# Patient Record
Sex: MALE | Race: WHITE | NOT HISPANIC OR LATINO | Employment: PART TIME | URBAN - METROPOLITAN AREA
[De-identification: names, ages, dates, MRNs, and addresses within clinical notes are randomized per-mention and may not be internally consistent; named-entity substitution may affect disease eponyms.]

---

## 2017-04-06 ENCOUNTER — ALLSCRIPTS OFFICE VISIT (OUTPATIENT)
Dept: OTHER | Facility: OTHER | Age: 37
End: 2017-04-06

## 2017-05-06 ENCOUNTER — HOSPITAL ENCOUNTER (EMERGENCY)
Facility: HOSPITAL | Age: 37
Discharge: HOME | End: 2017-05-06
Attending: EMERGENCY MEDICINE
Payer: COMMERCIAL

## 2017-05-06 VITALS
WEIGHT: 190 LBS | RESPIRATION RATE: 18 BRPM | OXYGEN SATURATION: 99 % | DIASTOLIC BLOOD PRESSURE: 81 MMHG | HEART RATE: 65 BPM | TEMPERATURE: 98.3 F | SYSTOLIC BLOOD PRESSURE: 130 MMHG

## 2017-05-06 DIAGNOSIS — M53.3 SACRAL BACK PAIN: Primary | ICD-10-CM

## 2017-05-06 PROCEDURE — 99283 EMERGENCY DEPT VISIT LOW MDM: CPT

## 2017-05-06 PROCEDURE — 96372 THER/PROPH/DIAG INJ SC/IM: CPT

## 2017-05-06 RX ORDER — KETOROLAC TROMETHAMINE 30 MG/ML
30 INJECTION, SOLUTION INTRAMUSCULAR; INTRAVENOUS ONCE
Status: COMPLETED | OUTPATIENT
Start: 2017-05-06 | End: 2017-05-06

## 2017-05-06 RX ORDER — CYCLOBENZAPRINE HCL 5 MG
TABLET ORAL
Qty: 12 TABLET | Refills: 0 | Status: SHIPPED | OUTPATIENT
Start: 2017-05-06 | End: 2018-02-27 | Stop reason: ALTCHOICE

## 2017-05-06 RX ORDER — IBUPROFEN 400 MG/1
400 TABLET ORAL AS NEEDED
COMMUNITY
End: 2020-01-06

## 2017-05-06 RX ORDER — LIDOCAINE 50 MG/G
1 PATCH TOPICAL ONCE
Status: DISCONTINUED | OUTPATIENT
Start: 2017-05-06 | End: 2017-05-06 | Stop reason: HOSPADM

## 2017-05-06 RX ADMIN — LIDOCAINE 1 PATCH: 50 PATCH CUTANEOUS at 20:31

## 2017-05-06 RX ADMIN — KETOROLAC TROMETHAMINE 30 MG: 30 INJECTION, SOLUTION INTRAMUSCULAR at 20:32

## 2017-05-15 ENCOUNTER — ALLSCRIPTS OFFICE VISIT (OUTPATIENT)
Dept: OTHER | Facility: OTHER | Age: 37
End: 2017-05-15

## 2017-05-17 ENCOUNTER — TRANSCRIBE ORDERS (OUTPATIENT)
Dept: ADMINISTRATIVE | Facility: HOSPITAL | Age: 37
End: 2017-05-17

## 2017-05-17 DIAGNOSIS — M54.40 ACUTE RIGHT-SIDED LOW BACK PAIN WITH SCIATICA, SCIATICA LATERALITY UNSPECIFIED: Primary | ICD-10-CM

## 2017-05-25 ENCOUNTER — GENERIC CONVERSION - ENCOUNTER (OUTPATIENT)
Dept: OTHER | Facility: OTHER | Age: 37
End: 2017-05-25

## 2017-05-25 ENCOUNTER — HOSPITAL ENCOUNTER (OUTPATIENT)
Dept: MRI IMAGING | Facility: HOSPITAL | Age: 37
Discharge: HOME | End: 2017-05-25
Attending: ANESTHESIOLOGY
Payer: COMMERCIAL

## 2017-05-25 DIAGNOSIS — M54.40 ACUTE RIGHT-SIDED LOW BACK PAIN WITH SCIATICA, SCIATICA LATERALITY UNSPECIFIED: ICD-10-CM

## 2017-05-25 PROCEDURE — 72148 MRI LUMBAR SPINE W/O DYE: CPT

## 2017-05-30 ENCOUNTER — GENERIC CONVERSION - ENCOUNTER (OUTPATIENT)
Dept: OTHER | Facility: OTHER | Age: 37
End: 2017-05-30

## 2017-08-30 ENCOUNTER — ALLSCRIPTS OFFICE VISIT (OUTPATIENT)
Dept: OTHER | Facility: OTHER | Age: 37
End: 2017-08-30

## 2017-09-12 ENCOUNTER — ALLSCRIPTS OFFICE VISIT (OUTPATIENT)
Dept: OTHER | Facility: OTHER | Age: 37
End: 2017-09-12

## 2017-11-17 ENCOUNTER — GENERIC CONVERSION - ENCOUNTER (OUTPATIENT)
Dept: OTHER | Facility: OTHER | Age: 37
End: 2017-11-17

## 2017-11-17 DIAGNOSIS — M54.50 LOW BACK PAIN: ICD-10-CM

## 2017-12-01 ENCOUNTER — APPOINTMENT (OUTPATIENT)
Dept: PHYSICAL THERAPY | Facility: REHABILITATION | Age: 37
End: 2017-12-01
Payer: COMMERCIAL

## 2017-12-01 DIAGNOSIS — M54.50 LOW BACK PAIN: ICD-10-CM

## 2017-12-01 PROCEDURE — G8991 OTHER PT/OT GOAL STATUS: HCPCS

## 2017-12-01 PROCEDURE — 97161 PT EVAL LOW COMPLEX 20 MIN: CPT

## 2017-12-01 PROCEDURE — 97112 NEUROMUSCULAR REEDUCATION: CPT

## 2017-12-01 PROCEDURE — G8990 OTHER PT/OT CURRENT STATUS: HCPCS

## 2017-12-05 ENCOUNTER — GENERIC CONVERSION - ENCOUNTER (OUTPATIENT)
Dept: PAIN MEDICINE | Facility: CLINIC | Age: 37
End: 2017-12-05

## 2017-12-06 ENCOUNTER — APPOINTMENT (OUTPATIENT)
Dept: PHYSICAL THERAPY | Facility: REHABILITATION | Age: 37
End: 2017-12-06
Payer: COMMERCIAL

## 2017-12-06 PROCEDURE — 97112 NEUROMUSCULAR REEDUCATION: CPT

## 2017-12-06 PROCEDURE — 97140 MANUAL THERAPY 1/> REGIONS: CPT

## 2017-12-14 ENCOUNTER — APPOINTMENT (OUTPATIENT)
Dept: PHYSICAL THERAPY | Facility: REHABILITATION | Age: 37
End: 2017-12-14
Payer: COMMERCIAL

## 2017-12-14 PROCEDURE — 97112 NEUROMUSCULAR REEDUCATION: CPT

## 2017-12-14 PROCEDURE — 97140 MANUAL THERAPY 1/> REGIONS: CPT

## 2017-12-21 ENCOUNTER — APPOINTMENT (OUTPATIENT)
Dept: PHYSICAL THERAPY | Facility: REHABILITATION | Age: 37
End: 2017-12-21
Payer: COMMERCIAL

## 2017-12-21 PROCEDURE — 97110 THERAPEUTIC EXERCISES: CPT

## 2017-12-21 PROCEDURE — 97112 NEUROMUSCULAR REEDUCATION: CPT

## 2017-12-21 PROCEDURE — 97140 MANUAL THERAPY 1/> REGIONS: CPT

## 2017-12-28 ENCOUNTER — APPOINTMENT (OUTPATIENT)
Dept: PHYSICAL THERAPY | Facility: REHABILITATION | Age: 37
End: 2017-12-28
Payer: COMMERCIAL

## 2018-01-04 ENCOUNTER — APPOINTMENT (OUTPATIENT)
Dept: PHYSICAL THERAPY | Facility: REHABILITATION | Age: 38
End: 2018-01-04
Payer: COMMERCIAL

## 2018-01-04 PROCEDURE — 97140 MANUAL THERAPY 1/> REGIONS: CPT

## 2018-01-10 ENCOUNTER — APPOINTMENT (OUTPATIENT)
Dept: PHYSICAL THERAPY | Facility: REHABILITATION | Age: 38
End: 2018-01-10
Payer: COMMERCIAL

## 2018-01-10 PROCEDURE — 97112 NEUROMUSCULAR REEDUCATION: CPT

## 2018-01-10 PROCEDURE — 97140 MANUAL THERAPY 1/> REGIONS: CPT

## 2018-01-10 NOTE — MISCELLANEOUS
Message   Recorded as Task   Date: 11/17/2017 09:11 AM, Created By: Julio Blount   Task Name: Miscellaneous   Assigned To: Yury Arauz   Regarding Patient: Roxana Berry, Status: Active   Comment:    Julio Blount - 17 Nov 2017 9:11 AM     TASK CREATED  phone call from patient stating he would like to try physical therapy to help with his back pain  patient requesting an order for physical therapy  please call patient at [phone]  Mercedez Shira - 17 Nov 2017 10:16 AM     TASK IN PROGRESS   Gaby Nava - 17 Nov 2017 10:22 AM     TASK EDITED  Last ov was consult with FQ 5/2017  S/w pt today who states he would like to try PT at this time  Pt has not tried PT before  C/o lower back pain that is "terrible" by the end of the week, with work and activity  Pain mostly in back but occasionally shoots down left leg to calf, heel and toe but states that is quick and does not last long  Staets taking ibuprofen for pain and resting when he can  Advised may need ov prior to ordering PT and will check with FQ  Please advise  Liss Borrego - 83 Nov 2017 11:19 AM     TASK REPLIED TO: Previously Assigned To Liss Borrego  PT ordered and printed   Landen Peña - 17 Nov 2017 11:43 AM     TASK EDITED  Left detailed vm for pt on number provided that Dr Jaylyn Márquez order PT for him and copy of the PT script will be mailed to him  Ph number for scheduling at a [facility] location will be highlighted on the script  Any questions, pt is to call the office  PT script mailed  Active Problems    1  Acute bilateral low back pain without sciatica (724 2,338 19) (M54 5)   2  Allergic rhinitis (477 9) (J30 9)   3  Anxiety (300 00) (F41 9)   4  Bronchitis (490) (J40)   5  Chronic midline low back pain without sciatica (724 2,338 29) (M54 5,G89 29)   6  Chronic prostatitis (601 1) (N41 1)   7  Depression (311) (F32 9)   8  External hemorrhoids (455 3) (K64 4)   9   Gastroesophageal reflux disease without esophagitis (530 81) (K21 9)   10  Hematuria (599 70) (R31 9)   11  Irritable bowel syndrome with diarrhea (564 1) (K58 0)   12  Pelvic pain (R10 2)   13  Pharyngitis (462) (J02 9)   14  Rectal pain (569 42) (K62 89)   15  Screening for lipoid disorders (V77 91) (Z13 220)   16  Total bilirubin, elevated (277 4) (R17)   17  WBC decreased (288 50) (D72 819)    Current Meds   1  Cetirizine HCl - 10 MG Oral Tablet; TAKE 1 TABLET DAILY; Therapy: [id number] to Recorded   2  Fluticasone Propionate 50 MCG/ACT Nasal Suspension; USE 1 TO 2 SPRAYS IN EACH   NOSTRIL ONCE DAILY; Therapy: [id number] to Recorded    Allergies    1   No Known Drug Allergies    Signatures   Electronically signed by : Jerrica Odell, ; Nov 17 2017 11:44AM EST                       (Author)

## 2018-01-11 NOTE — RESULT NOTES
Message   please let him know recent urine culture was negative, no bacteria   thanks     Verified Results  (1) URINALYSIS (will reflex a microscopy if leukocytes, occult blood, protein or nitrites are not within normal limits) 29Jun2016 09:04PM Rebecca Tran    Order Number: [id number]  TW Order Number: [id number]  TW Order Number: [id number] Order Number: [id number]     Test Name Result Flag Reference   COLOR Yellow     CLARITY Turbid     SPECIFIC GRAVITY UA 1 026  1 003-1 030   PH UA 5 5  4 5-8 0   LEUKOCYTE ESTERASE UA Negative  Negative   NITRITE UA Negative  Negative   PROTEIN UA Negative mg/dl  Negative   GLUCOSE UA Negative mg/dl  Negative   KETONES UA Negative mg/dl  Negative   UROBILINOGEN UA 0 2 E U /dl  0 2, 1 0 E U /dl   BILIRUBIN UA Negative  Negative   BLOOD UA Small A Negative   BACTERIA None Seen /hpf  None Seen, Occasional   EPITHELIAL CELLS None Seen /hpf  None Seen, Occasional   RBC UA None Seen /hpf  None Seen   WBC UA None Seen /hpf  None Seen     (1) URINE CULTURE 29Jun2016 09:04PM Rebecca Tran     Test Name Result Flag Reference   CLINICAL REPORT (Report)     Test:        Urine culture  Specimen Type:   Urine  Specimen Date:   6/29/2016 9:04 PM  Result Date:    7/1/2016 11:19 AM  Result Status:   Final result  Resulting Lab:   [address] [address]            Tel: [phone]      CULTURE                                       ------------------                                   No Growth <1000 cfu/mL

## 2018-01-12 VITALS
DIASTOLIC BLOOD PRESSURE: 70 MMHG | HEIGHT: 73 IN | TEMPERATURE: 99.1 F | RESPIRATION RATE: 16 BRPM | WEIGHT: 196.38 LBS | SYSTOLIC BLOOD PRESSURE: 120 MMHG | HEART RATE: 80 BPM | BODY MASS INDEX: 26.03 KG/M2

## 2018-01-12 NOTE — MISCELLANEOUS
Message   Recorded as Task   Date: 05/25/2017 12:15 PM, Created By: Janette Menendez   Task Name: Call Back   Assigned To: [facility] rupa clinical,Team   Regarding Patient: Candace Landaverde, Status: Active   Comment:    Janette Menendez - 25 May 2017 12:15 PM     TASK CREATED  [facility]- Patient called requesting to s/w a nurse  Stated he has some questions in regards to him getting an MRI  Please give patient a c/b [phone]   Ivory Sim - 25 May 2017 12:58 PM     TASK IN PROGRESS   Ivory Sim - 25 May 2017 1:00 PM     TASK EDITED  Spoke to pt, he wanted to confirm that the MRI of the lumbar spine will cover the sacrum area as well  Informed pt that yes the MRI will evaluate that area also  Pt appreciative and will proceed w/MRI scheduled for luis alfredo Prado - 25 May 2017 2:05 PM     TASK REPLIED TO: Previously Assigned To Isaiah bernard md aware        Active Problems    1  Acute bilateral low back pain without sciatica (724 2,338 19) (M54 5)   2  Allergic rhinitis (477 9) (J30 9)   3  Anxiety (300 00) (F41 9)   4  Change in bowel habits (787 99) (R19 4)   5  Chronic midline low back pain without sciatica (724 2,338 29) (M54 5,G89 29)   6  Chronic prostatitis (601 1) (N41 1)   7  Depression (311) (F32 9)   8  External hemorrhoids (455 3) (K64 4)   9  Gastroesophageal reflux disease without esophagitis (530 81) (K21 9)   10  Globus sensation (306 4) (F45 8)   11  Hematuria (599 70) (R31 9)   12  Irritable bowel syndrome with diarrhea (564 1) (K58 0)   13  Lesion of left external ear (380 9) (H61 92)   14  Lightheadedness (780 4) (R42)   15  Other fatigue (780 79) (R53 83)   16  Pelvic pain (R10 2)   17  Rectal pain (569 42) (K62 89)   18  Screening for lipoid disorders (V77 91) (Z13 220)   19  Total bilirubin, elevated (277 4) (R17)   20  Viral gastroenteritis (008 8) (A08 4)   21  WBC decreased (288 50) (D72 819)    Current Meds   1  Probiotic Oral Capsule;    Therapy: [id number] to Recorded    Allergies    1   No Known Drug Allergies    Signatures   Electronically signed by : Ford Marcus RN; May 25 2017  2:19PM EST                       (Author)

## 2018-01-14 VITALS
DIASTOLIC BLOOD PRESSURE: 82 MMHG | BODY MASS INDEX: 25.12 KG/M2 | SYSTOLIC BLOOD PRESSURE: 130 MMHG | TEMPERATURE: 97.5 F | WEIGHT: 189.5 LBS | HEIGHT: 73 IN | HEART RATE: 76 BPM

## 2018-01-15 VITALS
RESPIRATION RATE: 14 BRPM | SYSTOLIC BLOOD PRESSURE: 126 MMHG | BODY MASS INDEX: 24.78 KG/M2 | HEIGHT: 73 IN | DIASTOLIC BLOOD PRESSURE: 80 MMHG | HEART RATE: 88 BPM | WEIGHT: 187 LBS

## 2018-01-16 NOTE — MISCELLANEOUS
Message   Recorded as Task   Date: 05/30/2017 11:12 AM, Created By: Marjan Ashton   Task Name: Follow Up   Assigned To: Anrdea Baez   Regarding Patient: Ineljohnny Contreras, Status: Active   Comment:    Marjan Ashton - 30 May 2017 11:12 AM     TASK CREATED  left VM to go over MRI results   Ivory Sim - 30 May 2017 11:24 AM     TASK IN PROGRESS   Mary Rubi - 30 May 2017 1:55 PM     TASK EDITED  Pt returned call and can be reached from now to the rest of the day at [phone]  Bianca Funk - 30 May 2017 2:01 PM     TASK REASSIGNED: Previously Assigned To KVNG Guerrero - 30 May 2017 3:54 PM     TASK REPLIED TO: Previously Assigned To Marjan Ashton  spoke to pt and advised DDD at L5-S1  Katymau Stevens pt will f/u PRN        Active Problems    1  Acute bilateral low back pain without sciatica (724 2,338 19) (M54 5)   2  Allergic rhinitis (477 9) (J30 9)   3  Anxiety (300 00) (F41 9)   4  Change in bowel habits (787 99) (R19 4)   5  Chronic midline low back pain without sciatica (724 2,338 29) (M54 5,G89 29)   6  Chronic prostatitis (601 1) (N41 1)   7  Depression (311) (F32 9)   8  External hemorrhoids (455 3) (K64 4)   9  Gastroesophageal reflux disease without esophagitis (530 81) (K21 9)   10  Globus sensation (306 4) (F45 8)   11  Hematuria (599 70) (R31 9)   12  Irritable bowel syndrome with diarrhea (564 1) (K58 0)   13  Lesion of left external ear (380 9) (H61 92)   14  Lightheadedness (780 4) (R42)   15  Other fatigue (780 79) (R53 83)   16  Pelvic pain (R10 2)   17  Rectal pain (569 42) (K62 89)   18  Screening for lipoid disorders (V77 91) (Z13 220)   19  Total bilirubin, elevated (277 4) (R17)   20  Viral gastroenteritis (008 8) (A08 4)   21  WBC decreased (288 50) (D72 819)    Current Meds   1  Probiotic Oral Capsule; Therapy: [id number] to Recorded    Allergies    1   No Known Drug Allergies    Signatures   Electronically signed by : Sandra Sarah, ; May 30 2017  3:57PM EST                       (Author)

## 2018-01-17 NOTE — RESULT NOTES
Message   Continue present patient know that his blood work was overall stable, his total cholesterol was 197, his good chol was 44  I have encouraged increasing good fats in the diet including olive oil, avocados, nuts     Thank you     Verified Results  (1) CBC/PLT/DIFF [id number] 09:03AM Mónica Alvarez     Test Name Result Flag Reference   WBC COUNT 4 31 Thousand/uL  4 31-10 16   RBC COUNT 5 32 Million/uL  3 88-5 62   HEMOGLOBIN 15 5 g/dL  12 0-17 0   HEMATOCRIT 46 1 %  36 5-49 3   MCV 87 fL  82-98   MCH 29 1 pg  26 8-34 3   MCHC 33 6 g/dL  31 4-37 4   RDW 12 4 %  11 6-15 1   MPV 9 8 fL  8 9-12 7   PLATELET COUNT 114 Thousands/uL  149-390   nRBC AUTOMATED 0 /100 WBCs     NEUTROPHILS RELATIVE PERCENT 51 %  43-75   LYMPHOCYTES RELATIVE PERCENT 34 %  14-44   MONOCYTES RELATIVE PERCENT 10 %  4-12   EOSINOPHILS RELATIVE PERCENT 4 %  0-6   BASOPHILS RELATIVE PERCENT 1 %  0-1   NEUTROPHILS ABSOLUTE COUNT 2 28 Thousands/?L  1 85-7 62   LYMPHOCYTES ABSOLUTE COUNT 1 45 Thousands/?L  0 60-4 47   MONOCYTES ABSOLUTE COUNT 0 41 Thousand/?L  0 17-1 22   EOSINOPHILS ABSOLUTE COUNT 0 15 Thousand/?L  0 00-0 61   BASOPHILS ABSOLUTE COUNT 0 02 Thousands/?L  0 00-0 10     (1) LIPID PANEL FASTING W DIRECT LDL REFLEX [id number] 09:03AM Mónica Alvarez     Test Name Result Flag Reference   CHOLESTEROL 197 mg/dL     LDL CHOLESTEROL CALCULATED 143 mg/dL H 0-100   Triglyceride:         Normal              <150 mg/dl       Borderline High    150-199 mg/dl       High               200-499 mg/dl       Very High          >499 mg/dl  Cholesterol:         Desirable        <200 mg/dl      Borderline High  200-239 mg/dl      High             >239 mg/dl  HDL Cholesterol:        High    >59 mg/dL      Low     <41 mg/dL  LDL Cholesterol:        Optimal          <100 mg/dl        Near Optimal     100-129 mg/dl        Above Optimal          Borderline High   130-159 mg/dl          High              160-189 mg/dl          Very High        >189 mg/dl  LDL CALCULATED:    This screening LDL is a calculated result  It does not have the accuracy of the Direct Measured LDL in the monitoring of patients with hyperlipidemia and/or statin therapy  Direct Measure LDL (AER315) must be ordered separately in these patients  TRIGLYCERIDES 48 mg/dL  <=150   Specimen collection should occur prior to N-Acetylcysteine or Metamizole administration due to the potential for falsely depressed results  HDL,DIRECT 44 mg/dL  40-60   Specimen collection should occur prior to Metamizole administration due to the potential for falsely depressed results       (1) BILIRUBIN, TOTAL [id number] 09:03AM Mónica Alvarez     Test Name Result Flag Reference   BILI, TOTAL 1 35 mg/dL H 0 20-1 00

## 2018-01-17 NOTE — MISCELLANEOUS
Message  Return to work or school:   Yvan Johnson is under my professional care  He was seen in my office on 9/12/2017   He is able to return to work on  9/13/2017      Please excuse from work 9/12/2017          Signatures   Electronically signed by : FAB To; Sep 12 2017  1:32PM EST                       (Author)

## 2018-01-18 ENCOUNTER — APPOINTMENT (OUTPATIENT)
Dept: PHYSICAL THERAPY | Facility: REHABILITATION | Age: 38
End: 2018-01-18
Payer: COMMERCIAL

## 2018-01-22 VITALS
RESPIRATION RATE: 16 BRPM | BODY MASS INDEX: 26.11 KG/M2 | HEIGHT: 73 IN | DIASTOLIC BLOOD PRESSURE: 62 MMHG | WEIGHT: 197 LBS | HEART RATE: 64 BPM | TEMPERATURE: 98.9 F | SYSTOLIC BLOOD PRESSURE: 108 MMHG

## 2018-01-25 ENCOUNTER — APPOINTMENT (OUTPATIENT)
Dept: PHYSICAL THERAPY | Facility: REHABILITATION | Age: 38
End: 2018-01-25
Payer: COMMERCIAL

## 2018-01-31 ENCOUNTER — OFFICE VISIT (OUTPATIENT)
Dept: PHYSICAL THERAPY | Facility: REHABILITATION | Age: 38
End: 2018-01-31
Payer: COMMERCIAL

## 2018-01-31 DIAGNOSIS — G89.29 CHRONIC LEFT-SIDED LOW BACK PAIN WITHOUT SCIATICA: Primary | ICD-10-CM

## 2018-01-31 DIAGNOSIS — M54.50 CHRONIC LEFT-SIDED LOW BACK PAIN WITHOUT SCIATICA: Primary | ICD-10-CM

## 2018-01-31 PROCEDURE — 97140 MANUAL THERAPY 1/> REGIONS: CPT | Performed by: PHYSICAL THERAPIST

## 2018-01-31 PROCEDURE — 97112 NEUROMUSCULAR REEDUCATION: CPT | Performed by: PHYSICAL THERAPIST

## 2018-01-31 NOTE — PROGRESS NOTES
Daily Note     Today's date: 2018  Patient name: Dalia Waite  : 1980  MRN: [id number]  Referring provider: Othella Sever, MD  Dx:   Encounter Diagnosis   Name Primary?  Chronic left-sided low back pain without sciatica Yes       Start Time: 1740  Stop Time: 1830  Total time in clinic (min): 50 minutes    Subjective: Patient reports that he has been feeling well the last few days (3)  Reports complaince with hep, evident upon good recall of TE      Objective: See treatment diary below      Assessment: Tolerated treatment well  Patient with improved core control evident upon ability to perfrom seated hip flexion without LS compensatory rotation  + ely and 90/90 HS testing, added mobility to quad and HS to HEP  Good overall LS AROM, pain free with tightness felt at end range flexion  Plan: Continue per plan of care       Precautions: none    Daily Treatment Diary     Manual              Assessment time/education 20'                                                                    Exercise Diary              Seated HS stretch 20"x4            [place]: forward/backward lean 2x8 each            Supine TA + march 15 B/L                                                                                                                                                                                                                                             Modalities

## 2018-02-08 ENCOUNTER — OFFICE VISIT (OUTPATIENT)
Dept: PHYSICAL THERAPY | Facility: REHABILITATION | Age: 38
End: 2018-02-08
Payer: COMMERCIAL

## 2018-02-08 DIAGNOSIS — G89.29 CHRONIC LEFT-SIDED LOW BACK PAIN WITHOUT SCIATICA: Primary | ICD-10-CM

## 2018-02-08 DIAGNOSIS — M54.50 CHRONIC LEFT-SIDED LOW BACK PAIN WITHOUT SCIATICA: Primary | ICD-10-CM

## 2018-02-08 PROCEDURE — 97140 MANUAL THERAPY 1/> REGIONS: CPT | Performed by: PHYSICAL THERAPIST

## 2018-02-08 PROCEDURE — 97112 NEUROMUSCULAR REEDUCATION: CPT | Performed by: PHYSICAL THERAPIST

## 2018-02-08 NOTE — PROGRESS NOTES
Daily Note     Today's date: 2018  Patient name: John Ashton  : 1980  MRN: [id number]  Referring provider: Mago Vargas MD  Dx:   Encounter Diagnosis   Name Primary?  Chronic left-sided low back pain without sciatica Yes       Start Time:   Stop Time:   Total time in clinic (min): 45 minutes    Subjective: Patient reports that his pain has been last for short duration when he does experience it  Objective: See treatment diary below      Assessment: Tolerated treatment well  Patient with poor hip extension evident upon compensatory LS ext with hip extension passively  Added q-ped kick backs and martin stretch to HEP      Plan: Continue per plan of care        Precautions: none    Daily Treatment Diary     Manual             Assessment time/education 20'            SL hip flexor stretch  B/L-3x                                                       Exercise Diary             Seated HS stretch 20"x4            [place]: forward/backward lean 2x8 each            Supine TA + march 15x B/L            Bike  5'           Qped arm lifts  20x           Qped multif  20x           Qped hip ext  10x                                                                                                                                                                                         Modalities

## 2018-02-16 RX ORDER — ALBUTEROL SULFATE 90 UG/1
AEROSOL, METERED RESPIRATORY (INHALATION)
COMMUNITY
Start: 2014-08-24 | End: 2018-02-27 | Stop reason: ALTCHOICE

## 2018-02-16 RX ORDER — NAPROXEN SODIUM 220 MG
220 TABLET ORAL AS NEEDED
COMMUNITY
End: 2020-01-06

## 2018-02-16 RX ORDER — FEXOFENADINE HCL 180 MG/1
180 TABLET ORAL
COMMUNITY
End: 2018-02-27 | Stop reason: ALTCHOICE

## 2018-02-16 RX ORDER — FLUTICASONE PROPIONATE 50 MCG
1-2 SPRAY, SUSPENSION (ML) NASAL AS NEEDED
COMMUNITY
Start: 2017-09-12 | End: 2020-01-06

## 2018-02-16 RX ORDER — PREDNISONE 50 MG/1
50 TABLET ORAL
COMMUNITY
Start: 2014-08-24 | End: 2018-02-27 | Stop reason: ALTCHOICE

## 2018-02-16 RX ORDER — BACITRACIN, NEOMYCIN, POLYMYXIN B 400; 3.5; 5 [USP'U]/G; MG/G; [USP'U]/G
OINTMENT TOPICAL
COMMUNITY
End: 2018-02-27 | Stop reason: ALTCHOICE

## 2018-02-16 RX ORDER — CETIRIZINE HYDROCHLORIDE 10 MG/1
1 TABLET ORAL AS NEEDED
COMMUNITY
Start: 2017-09-12

## 2018-02-17 ENCOUNTER — OFFICE VISIT (OUTPATIENT)
Dept: FAMILY MEDICINE CLINIC | Facility: CLINIC | Age: 38
End: 2018-02-17
Payer: COMMERCIAL

## 2018-02-17 VITALS
HEIGHT: 73 IN | TEMPERATURE: 97.1 F | SYSTOLIC BLOOD PRESSURE: 122 MMHG | WEIGHT: 201.2 LBS | DIASTOLIC BLOOD PRESSURE: 88 MMHG | RESPIRATION RATE: 16 BRPM | HEART RATE: 60 BPM | BODY MASS INDEX: 26.66 KG/M2

## 2018-02-17 DIAGNOSIS — H61.22 IMPACTED CERUMEN OF LEFT EAR: Primary | ICD-10-CM

## 2018-02-17 PROCEDURE — 99212 OFFICE O/P EST SF 10 MIN: CPT | Performed by: FAMILY MEDICINE

## 2018-02-17 PROCEDURE — 69209 REMOVE IMPACTED EAR WAX UNI: CPT | Performed by: FAMILY MEDICINE

## 2018-02-17 NOTE — PROGRESS NOTES
FAMILY PRACTICE  OFFICE VISIT       NAME: Vy Gomez  AGE: 41 y o  SEX: male       : 1980        MRN: [id number]    DATE: 2018  TIME: 10:11 AM    Assessment and Plan     Problem List Items Addressed This Visit     Impacted cerumen of left ear - Primary          Patient will call if symptoms recur  Patient felt much better after having wax removal   There are no Patient Instructions on file for this visit  Chief Complaint     Chief Complaint   Patient presents with    Earache     Pt is here w/ c/o left ear pain times 2 weeks mainly when he moves his jaw        History of Present Illness     Patient states he has had several weeks history of left ear fullness and discomfort  He denies any recent illness  Earache          Review of Systems   Review of Systems   Constitutional: Negative  HENT: Positive for ear pain  As per HPI   Respiratory: Negative  Cardiovascular: Negative  Gastrointestinal: Negative  Active Problem List     Patient Active Problem List   Diagnosis    Impacted cerumen of left ear       Past Medical History:  No past medical history on file  Past Surgical History:  No past surgical history on file  Family History:  No family history on file  Social History:  Social History     Social History    Marital status: /Civil Union     Spouse name: N/A    Number of children: N/A    Years of education: N/A     Occupational History    Not on file       Social History Main Topics    Smoking status: Never Smoker    Smokeless tobacco: Never Used    Alcohol use No    Drug use: No    Sexual activity: Not on file     Other Topics Concern    Not on file     Social History Narrative    No narrative on file       Objective     Vitals:    18 0939   BP: 122/88   Pulse: 60   Resp: 16   Temp: (!) 97 1 °F (36 2 °C)     Wt Readings from Last 3 Encounters:   18 91 3 kg (201 lb 3 2 oz)   17 89 4 kg (197 lb)   17 89 1 kg (196 lb 6 1 oz)       Physical Exam   HENT:   Left ear canal was impacted with wax  Right tympanic membrane was within normal limits  Cardiovascular:   Regular rate and rhythm with no murmurs   Pulmonary/Chest:   Lungs are clear to auscultation without wheezes,rales, or rhonchi   Lymphadenopathy:     He has no cervical adenopathy        Ear cerumen removal  Date/Time: 2/17/2018 10:09 AM  Performed by: Cammie Wilkinson by: Camden Toribio     Consent:     Consent obtained:  Verbal    Consent given by:  Patient  Universal protocol:     Procedure explained and questions answered to patient or proxy's satisfaction: yes    Procedure details:     Location:  L ear    Procedure type: irrigation      Approach:  Natural orifice    Equipment used:  Curette  Post-procedure details:     Complication:  None    Hearing quality:  Normal  Comments:      Using irrigation with peroxide and water mixture and using a curette I was able to remove a large amount of cerumen from left ear canal   Tympanic membrane wasWithin normal limits        Pertinent Laboratory/Diagnostic Studies:  Lab Results   Component Value Date    GLUCOSE 87 04/25/2016    BUN 13 04/25/2016    CREATININE 0 90 04/25/2016    CALCIUM 9 1 04/25/2016     04/25/2016    K 3 9 04/25/2016    CO2 31 04/25/2016     04/25/2016     Lab Results   Component Value Date    BILITOT 1 35 (H) 11/11/2016       Lab Results   Component Value Date    WBC 4 31 11/11/2016    HGB 15 5 11/11/2016    HCT 46 1 11/11/2016    MCV 87 11/11/2016     11/11/2016       No results found for: TSH    Lab Results   Component Value Date    CHOL 197 11/11/2016     Lab Results   Component Value Date    TRIG 48 11/11/2016     Lab Results   Component Value Date    HDL 44 11/11/2016     Lab Results   Component Value Date    LDLCALC 143 (H) 11/11/2016     No results found for: HGBA1C    Results for orders placed or performed in visit on 11/11/16   CBC and differential   Result Value Ref Range    WBC 4 31 4 31 - 10 16 Thousand/uL    RBC 5 32 3 88 - 5 62 Million/uL    Hemoglobin 15 5 12 0 - 17 0 g/dL    Hematocrit 46 1 36 5 - 49 3 %    MCV 87 82 - 98 fL    MCH 29 1 26 8 - 34 3 pg    MCHC 33 6 31 4 - 37 4 g/dL    RDW 12 4 11 6 - 15 1 %    MPV 9 8 8 9 - 12 7 fL    Platelets 424 170 - 869 Thousands/uL    nRBC 0 /100 WBCs    Neutrophils Relative 51 43 - 75 %    Lymphocytes Relative 34 14 - 44 %    Monocytes Relative 10 4 - 12 %    Eosinophils Relative 4 0 - 6 %    Basophils Relative 1 0 - 1 %    Neutrophils Absolute 2 28 1 85 - 7 62 Thousands/µL    Lymphocytes Absolute 1 45 0 60 - 4 47 Thousands/µL    Monocytes Absolute 0 41 0 17 - 1 22 Thousand/µL    Eosinophils Absolute 0 15 0 00 - 0 61 Thousand/µL    Basophils Absolute 0 02 0 00 - 0 10 Thousands/µL   Bilirubin, total   Result Value Ref Range    Total Bilirubin 1 35 (H) 0 20 - 1 00 mg/dL   Lipid Panel with Direct LDL reflex   Result Value Ref Range    Cholesterol 197 50 - 200 mg/dL    Triglycerides 48 <=150 mg/dL    HDL, Direct 44 40 - 60 mg/dL    LDL Calculated 143 (H) 0 - 100 mg/dL       Orders Placed This Encounter   Procedures    Ear cerumen removal       ALLERGIES:  No Known Allergies    Current Medications     Current Outpatient Prescriptions   Medication Sig Dispense Refill    cetirizine (ZyrTEC) 10 mg tablet Take 1 tablet by mouth as needed        fluticasone (FLONASE) 50 mcg/act nasal spray 1-2 sprays into each nostril as needed        ibuprofen (MOTRIN) 400 mg tablet Take 400 mg by mouth as needed for mild pain        naproxen sodium (ALEVE) 220 MG tablet Take 220 mg by mouth as needed        albuterol (PROVENTIL HFA,VENTOLIN HFA) 90 mcg/act inhaler 2 puffs QID prn cough/ wheeze        cyclobenzaprine (FLEXERIL) 5 mg tablet 1-2 PO TID PRN 12 tablet 0    fexofenadine (ALLEGRA) 180 MG tablet Take 180 mg by mouth      neomycin-bacitracin-polymyxin b (NEOSPORIN) ointment Apply topically      predniSONE 50 mg tablet 50 mg       No current facility-administered medications for this visit  Health Maintenance     Health Maintenance   Topic Date Due    HIV SCREENING  1980    PT PLAN OF CARE  1980    DTaP,Tdap,and Td Vaccines (1 - Tdap) 02/17/1988    Depression Screening PHQ-9  02/17/1993    INFLUENZA VACCINE  09/01/2017       There is no immunization history on file for this patient      Duke Pack MD

## 2018-02-19 ENCOUNTER — OFFICE VISIT (OUTPATIENT)
Dept: PHYSICAL THERAPY | Facility: REHABILITATION | Age: 38
End: 2018-02-19
Payer: COMMERCIAL

## 2018-02-19 DIAGNOSIS — M54.50 CHRONIC LEFT-SIDED LOW BACK PAIN WITHOUT SCIATICA: Primary | ICD-10-CM

## 2018-02-19 DIAGNOSIS — G89.29 CHRONIC LEFT-SIDED LOW BACK PAIN WITHOUT SCIATICA: Primary | ICD-10-CM

## 2018-02-19 PROCEDURE — 97112 NEUROMUSCULAR REEDUCATION: CPT | Performed by: PHYSICAL THERAPIST

## 2018-02-19 PROCEDURE — 97140 MANUAL THERAPY 1/> REGIONS: CPT | Performed by: PHYSICAL THERAPIST

## 2018-02-19 NOTE — PROGRESS NOTES
Daily Note     Today's date: 2018  Patient name: Samantha Jarvis  : 1980  MRN: [id number]  Referring provider: Jamari Mccollum MD  Dx:   Encounter Diagnosis     ICD-10-CM    1  Chronic left-sided low back pain without sciatica M54 5     G89 29        Start Time: 1700  Stop Time: 1745  Total time in clinic (min): 45 minutes    Subjective: Patient continues to report no changes in LBP at this time  Change overall since IE but LBP still remains      Objective: See treatment diary below      Assessment: Tolerated treatment fair  Patient demonstrates lower LS (L3-5) hypomobility with segmental testing, hinge point above with all AROM, pull felt L1-2,L2-3 with end range flexion, unchanged with LS self SNAGS  Plan: Continue per plan of care    Patient to see PT Lillian Ng in 1 month for re-assessment    Precautions: none    Daily Treatment Diary     Manual            Assessment time/education 20'            SL hip flexor stretch  B/L-3x            Assessment and education   20'          Psoas STM   5'                           Exercise Diary            Seated HS stretch 20"x4            [place]: forward/backward lean 2x8 each            Supine TA + march 15x B/L            Bike  5'           Qped arm lifts  20x           Qped multif  20x           Qped hip ext  10x            LS segmental mobility at wall   12x          LS SNAG   2x6                                                                                                                                                             Modalities

## 2018-02-27 ENCOUNTER — OFFICE VISIT (OUTPATIENT)
Dept: FAMILY MEDICINE CLINIC | Facility: CLINIC | Age: 38
End: 2018-02-27
Payer: COMMERCIAL

## 2018-02-27 VITALS
RESPIRATION RATE: 16 BRPM | HEART RATE: 54 BPM | TEMPERATURE: 97.1 F | BODY MASS INDEX: 26.4 KG/M2 | HEIGHT: 73 IN | WEIGHT: 199.2 LBS | SYSTOLIC BLOOD PRESSURE: 112 MMHG | DIASTOLIC BLOOD PRESSURE: 78 MMHG

## 2018-02-27 DIAGNOSIS — H69.82 DYSFUNCTION OF LEFT EUSTACHIAN TUBE: Primary | ICD-10-CM

## 2018-02-27 PROBLEM — H69.92 DYSFUNCTION OF LEFT EUSTACHIAN TUBE: Status: ACTIVE | Noted: 2018-02-27

## 2018-02-27 PROCEDURE — 99213 OFFICE O/P EST LOW 20 MIN: CPT | Performed by: FAMILY MEDICINE

## 2018-02-27 RX ORDER — PREDNISONE 20 MG/1
TABLET ORAL
Qty: 7 TABLET | Refills: 0 | Status: SHIPPED | OUTPATIENT
Start: 2018-02-27 | End: 2018-05-03 | Stop reason: ALTCHOICE

## 2018-02-28 NOTE — PROGRESS NOTES
FAMILY PRACTICE OFFICE VISIT       NAME: Trina Renteria  AGE: 41 y o  SEX: male       : 1980        MRN: [id number]    DATE: 2018  TIME: 7:08 PM    Assessment and Plan     Problem List Items Addressed This Visit     Dysfunction of left eustachian tube - Primary    Relevant Medications    predniSONE 20 mg tablet        Patient appears to have eustachian tube dysfunction of his left ear  He was given a prednisone tapering dose to use as directed for 6 days  He was also given referral to [facility] if symptoms persist after medication completed    There are no Patient Instructions on file for this visit  Chief Complaint     Chief Complaint   Patient presents with    Earache     Patient is here c/o a left earache x's 1+ wks  History of Present Illness     Patient states he is still experiencing intermittent left ear pain on a daily basis  He does wear earplugs at work however this is not a change for him  He denies any ear discharge  He does use Flonase on a daily basis for allergies and sinus issues  He denies any changes in hearing      Earache    Pertinent negatives include no ear discharge or sore throat  Review of Systems   Review of Systems   Constitutional: Negative  HENT: Positive for ear pain and sinus pressure  Negative for ear discharge, sore throat and tinnitus  Eyes: Negative  Respiratory: Negative  Cardiovascular: Negative          Active Problem List     Patient Active Problem List   Diagnosis    Impacted cerumen of left ear    Acute bilateral low back pain without sciatica    Mixed emotional features as adjustment reaction    Allergic rhinitis due to pollen    Anxiety    Chronic prostatitis    Gastroesophageal reflux disease without esophagitis    Hyperlipidemia    Irritable bowel syndrome with diarrhea    Total bilirubin, elevated    Dysfunction of left eustachian tube       Past Medical History:  No past medical history on file     Past Surgical History:  No past surgical history on file  Family History:  No family history on file  Social History:  Social History     Social History    Marital status: /Civil Union     Spouse name: N/A    Number of children: N/A    Years of education: N/A     Occupational History    Not on file  Social History Main Topics    Smoking status: Never Smoker    Smokeless tobacco: Never Used    Alcohol use No    Drug use: No    Sexual activity: Not on file     Other Topics Concern    Not on file     Social History Narrative    No narrative on file       Objective     Vitals:    02/27/18 1734   BP: 112/78   Pulse: (!) 54   Resp: 16   Temp: (!) 97 1 °F (36 2 °C)     Wt Readings from Last 3 Encounters:   02/27/18 90 4 kg (199 lb 3 2 oz)   02/17/18 91 3 kg (201 lb 3 2 oz)   09/12/17 89 4 kg (197 lb)       Physical Exam   HENT:   Mouth/Throat: Oropharyngeal exudate present  Tympanic membranes within normal limits bilaterally  No evidence of any wax impaction at this time  Nasal mucosa appears normal   Lymphadenopathy:     He has cervical adenopathy         Pertinent Laboratory/Diagnostic Studies:  Lab Results   Component Value Date    GLUCOSE 87 04/25/2016    BUN 13 04/25/2016    CREATININE 0 90 04/25/2016    CALCIUM 9 1 04/25/2016     04/25/2016    K 3 9 04/25/2016    CO2 31 04/25/2016     04/25/2016     Lab Results   Component Value Date    BILITOT 1 35 (H) 11/11/2016       Lab Results   Component Value Date    WBC 4 31 11/11/2016    HGB 15 5 11/11/2016    HCT 46 1 11/11/2016    MCV 87 11/11/2016     11/11/2016       No results found for: TSH    Lab Results   Component Value Date    CHOL 197 11/11/2016     Lab Results   Component Value Date    TRIG 48 11/11/2016     Lab Results   Component Value Date    HDL 44 11/11/2016     Lab Results   Component Value Date    LDLCALC 143 (H) 11/11/2016     No results found for: HGBA1C    Results for orders placed or performed in visit on 11/11/16   CBC and differential   Result Value Ref Range    WBC 4 31 4 31 - 10 16 Thousand/uL    RBC 5 32 3 88 - 5 62 Million/uL    Hemoglobin 15 5 12 0 - 17 0 g/dL    Hematocrit 46 1 36 5 - 49 3 %    MCV 87 82 - 98 fL    MCH 29 1 26 8 - 34 3 pg    MCHC 33 6 31 4 - 37 4 g/dL    RDW 12 4 11 6 - 15 1 %    MPV 9 8 8 9 - 12 7 fL    Platelets 133 232 - 039 Thousands/uL    nRBC 0 /100 WBCs    Neutrophils Relative 51 43 - 75 %    Lymphocytes Relative 34 14 - 44 %    Monocytes Relative 10 4 - 12 %    Eosinophils Relative 4 0 - 6 %    Basophils Relative 1 0 - 1 %    Neutrophils Absolute 2 28 1 85 - 7 62 Thousands/µL    Lymphocytes Absolute 1 45 0 60 - 4 47 Thousands/µL    Monocytes Absolute 0 41 0 17 - 1 22 Thousand/µL    Eosinophils Absolute 0 15 0 00 - 0 61 Thousand/µL    Basophils Absolute 0 02 0 00 - 0 10 Thousands/µL   Bilirubin, total   Result Value Ref Range    Total Bilirubin 1 35 (H) 0 20 - 1 00 mg/dL   Lipid Panel with Direct LDL reflex   Result Value Ref Range    Cholesterol 197 50 - 200 mg/dL    Triglycerides 48 <=150 mg/dL    HDL, Direct 44 40 - 60 mg/dL    LDL Calculated 143 (H) 0 - 100 mg/dL       No orders of the defined types were placed in this encounter  ALLERGIES:  No Known Allergies    Current Medications     Current Outpatient Prescriptions   Medication Sig Dispense Refill    cetirizine (ZyrTEC) 10 mg tablet Take 1 tablet by mouth as needed        fluticasone (FLONASE) 50 mcg/act nasal spray 1-2 sprays into each nostril as needed        ibuprofen (MOTRIN) 400 mg tablet Take 400 mg by mouth as needed for mild pain        naproxen sodium (ALEVE) 220 MG tablet Take 220 mg by mouth as needed        predniSONE 20 mg tablet 2 tabs x 2 days, 1 tab x 2 days, 1/2 tab x 2 days 7 tablet 0     No current facility-administered medications for this visit            Health Maintenance     Health Maintenance   Topic Date Due    HIV SCREENING  1980    PT PLAN OF CARE  1981    DTaP,Tdap,and Td Vaccines (1 - Tdap) 02/17/1988    Depression Screening PHQ-9  02/17/1993    INFLUENZA VACCINE  09/01/2017       There is no immunization history on file for this patient      Kareen Smis MD

## 2018-03-22 ENCOUNTER — EVALUATION (OUTPATIENT)
Dept: PHYSICAL THERAPY | Facility: REHABILITATION | Age: 38
End: 2018-03-22
Payer: COMMERCIAL

## 2018-03-22 ENCOUNTER — APPOINTMENT (OUTPATIENT)
Dept: PHYSICAL THERAPY | Facility: REHABILITATION | Age: 38
End: 2018-03-22
Payer: COMMERCIAL

## 2018-03-22 DIAGNOSIS — M54.50 CHRONIC LEFT-SIDED LOW BACK PAIN WITHOUT SCIATICA: Primary | ICD-10-CM

## 2018-03-22 DIAGNOSIS — G89.29 CHRONIC LEFT-SIDED LOW BACK PAIN WITHOUT SCIATICA: Primary | ICD-10-CM

## 2018-03-22 PROCEDURE — 97110 THERAPEUTIC EXERCISES: CPT | Performed by: PHYSICAL THERAPIST

## 2018-03-22 PROCEDURE — 97140 MANUAL THERAPY 1/> REGIONS: CPT | Performed by: PHYSICAL THERAPIST

## 2018-03-22 PROCEDURE — G8992 OTHER PT/OT  D/C STATUS: HCPCS | Performed by: PHYSICAL THERAPIST

## 2018-03-22 PROCEDURE — G8991 OTHER PT/OT GOAL STATUS: HCPCS | Performed by: PHYSICAL THERAPIST

## 2018-03-22 NOTE — PROGRESS NOTES
Daily Note/Discharge    Today's date: 3/22/2018  Patient name: Meryle Shown  : 1980  MRN: [id number]  Referring provider: Michael Pereira MD  Dx:   Encounter Diagnosis     ICD-10-CM    1  Chronic left-sided low back pain without sciatica M54 5     G89 29        Start Time: 1720  Stop Time: 1810  Total time in clinic (min): 50 minutes     Pt is returning to PT for the first time since 18 per the POC  Standing classical lx arom:  Flexion = wfl with c/o "tightness" in his lumbar region, extension = wnl and pain-free  Neuro testing:  Pin prick testing:  Intact b/l  ANTT:  seated slump:  B/l = (+) for tension and limited ROM, long sit slump:  B/l = (+) for tension and limited ROM    FOTO has increased from 65 to 75 indicating improvement  Subjective:   His compliance with his current HEP has been fair  Pt has been taking CBD oil and feels that has been helping with his pain  He feels his sx's are worse with activity t/o the day  Assessment: Tolerated treatment well  Patient exhibited good technique with therapeutic exercises  Plan: d/c to ongoing hep  HEP updated with nerve glides (seated and long sit), lumbar flexion segmental neuro re-ed (seated and standing), and supine thoracic tissue deformation with tennis ball         Precautions: none    Daily Treatment Diary     Manual           Assessment time/education 20'            SL hip flexor stretch  B/L-3x            Assessment and education   20' 10'         Psoas STM   5'          Re-eval    20'             Exercise Diary           Seated HS stretch 20"x4            [place]: forward/backward lean 2x8 each            Supine TA + march 15x B/L            Bike  5'           Qped arm lifts  20x           Qped multif  20x           Qped hip ext  10x            LS segmental mobility at wall   12x          LS SNAG   2x6          Seated slump sliders    2 x 20         Long sit slump sliders 20         Lx flexion neuro re-ed    3         Thoracic self release with tennis ball    verbal                                                                                                        Modalities

## 2018-03-29 ENCOUNTER — APPOINTMENT (OUTPATIENT)
Dept: PHYSICAL THERAPY | Facility: REHABILITATION | Age: 38
End: 2018-03-29
Payer: COMMERCIAL

## 2018-05-03 ENCOUNTER — OFFICE VISIT (OUTPATIENT)
Dept: FAMILY MEDICINE CLINIC | Facility: CLINIC | Age: 38
End: 2018-05-03
Payer: COMMERCIAL

## 2018-05-03 VITALS
HEIGHT: 73 IN | BODY MASS INDEX: 26.66 KG/M2 | SYSTOLIC BLOOD PRESSURE: 118 MMHG | HEART RATE: 70 BPM | DIASTOLIC BLOOD PRESSURE: 80 MMHG | WEIGHT: 201.2 LBS | TEMPERATURE: 97.4 F | RESPIRATION RATE: 16 BRPM

## 2018-05-03 DIAGNOSIS — M54.5 LOW BACK PAIN, UNSPECIFIED BACK PAIN LATERALITY, UNSPECIFIED CHRONICITY, WITH SCIATICA PRESENCE UNSPECIFIED: Primary | ICD-10-CM

## 2018-05-03 PROCEDURE — 99214 OFFICE O/P EST MOD 30 MIN: CPT | Performed by: FAMILY MEDICINE

## 2018-05-03 RX ORDER — CYCLOBENZAPRINE HCL 5 MG
10 TABLET ORAL
Qty: 20 TABLET | Refills: 0 | Status: SHIPPED | OUTPATIENT
Start: 2018-05-03 | End: 2018-09-25

## 2018-05-03 RX ORDER — METHYLPREDNISOLONE 4 MG/1
TABLET ORAL
Qty: 21 TABLET | Refills: 0 | Status: SHIPPED | OUTPATIENT
Start: 2018-05-03 | End: 2018-09-25

## 2018-05-03 NOTE — PROGRESS NOTES
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 [employer]  which worsens the back pain  Patient states his pain improves when he is not at work  Denies red flag signs suggest tingling, numbness, weakness  Denies bowel or bladder incontinence  Patient may benefit from acupuncture  Have provided patient with referral   In addition, will send Rx for Medrol Dosepak as well as cyclobenzaprine to take only as needed  May take anti-inflammatories such as ibuprofen as needed as well  May apply heat  Continue with routine exercises  Return/ER parameters discussed  Relevant Medications    Methylprednisolone 4 MG TBPK    cyclobenzaprine (FLEXERIL) 5 mg tablet        I have spent 25 minutes with Patient  today in which greater than 50% of this time was spent in counseling/coordination of care regarding Diagnostic results, Prognosis, Risks and benefits of tx options, Intructions for management, Patient and family education, Importance of tx compliance, Risk factor reductions and Impressions  Patient Instructions   Chronic Back Pain   WHAT YOU NEED TO KNOW:   Chronic back pain is back pain that lasts 3 months or longer  This may include pain that has not been controlled or does not improve with treatment  Your back pain may cause weakness or pain that spreads to your arms or legs  DISCHARGE INSTRUCTIONS:   Return to the emergency department if:   · You have severe pain      · You have new signs of numbness or weakness, especially in your lower back, legs, arms, or genital area  · You lose control of your bladder or bowel movements  · You have a fever or sudden weight loss  Contact your healthcare provider if:   · You have new or worsened pain  · You have questions or concerns about your condition or care  Medicines:   · NSAIDs  help decrease swelling and pain  This medicine can be bought with or without a doctor's order  This medicine can cause stomach bleeding or kidney problems in certain people  If you take blood thinner medicine, always ask your healthcare provider if NSAIDs are safe for you  Always read the medicine label and follow the directions on it before using this medicine  · Acetaminophen  decreases pain  It is available without a doctor's order  Ask how much to take and how often to take it  Follow directions  Acetaminophen can cause liver damage if not taken correctly  · Prescription pain medicine  may also be given to decrease pain  Do not wait until the pain is severe before you take this medicine  · Muscle relaxers  help decrease muscle spasms and back pain  · Take your medicine as directed  Contact your healthcare provider if you think your medicine is not helping or if you have side effects  Tell him if you are allergic to any medicine  Keep a list of the medicines, vitamins, and herbs you take  Include the amounts, and when and why you take them  Bring the list or the pill bottles to follow-up visits  Carry your medicine list with you in case of an emergency  Follow up with your healthcare provider as directed: You may be referred to a sports medicine or spine specialist  Write down your questions so you remember to ask them during your visits  Manage your chronic back pain:   · Heat  helps decrease pain and muscle spasms  Apply heat on your back for 15 to 20 minutes every 2 hours or as often as directed       · Stay active  as much as you can without causing more pain  Ask your healthcare provider what exercises are right for you  Do not sit or lie down for long periods  This could make your back pain worse  Avoid heavy lifting until your pain is gone  · A physical therapist  can teach you exercises to help improve movement and strength, and to decrease pain  © 2017 [address] [employer] Information is for End User's use only and may not be sold, redistributed or otherwise used for commercial purposes  All illustrations and images included in CareNotes® are the copyrighted property of [employer]  or [address]  The above information is an  only  It is not intended as medical advice for individual conditions or treatments  Talk to your doctor, nurse or pharmacist before following any medical regimen to see if it is safe and effective for you  Chief Complaint     Chief Complaint   Patient presents with    Follow-up     Patient presents today for lower back pain  History of Present Illness     HPI  Patient presents with low back pain which she has had for the past 1 and half to 2 years described as a tightness and pulling  Patient states he completed physical therapy in March  Has been doing exercises as well  Patient states he has started taking CBD or ill which helps a little  Does take ibuprofen rarely  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  Has had an MRI and was seen by pain management, Dr Wade Rick  Review of Systems   Review of Systems   Constitutional: Negative for unexpected weight change  Gastrointestinal:          Denies change in bowel habits   Genitourinary: Negative for dysuria  Musculoskeletal: Positive for back pain  Neurological: Negative for weakness and numbness         Active Problem List     Patient Active Problem List   Diagnosis    Impacted cerumen of left ear    Low back pain    Mixed emotional features as adjustment reaction    Allergic rhinitis due to pollen    Anxiety    Chronic prostatitis    Gastroesophageal reflux disease without esophagitis    Hyperlipidemia    Irritable bowel syndrome with diarrhea    Total bilirubin, elevated    Dysfunction of left eustachian tube       Past Medical History:  No past medical history on file  Past Surgical History:  No past surgical history on file  Family History:  No family history on file  Social History:  Social History     Social History    Marital status: /Civil Union     Spouse name: N/A    Number of children: N/A    Years of education: N/A     Occupational History    Not on file  Social History Main Topics    Smoking status: Never Smoker    Smokeless tobacco: Never Used    Alcohol use No    Drug use: No    Sexual activity: Not on file     Other Topics Concern    Not on file     Social History Narrative    No narrative on file     I have reviewed the patient's medical history in detail; there are no changes to the history as noted in the electronic medical record  Objective     Vitals:    05/03/18 1108   BP: 118/80   Pulse: 70   Resp: 16   Temp: (!) 97 4 °F (36 3 °C)     Wt Readings from Last 3 Encounters:   05/03/18 91 3 kg (201 lb 3 2 oz)   02/27/18 90 4 kg (199 lb 3 2 oz)   02/17/18 91 3 kg (201 lb 3 2 oz)       Physical Exam   Constitutional: He is oriented to person, place, and time  He appears well-developed and well-nourished  HENT:   Head: Normocephalic and atraumatic  Mouth/Throat: Oropharynx is clear and moist    Eyes: Conjunctivae and EOM are normal  Pupils are equal, round, and reactive to light  Neck: Normal range of motion  Neck supple  Cardiovascular: Normal rate, regular rhythm and normal heart sounds  Pulmonary/Chest: Effort normal and breath sounds normal    Abdominal: Soft  Bowel sounds are normal  He exhibits no distension  There is no tenderness  Musculoskeletal: Normal range of motion   He exhibits no tenderness  Lymphadenopathy:     He has no cervical adenopathy  Neurological: He is alert and oriented to person, place, and time  Psychiatric: He has a normal mood and affect  Nursing note and vitals reviewed        Pertinent Laboratory/Diagnostic Studies:  Lab Results   Component Value Date    GLUCOSE 87 04/25/2016    BUN 13 04/25/2016    CREATININE 0 90 04/25/2016    CALCIUM 9 1 04/25/2016     04/25/2016    K 3 9 04/25/2016    CO2 31 04/25/2016     04/25/2016     Lab Results   Component Value Date    BILITOT 1 35 (H) 11/11/2016       Lab Results   Component Value Date    WBC 4 31 11/11/2016    HGB 15 5 11/11/2016    HCT 46 1 11/11/2016    MCV 87 11/11/2016     11/11/2016       No results found for: TSH    Lab Results   Component Value Date    CHOL 197 11/11/2016     Lab Results   Component Value Date    TRIG 48 11/11/2016     Lab Results   Component Value Date    HDL 44 11/11/2016     Lab Results   Component Value Date    LDLCALC 143 (H) 11/11/2016     No results found for: HGBA1C    Results for orders placed or performed in visit on 11/11/16   CBC and differential   Result Value Ref Range    WBC 4 31 4 31 - 10 16 Thousand/uL    RBC 5 32 3 88 - 5 62 Million/uL    Hemoglobin 15 5 12 0 - 17 0 g/dL    Hematocrit 46 1 36 5 - 49 3 %    MCV 87 82 - 98 fL    MCH 29 1 26 8 - 34 3 pg    MCHC 33 6 31 4 - 37 4 g/dL    RDW 12 4 11 6 - 15 1 %    MPV 9 8 8 9 - 12 7 fL    Platelets 709 727 - 991 Thousands/uL    nRBC 0 /100 WBCs    Neutrophils Relative 51 43 - 75 %    Lymphocytes Relative 34 14 - 44 %    Monocytes Relative 10 4 - 12 %    Eosinophils Relative 4 0 - 6 %    Basophils Relative 1 0 - 1 %    Neutrophils Absolute 2 28 1 85 - 7 62 Thousands/µL    Lymphocytes Absolute 1 45 0 60 - 4 47 Thousands/µL    Monocytes Absolute 0 41 0 17 - 1 22 Thousand/µL    Eosinophils Absolute 0 15 0 00 - 0 61 Thousand/µL    Basophils Absolute 0 02 0 00 - 0 10 Thousands/µL   Bilirubin, total   Result Value Ref Range Total Bilirubin 1 35 (H) 0 20 - 1 00 mg/dL   Lipid Panel with Direct LDL reflex   Result Value Ref Range    Cholesterol 197 50 - 200 mg/dL    Triglycerides 48 <=150 mg/dL    HDL, Direct 44 40 - 60 mg/dL    LDL Calculated 143 (H) 0 - 100 mg/dL       No orders of the defined types were placed in this encounter  ALLERGIES:  No Known Allergies    Current Medications     Current Outpatient Prescriptions   Medication Sig Dispense Refill    cetirizine (ZyrTEC) 10 mg tablet Take 1 tablet by mouth as needed        fluticasone (FLONASE) 50 mcg/act nasal spray 1-2 sprays into each nostril as needed        ibuprofen (MOTRIN) 400 mg tablet Take 400 mg by mouth as needed for mild pain        naproxen sodium (ALEVE) 220 MG tablet Take 220 mg by mouth as needed        patient supplied medication       cyclobenzaprine (FLEXERIL) 5 mg tablet Take 2 tablets (10 mg total) by mouth daily at bedtime As needed 20 tablet 0    Methylprednisolone 4 MG TBPK Use as directed on package 21 tablet 0     No current facility-administered medications for this visit  Health Maintenance     Health Maintenance   Topic Date Due    HIV SCREENING  1980    DTaP,Tdap,and Td Vaccines (1 - Tdap) 02/17/2002    INFLUENZA VACCINE  09/01/2018    Depression Screening PHQ-9  02/27/2019       There is no immunization history on file for this patient      Titi Johnson MD

## 2018-05-03 NOTE — PATIENT INSTRUCTIONS
Chronic Back Pain   WHAT YOU NEED TO KNOW:   Chronic back pain is back pain that lasts 3 months or longer  This may include pain that has not been controlled or does not improve with treatment  Your back pain may cause weakness or pain that spreads to your arms or legs  DISCHARGE INSTRUCTIONS:   Return to the emergency department if:   · You have severe pain  · You have new signs of numbness or weakness, especially in your lower back, legs, arms, or genital area  · You lose control of your bladder or bowel movements  · You have a fever or sudden weight loss  Contact your healthcare provider if:   · You have new or worsened pain  · You have questions or concerns about your condition or care  Medicines:   · NSAIDs  help decrease swelling and pain  This medicine can be bought with or without a doctor's order  This medicine can cause stomach bleeding or kidney problems in certain people  If you take blood thinner medicine, always ask your healthcare provider if NSAIDs are safe for you  Always read the medicine label and follow the directions on it before using this medicine  · Acetaminophen  decreases pain  It is available without a doctor's order  Ask how much to take and how often to take it  Follow directions  Acetaminophen can cause liver damage if not taken correctly  · Prescription pain medicine  may also be given to decrease pain  Do not wait until the pain is severe before you take this medicine  · Muscle relaxers  help decrease muscle spasms and back pain  · Take your medicine as directed  Contact your healthcare provider if you think your medicine is not helping or if you have side effects  Tell him if you are allergic to any medicine  Keep a list of the medicines, vitamins, and herbs you take  Include the amounts, and when and why you take them  Bring the list or the pill bottles to follow-up visits  Carry your medicine list with you in case of an emergency    Follow up with your healthcare provider as directed: You may be referred to a sports medicine or spine specialist  Write down your questions so you remember to ask them during your visits  Manage your chronic back pain:   · Heat  helps decrease pain and muscle spasms  Apply heat on your back for 15 to 20 minutes every 2 hours or as often as directed  · Stay active  as much as you can without causing more pain  Ask your healthcare provider what exercises are right for you  Do not sit or lie down for long periods  This could make your back pain worse  Avoid heavy lifting until your pain is gone  · A physical therapist  can teach you exercises to help improve movement and strength, and to decrease pain  © 2017 [address] [employer] Information is for End User's use only and may not be sold, redistributed or otherwise used for commercial purposes  All illustrations and images included in CareNotes® are the copyrighted property of [employer]  or [employer]  The above information is an  only  It is not intended as medical advice for individual conditions or treatments  Talk to your doctor, nurse or pharmacist before following any medical regimen to see if it is safe and effective for you

## 2018-05-05 NOTE — ASSESSMENT & PLAN NOTE
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

## 2018-09-25 ENCOUNTER — OFFICE VISIT (OUTPATIENT)
Dept: FAMILY MEDICINE CLINIC | Facility: CLINIC | Age: 38
End: 2018-09-25
Payer: COMMERCIAL

## 2018-09-25 VITALS
SYSTOLIC BLOOD PRESSURE: 120 MMHG | RESPIRATION RATE: 16 BRPM | DIASTOLIC BLOOD PRESSURE: 78 MMHG | TEMPERATURE: 97.2 F | HEART RATE: 60 BPM | WEIGHT: 205.8 LBS | BODY MASS INDEX: 27.28 KG/M2 | HEIGHT: 73 IN

## 2018-09-25 DIAGNOSIS — G89.29 CHRONIC MIDLINE LOW BACK PAIN WITHOUT SCIATICA: ICD-10-CM

## 2018-09-25 DIAGNOSIS — M54.50 CHRONIC MIDLINE LOW BACK PAIN WITHOUT SCIATICA: ICD-10-CM

## 2018-09-25 DIAGNOSIS — Z00.00 PHYSICAL EXAM: Primary | ICD-10-CM

## 2018-09-25 LAB
SL AMB  POCT GLUCOSE, UA: NORMAL
SL AMB LEUKOCYTE ESTERASE,UA: NORMAL
SL AMB POCT BILIRUBIN,UA: NORMAL
SL AMB POCT BLOOD,UA: NORMAL
SL AMB POCT CLARITY,UA: CLEAR
SL AMB POCT COLOR,UA: YELLOW
SL AMB POCT KETONES,UA: NORMAL
SL AMB POCT NITRITE,UA: NORMAL
SL AMB POCT PH,UA: 5
SL AMB POCT SPECIFIC GRAVITY,UA: 1.02
SL AMB POCT URINE PROTEIN: NORMAL
SL AMB POCT UROBILINOGEN: 0.2

## 2018-09-25 PROCEDURE — 99395 PREV VISIT EST AGE 18-39: CPT | Performed by: FAMILY MEDICINE

## 2018-09-25 PROCEDURE — 81003 URINALYSIS AUTO W/O SCOPE: CPT | Performed by: FAMILY MEDICINE

## 2018-09-26 PROBLEM — Z00.00 PHYSICAL EXAM: Status: ACTIVE | Noted: 2018-09-26

## 2018-09-26 NOTE — PROGRESS NOTES
FAMILY PRACTICE OFFICE VISIT       NAME: Pretty Lind  AGE: 41 y o  SEX: male       : 1980        MRN: [id number]    DATE: 2018  TIME: 6:58 AM    Assessment and Plan     Problem List Items Addressed This Visit     Low back pain    Relevant Orders    HLA-B27 antigen    EILEEN Screen w/ Reflex to Titer/Pattern    Sedimentation rate, automated    Lyme Antibody Profile with reflex to WB    RF Screen w/ Reflex to Titer    Physical exam - Primary    Relevant Orders    POCT urine dip auto non-scope (Completed)    CBC    Comprehensive metabolic panel    TSH, 3rd generation        Patient appears to be in stable health  He will obtain blood work as ordered for further evaluation  If all blood tests are within normal limits we may consider rheumatology evaluation for his chronic symptoms  Patient refused annual flu vaccine    There are no Patient Instructions on file for this visit  Chief Complaint     Chief Complaint   Patient presents with    Well Check     Pt is for full blood work panel       History of Present Illness     Patient denies any recent illness  He continues to describe chronic low back ache  Mornings are worse than during the day  He is been to pain management as well as physical therapy without relief in symptoms  I reviewed his MRI results which showed minimal disc protrusion at L5-S1  He does not describe any radicular type of pain  He is a nonsmoker  He does not obtain a regular form of exercise outside of work  Review of Systems   Review of Systems   Constitutional: Negative  HENT: Negative  Eyes: Negative  Respiratory: Negative  Cardiovascular: Negative  Gastrointestinal: Negative  Genitourinary: Negative  Musculoskeletal:        As per HPI   Neurological: Negative  Psychiatric/Behavioral: Negative          Active Problem List     Patient Active Problem List   Diagnosis    Impacted cerumen of left ear    Low back pain    Mixed emotional features as adjustment reaction    Allergic rhinitis due to pollen    Anxiety    Chronic prostatitis    Gastroesophageal reflux disease without esophagitis    Hyperlipidemia    Irritable bowel syndrome with diarrhea    Total bilirubin, elevated    Dysfunction of left eustachian tube    Physical exam       Past Medical History:  No past medical history on file  Past Surgical History:  No past surgical history on file  Family History:  Family History   Problem Relation Age of Onset    Melanoma Mother        Social History:  Social History     Social History    Marital status: /Civil Union     Spouse name: N/A    Number of children: N/A    Years of education: N/A     Occupational History    Not on file  Social History Main Topics    Smoking status: Never Smoker    Smokeless tobacco: Never Used    Alcohol use No      Comment: Occasional use per [employer]     Drug use: No    Sexual activity: Not on file     Other Topics Concern    Not on file     Social History Narrative    No narrative on file       Objective     Vitals:    09/25/18 1832   BP: 120/78   Pulse: 60   Resp: 16   Temp: (!) 97 2 °F (36 2 °C)     Wt Readings from Last 3 Encounters:   09/25/18 93 4 kg (205 lb 12 8 oz)   05/03/18 91 3 kg (201 lb 3 2 oz)   02/27/18 90 4 kg (199 lb 3 2 oz)       Physical Exam   Constitutional: No distress  HENT:   Mouth/Throat: Oropharynx is clear and moist  No oropharyngeal exudate  Tympanic membranes within normal limits bilaterally   Neck:   No carotid bruit   Cardiovascular:   Regular rate and rhythm with no murmurs   Pulmonary/Chest:   Lungs are clear to auscultation without wheezes,rales, or rhonchi   Abdominal:   Abdomen is soft, nontender with positive bowel sounds  There is no rebound or guarding  No masses palpated   Musculoskeletal: He exhibits no edema  Patient without vertebral tenderness of lumbar spine  Muscle strength 5/5 lower extremities    Normal range of motion of spine but patient complains of increased discomfort with extreme flexion of lumbar spine   Lymphadenopathy:     He has no cervical adenopathy  Psychiatric: He has a normal mood and affect   His behavior is normal  Judgment and thought content normal        Pertinent Laboratory/Diagnostic Studies:  Lab Results   Component Value Date    BUN 13 04/25/2016    CREATININE 0 90 04/25/2016    CALCIUM 9 1 04/25/2016     04/25/2016    K 3 9 04/25/2016    CO2 31 04/25/2016     04/25/2016     No results found for: ALT, AST, GGT, ALKPHOS, BILITOT    Lab Results   Component Value Date    WBC 4 31 11/11/2016    HGB 15 5 11/11/2016    HCT 46 1 11/11/2016    MCV 87 11/11/2016     11/11/2016       No results found for: TSH    No results found for: CHOL  Lab Results   Component Value Date    TRIG 48 11/11/2016     Lab Results   Component Value Date    HDL 44 11/11/2016     Lab Results   Component Value Date    LDLCALC 143 (H) 11/11/2016     No results found for: HGBA1C    Results for orders placed or performed in visit on 09/25/18   POCT urine dip auto non-scope   Result Value Ref Range     COLOR,UA yellow      CLARITY,UA clear     SPECIFIC GRAVITY,UA 1 020      PH,UA 5 0     LEUKOCYTE ESTERASE,UA -     NITRITE,UA -     GLUCOSE, UA -     KETONES,UA -      BILIRUBIN,UA -      BLOOD,UA -     SL AMB POCT URINE PROTEIN -     SL AMB POCT UROBILINOGEN 0 2        Orders Placed This Encounter   Procedures    HLA-B27 antigen    EILEEN Screen w/ Reflex to Titer/Pattern    Sedimentation rate, automated    Lyme Antibody Profile with reflex to WB    RF Screen w/ Reflex to Titer    CBC    Comprehensive metabolic panel    TSH, 3rd generation    POCT urine dip auto non-scope       ALLERGIES:  No Known Allergies    Current Medications     Current Outpatient Prescriptions   Medication Sig Dispense Refill    cetirizine (ZyrTEC) 10 mg tablet Take 1 tablet by mouth as needed        fluticasone (FLONASE) 50 mcg/act nasal spray 1-2 sprays into each nostril as needed        ibuprofen (MOTRIN) 400 mg tablet Take 400 mg by mouth as needed for mild pain        naproxen sodium (ALEVE) 220 MG tablet Take 220 mg by mouth as needed         No current facility-administered medications for this visit  Health Maintenance     Health Maintenance   Topic Date Due    DTaP,Tdap,and Td Vaccines (1 - Tdap) 02/17/2002    INFLUENZA VACCINE  09/01/2018    Depression Screening PHQ  02/27/2019       There is no immunization history on file for this patient      Maddison Ellsworth MD

## 2018-09-29 ENCOUNTER — APPOINTMENT (OUTPATIENT)
Dept: LAB | Facility: CLINIC | Age: 38
End: 2018-09-29
Payer: COMMERCIAL

## 2018-09-29 DIAGNOSIS — G89.29 CHRONIC MIDLINE LOW BACK PAIN WITHOUT SCIATICA: ICD-10-CM

## 2018-09-29 DIAGNOSIS — M54.50 CHRONIC MIDLINE LOW BACK PAIN WITHOUT SCIATICA: ICD-10-CM

## 2018-09-29 DIAGNOSIS — Z00.00 PHYSICAL EXAM: ICD-10-CM

## 2018-09-29 LAB
ALBUMIN SERPL BCP-MCNC: 4.1 G/DL (ref 3.5–5)
ALP SERPL-CCNC: 58 U/L (ref 46–116)
ALT SERPL W P-5'-P-CCNC: 29 U/L (ref 12–78)
ANION GAP SERPL CALCULATED.3IONS-SCNC: 2 MMOL/L (ref 4–13)
AST SERPL W P-5'-P-CCNC: 17 U/L (ref 5–45)
BILIRUB SERPL-MCNC: 1.2 MG/DL (ref 0.2–1)
BUN SERPL-MCNC: 18 MG/DL (ref 5–25)
CALCIUM SERPL-MCNC: 9.1 MG/DL (ref 8.3–10.1)
CHLORIDE SERPL-SCNC: 102 MMOL/L (ref 100–108)
CO2 SERPL-SCNC: 32 MMOL/L (ref 21–32)
CREAT SERPL-MCNC: 1 MG/DL (ref 0.6–1.3)
ERYTHROCYTE [DISTWIDTH] IN BLOOD BY AUTOMATED COUNT: 12.3 % (ref 11.6–15.1)
ERYTHROCYTE [SEDIMENTATION RATE] IN BLOOD: 1 MM/HOUR (ref 0–10)
GFR SERPL CREATININE-BSD FRML MDRD: 96 ML/MIN/1.73SQ M
GLUCOSE P FAST SERPL-MCNC: 83 MG/DL (ref 65–99)
HCT VFR BLD AUTO: 48.1 % (ref 36.5–49.3)
HGB BLD-MCNC: 15.7 G/DL (ref 12–17)
MCH RBC QN AUTO: 28.6 PG (ref 26.8–34.3)
MCHC RBC AUTO-ENTMCNC: 32.6 G/DL (ref 31.4–37.4)
MCV RBC AUTO: 88 FL (ref 82–98)
PLATELET # BLD AUTO: 235 THOUSANDS/UL (ref 149–390)
PMV BLD AUTO: 9.7 FL (ref 8.9–12.7)
POTASSIUM SERPL-SCNC: 3.9 MMOL/L (ref 3.5–5.3)
PROT SERPL-MCNC: 8.1 G/DL (ref 6.4–8.2)
RBC # BLD AUTO: 5.49 MILLION/UL (ref 3.88–5.62)
SODIUM SERPL-SCNC: 136 MMOL/L (ref 136–145)
TSH SERPL DL<=0.05 MIU/L-ACNC: 1.23 UIU/ML (ref 0.36–3.74)
WBC # BLD AUTO: 4.98 THOUSAND/UL (ref 4.31–10.16)

## 2018-09-29 PROCEDURE — 81374 HLA I TYPING 1 ANTIGEN LR: CPT

## 2018-09-29 PROCEDURE — 86038 ANTINUCLEAR ANTIBODIES: CPT

## 2018-09-29 PROCEDURE — 86430 RHEUMATOID FACTOR TEST QUAL: CPT

## 2018-09-29 PROCEDURE — 36415 COLL VENOUS BLD VENIPUNCTURE: CPT

## 2018-09-29 PROCEDURE — 80053 COMPREHEN METABOLIC PANEL: CPT

## 2018-09-29 PROCEDURE — 84443 ASSAY THYROID STIM HORMONE: CPT

## 2018-09-29 PROCEDURE — 85027 COMPLETE CBC AUTOMATED: CPT

## 2018-09-29 PROCEDURE — 86618 LYME DISEASE ANTIBODY: CPT

## 2018-09-29 PROCEDURE — 85652 RBC SED RATE AUTOMATED: CPT

## 2018-09-30 LAB
B BURGDOR IGG SER IA-ACNC: 0.1
B BURGDOR IGM SER IA-ACNC: 0.13

## 2018-10-01 LAB
RHEUMATOID FACT SER QL LA: NEGATIVE
RYE IGE QN: NEGATIVE

## 2018-10-09 ENCOUNTER — TELEPHONE (OUTPATIENT)
Dept: FAMILY MEDICINE CLINIC | Facility: CLINIC | Age: 38
End: 2018-10-09

## 2018-10-09 DIAGNOSIS — E78.5 HYPERLIPIDEMIA, UNSPECIFIED HYPERLIPIDEMIA TYPE: Primary | ICD-10-CM

## 2018-10-09 LAB — HLA-B27 QL NAA+PROBE: NEGATIVE

## 2018-10-10 ENCOUNTER — TELEPHONE (OUTPATIENT)
Dept: FAMILY MEDICINE CLINIC | Facility: CLINIC | Age: 38
End: 2018-10-10

## 2018-10-10 NOTE — TELEPHONE ENCOUNTER
----- Message from Ana Andrade MD sent at 10/1/5437  7:52 PM EDT -----  All recent blood tests regarding his back pain were read as negative

## 2018-10-10 NOTE — TELEPHONE ENCOUNTER
----- Message from Carola Hoskins MD sent at 57/8/3127  7:52 PM EDT -----  All recent blood tests regarding his back pain were read as negative

## 2018-10-10 NOTE — TELEPHONE ENCOUNTER
Recent blood work was testing for any possible evidence of Lyme disease, rheumatoid arthritis, lupus, ankylosing spondylitis which are all things that could possibly cause patient's symptoms of back pain  However all testing was normal   He does have some disc protrusion at the L5-S1 joint as seen on MRI    If physical therapy was not helpful I recommend seeing Dr Brian Reyes once again at [facility] pain management to discuss any other possible treatment options

## 2018-10-10 NOTE — TELEPHONE ENCOUNTER
----- Message from Irvin German MD sent at 15/2/1853  7:52 PM EDT -----  All recent blood tests regarding his back pain were read as negative

## 2018-12-07 ENCOUNTER — OFFICE VISIT (OUTPATIENT)
Dept: FAMILY MEDICINE CLINIC | Facility: CLINIC | Age: 38
End: 2018-12-07
Payer: COMMERCIAL

## 2018-12-07 VITALS
HEIGHT: 73 IN | DIASTOLIC BLOOD PRESSURE: 70 MMHG | SYSTOLIC BLOOD PRESSURE: 102 MMHG | HEART RATE: 60 BPM | RESPIRATION RATE: 16 BRPM | WEIGHT: 204.6 LBS | TEMPERATURE: 97.5 F | BODY MASS INDEX: 27.11 KG/M2

## 2018-12-07 DIAGNOSIS — H10.502 BLEPHAROCONJUNCTIVITIS OF LEFT EYE, UNSPECIFIED BLEPHAROCONJUNCTIVITIS TYPE: ICD-10-CM

## 2018-12-07 DIAGNOSIS — Z28.21 REFUSED INFLUENZA VACCINE: Primary | ICD-10-CM

## 2018-12-07 PROCEDURE — 3008F BODY MASS INDEX DOCD: CPT | Performed by: FAMILY MEDICINE

## 2018-12-07 PROCEDURE — 99212 OFFICE O/P EST SF 10 MIN: CPT | Performed by: FAMILY MEDICINE

## 2018-12-07 RX ORDER — TOBRAMYCIN 3 MG/ML
SOLUTION/ DROPS OPHTHALMIC
Qty: 1 BOTTLE | Refills: 0 | Status: SHIPPED | OUTPATIENT
Start: 2018-12-07 | End: 2019-03-05

## 2018-12-07 NOTE — PROGRESS NOTES
FAMILY PRACTICE OFFICE VISIT       NAME: Juliane Vargas  AGE: 41 y o  SEX: male       : 1980        MRN: [id number]    DATE: 2018  TIME: 6:11 PM    Assessment and Plan     Problem List Items Addressed This Visit     Refused influenza vaccine - Primary    Blepharoconjunctivitis of left eye    Relevant Medications    tobramycin (TOBREX) 0 3 % SOLN          Patient was given prescription for Tobrex ophthalmic drops to use 2 drops q i d  To left eye for 5-7 days  Patient will call if symptoms persist after medication completed  There are no Patient Instructions on file for this visit  Chief Complaint     Chief Complaint   Patient presents with    Conjunctivitis     Pt is here for possible left pink eye 2 + days       History of Present Illness     Patient states his daughter will was diagnosed and treated for conjunctivitis recently  Yesterday patient began experiencing irritation of left eye when he experience gritty feeling  He wears glasses but no contact lenses  He denies any other recent illness        Review of Systems   Review of Systems   Constitutional: Negative  HENT: Negative  Eyes:        As per HPI       Active Problem List     Patient Active Problem List   Diagnosis    Impacted cerumen of left ear    Low back pain    Mixed emotional features as adjustment reaction    Allergic rhinitis due to pollen    Anxiety    Chronic prostatitis    Gastroesophageal reflux disease without esophagitis    Hyperlipidemia    Irritable bowel syndrome with diarrhea    Total bilirubin, elevated    Dysfunction of left eustachian tube    Physical exam    Refused influenza vaccine    Blepharoconjunctivitis of left eye       Past Medical History:  No past medical history on file  Past Surgical History:  No past surgical history on file      Family History:  Family History   Problem Relation Age of Onset   Joycelyndeedee Abhijit Melanoma Mother        Social History:  Social History     Social History  Marital status: /Civil Union     Spouse name: N/A    Number of children: N/A    Years of education: N/A     Occupational History    Not on file  Social History Main Topics    Smoking status: Never Smoker    Smokeless tobacco: Never Used    Alcohol use No      Comment: Occasional use per [employer]     Drug use: No    Sexual activity: Not on file     Other Topics Concern    Not on file     Social History Narrative    No narrative on file       Objective     Vitals:    12/07/18 1602   BP: 102/70   Pulse: 60   Resp: 16   Temp: 97 5 °F (36 4 °C)     Wt Readings from Last 3 Encounters:   12/07/18 92 8 kg (204 lb 9 6 oz)   09/25/18 93 4 kg (205 lb 12 8 oz)   05/03/18 91 3 kg (201 lb 3 2 oz)       Physical Exam   Constitutional: No distress  HENT:   Mouth/Throat: Oropharynx is clear and moist  No oropharyngeal exudate  Tympanic membranes within normal limits bilaterally   Eyes:   Patient's sclera of left eye appears mildly injected  No discharge noted  Extraocular movements intact  Pupils equal round reactive to light  Right eye sclera was clear   Lymphadenopathy:     He has no cervical adenopathy         Pertinent Laboratory/Diagnostic Studies:  Lab Results   Component Value Date    BUN 18 09/29/2018    CREATININE 1 00 09/29/2018    CALCIUM 9 1 09/29/2018    K 3 9 09/29/2018    CO2 32 09/29/2018     09/29/2018     Lab Results   Component Value Date    ALT 29 09/29/2018    AST 17 09/29/2018    ALKPHOS 58 09/29/2018       Lab Results   Component Value Date    WBC 4 98 09/29/2018    HGB 15 7 09/29/2018    HCT 48 1 09/29/2018    MCV 88 09/29/2018     09/29/2018       No results found for: TSH    No results found for: CHOL  Lab Results   Component Value Date    TRIG 48 11/11/2016     Lab Results   Component Value Date    HDL 44 11/11/2016     Lab Results   Component Value Date    LDLCALC 143 (H) 11/11/2016     No results found for: HGBA1C    Results for orders placed or performed in visit on 09/29/18   HLA-B27 antigen   Result Value Ref Range    HLA B27 Negative    EILEEN Screen w/ Reflex to Titer/Pattern   Result Value Ref Range    EILEEN Negative Negative   Sedimentation rate, automated   Result Value Ref Range    Sed Rate 1 0 - 10 mm/hour   Lyme Antibody Profile with reflex to WB   Result Value Ref Range    LYME AB IGG 0 10 0 00 - 0 79    LYME AB IGM 0 13 0 00 - 0 79   RF Screen w/ Reflex to Titer   Result Value Ref Range    Rheumatoid Factor Negative Negative   CBC   Result Value Ref Range    WBC 4 98 4 31 - 10 16 Thousand/uL    RBC 5 49 3 88 - 5 62 Million/uL    Hemoglobin 15 7 12 0 - 17 0 g/dL    Hematocrit 48 1 36 5 - 49 3 %    MCV 88 82 - 98 fL    MCH 28 6 26 8 - 34 3 pg    MCHC 32 6 31 4 - 37 4 g/dL    RDW 12 3 11 6 - 15 1 %    Platelets 501 930 - 511 Thousands/uL    MPV 9 7 8 9 - 12 7 fL   Comprehensive metabolic panel   Result Value Ref Range    Sodium 136 136 - 145 mmol/L    Potassium 3 9 3 5 - 5 3 mmol/L    Chloride 102 100 - 108 mmol/L    CO2 32 21 - 32 mmol/L    ANION GAP 2 (L) 4 - 13 mmol/L    BUN 18 5 - 25 mg/dL    Creatinine 1 00 0 60 - 1 30 mg/dL    Glucose, Fasting 83 65 - 99 mg/dL    Calcium 9 1 8 3 - 10 1 mg/dL    AST 17 5 - 45 U/L    ALT 29 12 - 78 U/L    Alkaline Phosphatase 58 46 - 116 U/L    Total Protein 8 1 6 4 - 8 2 g/dL    Albumin 4 1 3 5 - 5 0 g/dL    Total Bilirubin 1 20 (H) 0 20 - 1 00 mg/dL    eGFR 96 ml/min/1 73sq m   TSH, 3rd generation   Result Value Ref Range    TSH 3RD GENERATON 1 230 0 358 - 3 740 uIU/mL       No orders of the defined types were placed in this encounter        ALLERGIES:  No Known Allergies    Current Medications     Current Outpatient Prescriptions   Medication Sig Dispense Refill    cetirizine (ZyrTEC) 10 mg tablet Take 1 tablet by mouth as needed        fluticasone (FLONASE) 50 mcg/act nasal spray 1-2 sprays into each nostril as needed        ibuprofen (MOTRIN) 400 mg tablet Take 400 mg by mouth as needed for mild pain        naproxen sodium (ALEVE) 220 MG tablet Take 220 mg by mouth as needed        TURMERIC PO Take by mouth      tobramycin (TOBREX) 0 3 % SOLN 2 tabs qid to eyes X 5-7 days 1 Bottle 0     No current facility-administered medications for this visit  Health Maintenance     Health Maintenance   Topic Date Due    DTaP,Tdap,and Td Vaccines (1 - Tdap) 02/17/2002    INFLUENZA VACCINE  07/01/2018    Depression Screening PHQ  02/27/2019     There is no immunization history for the selected administration types on file for this patient      Ana Andrade MD

## 2019-03-01 RX ORDER — FEXOFENADINE HCL 180 MG/1
180 TABLET ORAL
COMMUNITY
End: 2019-09-17 | Stop reason: ALTCHOICE

## 2019-03-01 RX ORDER — BACITRACIN, NEOMYCIN, POLYMYXIN B 400; 3.5; 5 [USP'U]/G; MG/G; [USP'U]/G
OINTMENT TOPICAL
COMMUNITY
End: 2019-03-05

## 2019-03-01 RX ORDER — ALBUTEROL SULFATE 90 UG/1
AEROSOL, METERED RESPIRATORY (INHALATION)
COMMUNITY
Start: 2014-08-24 | End: 2019-03-05

## 2019-03-05 ENCOUNTER — OFFICE VISIT (OUTPATIENT)
Dept: FAMILY MEDICINE CLINIC | Facility: CLINIC | Age: 39
End: 2019-03-05
Payer: COMMERCIAL

## 2019-03-05 VITALS
TEMPERATURE: 97.3 F | WEIGHT: 211.8 LBS | DIASTOLIC BLOOD PRESSURE: 78 MMHG | RESPIRATION RATE: 16 BRPM | HEART RATE: 64 BPM | HEIGHT: 73 IN | BODY MASS INDEX: 28.07 KG/M2 | SYSTOLIC BLOOD PRESSURE: 118 MMHG

## 2019-03-05 DIAGNOSIS — M54.50 CHRONIC BILATERAL LOW BACK PAIN WITHOUT SCIATICA: Primary | ICD-10-CM

## 2019-03-05 DIAGNOSIS — M25.561 CHRONIC PAIN OF RIGHT KNEE: ICD-10-CM

## 2019-03-05 DIAGNOSIS — Z28.21 REFUSED INFLUENZA VACCINE: ICD-10-CM

## 2019-03-05 DIAGNOSIS — G89.29 CHRONIC PAIN OF RIGHT KNEE: ICD-10-CM

## 2019-03-05 DIAGNOSIS — G89.29 CHRONIC BILATERAL LOW BACK PAIN WITHOUT SCIATICA: Primary | ICD-10-CM

## 2019-03-05 PROCEDURE — 99214 OFFICE O/P EST MOD 30 MIN: CPT | Performed by: FAMILY MEDICINE

## 2019-03-05 PROCEDURE — 3008F BODY MASS INDEX DOCD: CPT | Performed by: FAMILY MEDICINE

## 2019-03-05 RX ORDER — TIZANIDINE 4 MG/1
4 TABLET ORAL
Qty: 30 TABLET | Refills: 1 | Status: SHIPPED | OUTPATIENT
Start: 2019-03-05 | End: 2019-07-18

## 2019-03-05 NOTE — PROGRESS NOTES
FAMILY PRACTICE OFFICE VISIT       NAME: Poly Coyne  AGE: 46 y o  SEX: male       : 1980        MRN: [id number]        Assessment and Plan     Problem List Items Addressed This Visit        Other    Low back pain - Primary    Relevant Medications    tiZANidine (ZANAFLEX) 4 mg tablet    Other Relevant Orders    XR spine lumbar complete w bending minimum 6 views    XR tibia fibula 2 vw right    Sjogren's Antibodies    Lyme Antibody Profile with reflex to WB    C-reactive protein    Vitamin D 25 hydroxy    CK (with reflex to MB)    Refused influenza vaccine    Relevant Orders    PREFERRED: influenza vaccine, [id number], quadrivalent, recombinant, PF, 0 5 mL (FLUBLOK)      Other Visit Diagnoses     Chronic pain of right knee        Relevant Orders    XR knee 3 vw right non injury    XR tibia fibula 2 vw right       patient presents for evaluation of chronic low back pain  Extensive workup including imaging studies, blood work, evaluations by family doctor and Spine and Pain Management is unrevealing  Patient is dealing with significant degree of daily discomfort affecting his quality of life  Will proceed with repeat x-ray of lumbar spine with flexion and extension  I would like to further evaluate his chronic right knee pain  Reportedly, patient had significant trauma and extensive surgery  I wonder if there is component of leg length discrepancy and or significant right knee arthritis contributing to his chronic low back pain  Will proceed with blood work as outlined above  His exam today is consistent with significant muscle spasm  I advised patient to start tizanidine 4 mg q h s  On a daily basis at least for the next few weeks to assess response to the medication  Will follow up pending his imaging studies and blood work results    We may consider consultation with Orthopedic surgery and possibly further investigating possibility of leg length discrepancy/ I cannot completely exclude possibility of seronegative spondylitis as well  I have spent 25 minutes with Patient  today in which greater than 50% of this time was spent in counseling/coordination of care regarding Diagnostic results, Prognosis, Risks and benefits of tx options, Intructions for management, Patient and family education, Importance of tx compliance, Risk factor reductions and Impressions  There are no Patient Instructions on file for this visit  M*[email or web address] software was used to dictate this note  It may contain errors with dictating incorrect words/spelling  Please contact provider directly with any questions  Chief Complaint     Chief Complaint   Patient presents with    Back Pain     Pt is here for back stiffness, soreness  2 + yrs       History of Present Illness     Patient presents for consultation regarding chronic low back pain  His symptoms started 2 years ago  No preceding injury or fall  Patient denies symptoms of lower extremity radiculopathy, numbness, tingling, or weakness  Patient had extensive workup including evaluations by primary care physician, imaging including x-ray, MRI of lumbar sacral spine, consultation with pain management as well as blood work to rule out possibility of connective tissue disease  Specifically, HLA B27 testing was negative  Patient remains under ongoing chiropractic care with temporary and intermittent improvement of symptoms after treatments with recurrence of symptoms  His pain has become persistent, Daily, affecting his activities and family life  Patient is working as a  and is standing all day at work  He is usually back pain free upon waking up in the morning  His symptoms are getting progressively worse throughout the day  Pain is aggravated with standing  Patient has tried ibuprofen, heat pack and ice pack at night     Reportedly, he was prescribed muscle relaxant in the past, use lower dose for a few days, inconsistent and has discontinued due to lack of efficacy  Interestingly, patient had significant right leg injury approximately 20 years ago requiring extensive right knee surgery with few plates and screws  He has been experiencing chronic right knee pain ever since  Patient denies any other myalgias arthralgias  He denies joint stiffness or edema  No rash  No headaches  He reportedly has been in generally good health aside from chronic low back pain  Back Pain         Review of Systems   Review of Systems   Constitutional: Negative  HENT: Negative  Eyes: Negative  Respiratory: Negative  Cardiovascular: Negative  Gastrointestinal: Negative  Endocrine: Negative  Genitourinary: Negative  Musculoskeletal: Positive for arthralgias (Chronic right knee pain) and back pain  Allergic/Immunologic: Negative  Neurological: Negative  Hematological: Negative  Psychiatric/Behavioral: Negative  Active Problem List     Patient Active Problem List   Diagnosis    Impacted cerumen of left ear    Low back pain    Mixed emotional features as adjustment reaction    Allergic rhinitis due to pollen    Anxiety    Chronic prostatitis    Gastroesophageal reflux disease without esophagitis    Hyperlipidemia    Irritable bowel syndrome with diarrhea    Total bilirubin, elevated    Dysfunction of left eustachian tube    Physical exam    Refused influenza vaccine    Blepharoconjunctivitis of left eye       Past Medical History:  No past medical history on file  Past Surgical History:  No past surgical history on file      Family History:  Family History   Problem Relation Age of Onset   Escobar Melanoma Mother        Social History:  Social History     Socioeconomic History    Marital status: /Civil Union     Spouse name: Not on file    Number of children: Not on file    Years of education: Not on file    Highest education level: Not on file   Occupational History    Not on file   Social Needs    Financial resource strain: Not on file    Food insecurity:     Worry: Not on file     Inability: Not on file    Transportation needs:     Medical: Not on file     Non-medical: Not on file   Tobacco Use    Smoking status: Never Smoker    Smokeless tobacco: Never Used   Substance and Sexual Activity    Alcohol use: No     Comment: Occasional use per [employer]     Drug use: No    Sexual activity: Not on file   Lifestyle    Physical activity:     Days per week: Not on file     Minutes per session: Not on file    Stress: Not on file   Relationships    Social connections:     Talks on phone: Not on file     Gets together: Not on file     Attends [employer] service: Not on file     Active member of club or organization: Not on file     Attends meetings of clubs or organizations: Not on file     Relationship status: Not on file    Intimate partner violence:     Fear of current or ex partner: Not on file     Emotionally abused: Not on file     Physically abused: Not on file     Forced sexual activity: Not on file   Other Topics Concern    Not on file   Social History Narrative    Not on file       Objective     Vitals:    03/05/19 1615   BP: 118/78   Pulse: 64   Resp: 16   Temp: (!) 97 3 °F (36 3 °C)   TempSrc: Tympanic   Weight: 96 1 kg (211 lb 12 8 oz)   Height: 6' 1" (1 854 m)     Wt Readings from Last 3 Encounters:   03/05/19 96 1 kg (211 lb 12 8 oz)   12/07/18 92 8 kg (204 lb 9 6 oz)   09/25/18 93 4 kg (205 lb 12 8 oz)       Physical Exam   Constitutional: He is oriented to person, place, and time  He appears well-developed and well-nourished  HENT:   Head: Normocephalic and atraumatic  Neck: Neck supple  Carotid bruit is not present  Cardiovascular: Normal rate, regular rhythm and normal heart sounds  No murmur heard  Pulmonary/Chest: Effort normal and breath sounds normal  No respiratory distress  He has no wheezes  He has no rales     Abdominal: Bowel sounds are normal  He exhibits no distension and no abdominal bruit  Musculoskeletal: Normal range of motion  He exhibits no edema  Decreased range of motion of lumbar spine, painful flexion at 60-70 degrees  Paraspinal spasm lumbar sacral spine, negative straight leg raising bilaterally  No scoliosis  Right knee:  Arthritic deformities, no edema, no calf tenderness   Neurological: He is alert and oriented to person, place, and time  No cranial nerve deficit  Psychiatric: He has a normal mood and affect  His behavior is normal    Nursing note and vitals reviewed        Pertinent Laboratory/Diagnostic Studies:  Lab Results   Component Value Date    BUN 18 09/29/2018    CREATININE 1 00 09/29/2018    CALCIUM 9 1 09/29/2018    K 3 9 09/29/2018    CO2 32 09/29/2018     09/29/2018     Lab Results   Component Value Date    ALT 29 09/29/2018    AST 17 09/29/2018    ALKPHOS 58 09/29/2018       Lab Results   Component Value Date    WBC 4 98 09/29/2018    HGB 15 7 09/29/2018    HCT 48 1 09/29/2018    MCV 88 09/29/2018     09/29/2018       No results found for: TSH    No results found for: CHOL  Lab Results   Component Value Date    TRIG 48 11/11/2016     Lab Results   Component Value Date    HDL 44 11/11/2016     Lab Results   Component Value Date    LDLCALC 143 (H) 11/11/2016     No results found for: HGBA1C    Results for orders placed or performed in visit on 09/29/18   HLA-B27 antigen   Result Value Ref Range    HLA B27 Negative    EILEEN Screen w/ Reflex to Titer/Pattern   Result Value Ref Range    EILEEN Negative Negative   Sedimentation rate, automated   Result Value Ref Range    Sed Rate 1 0 - 10 mm/hour   Lyme Antibody Profile with reflex to WB   Result Value Ref Range    LYME AB IGG 0 10 0 00 - 0 79    LYME AB IGM 0 13 0 00 - 0 79   RF Screen w/ Reflex to Titer   Result Value Ref Range    Rheumatoid Factor Negative Negative   CBC   Result Value Ref Range    WBC 4 98 4 31 - 10 16 Thousand/uL    RBC 5 49 3 88 - 5 62 Million/uL Hemoglobin 15 7 12 0 - 17 0 g/dL    Hematocrit 48 1 36 5 - 49 3 %    MCV 88 82 - 98 fL    MCH 28 6 26 8 - 34 3 pg    MCHC 32 6 31 4 - 37 4 g/dL    RDW 12 3 11 6 - 15 1 %    Platelets 488 790 - 179 Thousands/uL    MPV 9 7 8 9 - 12 7 fL   Comprehensive metabolic panel   Result Value Ref Range    Sodium 136 136 - 145 mmol/L    Potassium 3 9 3 5 - 5 3 mmol/L    Chloride 102 100 - 108 mmol/L    CO2 32 21 - 32 mmol/L    ANION GAP 2 (L) 4 - 13 mmol/L    BUN 18 5 - 25 mg/dL    Creatinine 1 00 0 60 - 1 30 mg/dL    Glucose, Fasting 83 65 - 99 mg/dL    Calcium 9 1 8 3 - 10 1 mg/dL    AST 17 5 - 45 U/L    ALT 29 12 - 78 U/L    Alkaline Phosphatase 58 46 - 116 U/L    Total Protein 8 1 6 4 - 8 2 g/dL    Albumin 4 1 3 5 - 5 0 g/dL    Total Bilirubin 1 20 (H) 0 20 - 1 00 mg/dL    eGFR 96 ml/min/1 73sq m   TSH, 3rd generation   Result Value Ref Range    TSH 3RD GENERATON 1 230 0 358 - 3 740 uIU/mL       Orders Placed This Encounter   Procedures    XR spine lumbar complete w bending minimum 6 views    XR knee 3 vw right non injury    XR tibia fibula 2 vw right    PREFERRED: influenza vaccine, [id number], quadrivalent, recombinant, PF, 0 5 mL (FLUBLOK)    Sjogren's Antibodies    Lyme Antibody Profile with reflex to WB    C-reactive protein    Vitamin D 25 hydroxy    CK (with reflex to MB)       ALLERGIES:  No Known Allergies    Current Medications     Current Outpatient Medications   Medication Sig Dispense Refill    cetirizine (ZyrTEC) 10 mg tablet Take 1 tablet by mouth as needed        fexofenadine (ALLEGRA) 180 MG tablet Take 180 mg by mouth      fluticasone (FLONASE) 50 mcg/act nasal spray 1-2 sprays into each nostril as needed        ibuprofen (MOTRIN) 400 mg tablet Take 400 mg by mouth as needed for mild pain        naproxen sodium (ALEVE) 220 MG tablet Take 220 mg by mouth as needed        TURMERIC PO Take by mouth      tiZANidine (ZANAFLEX) 4 mg tablet Take 1 tablet (4 mg total) by mouth daily at bedtime 30 tablet 1     No current facility-administered medications for this visit  Health Maintenance     Health Maintenance   Topic Date Due    BMI: Followup Plan  02/17/1999    DTaP,Tdap,and Td Vaccines (1 - Tdap) 02/17/2002    INFLUENZA VACCINE  07/01/2018    Depression Screening PHQ  02/27/2019    BMI: Adult  12/07/2019    HEPATITIS B VACCINES  Aged Out     There is no immunization history for the selected administration types on file for this patient      Yeni Brewer MD

## 2019-03-09 ENCOUNTER — APPOINTMENT (OUTPATIENT)
Dept: LAB | Facility: CLINIC | Age: 39
End: 2019-03-09
Payer: COMMERCIAL

## 2019-03-09 DIAGNOSIS — G89.29 CHRONIC BILATERAL LOW BACK PAIN WITHOUT SCIATICA: ICD-10-CM

## 2019-03-09 DIAGNOSIS — M54.50 CHRONIC BILATERAL LOW BACK PAIN WITHOUT SCIATICA: ICD-10-CM

## 2019-03-09 LAB
25(OH)D3 SERPL-MCNC: 37.9 NG/ML (ref 30–100)
CK SERPL-CCNC: 110 U/L (ref 39–308)
CRP SERPL QL: <3 MG/L

## 2019-03-09 PROCEDURE — 86140 C-REACTIVE PROTEIN: CPT

## 2019-03-09 PROCEDURE — 86618 LYME DISEASE ANTIBODY: CPT

## 2019-03-09 PROCEDURE — 82550 ASSAY OF CK (CPK): CPT

## 2019-03-09 PROCEDURE — 86235 NUCLEAR ANTIGEN ANTIBODY: CPT

## 2019-03-09 PROCEDURE — 36415 COLL VENOUS BLD VENIPUNCTURE: CPT

## 2019-03-09 PROCEDURE — 82306 VITAMIN D 25 HYDROXY: CPT

## 2019-03-10 LAB
B BURGDOR IGG SER IA-ACNC: 0.1
B BURGDOR IGM SER IA-ACNC: 0.17

## 2019-03-11 LAB
ENA SS-A AB SER-ACNC: <0.2 AI (ref 0–0.9)
ENA SS-B AB SER-ACNC: <0.2 AI (ref 0–0.9)

## 2019-03-13 ENCOUNTER — HOSPITAL ENCOUNTER (OUTPATIENT)
Dept: RADIOLOGY | Facility: HOSPITAL | Age: 39
Discharge: HOME | End: 2019-03-13
Payer: COMMERCIAL

## 2019-03-13 DIAGNOSIS — M25.561 CHRONIC PAIN OF RIGHT KNEE: ICD-10-CM

## 2019-03-13 DIAGNOSIS — M54.50 CHRONIC BILATERAL LOW BACK PAIN WITHOUT SCIATICA: ICD-10-CM

## 2019-03-13 DIAGNOSIS — G89.29 CHRONIC PAIN OF RIGHT KNEE: ICD-10-CM

## 2019-03-13 DIAGNOSIS — G89.29 CHRONIC BILATERAL LOW BACK PAIN WITHOUT SCIATICA: ICD-10-CM

## 2019-03-13 PROCEDURE — 73562 X-RAY EXAM OF KNEE 3: CPT

## 2019-03-13 PROCEDURE — 73590 X-RAY EXAM OF LOWER LEG: CPT

## 2019-03-13 PROCEDURE — 72114 X-RAY EXAM L-S SPINE BENDING: CPT

## 2019-03-15 ENCOUNTER — TELEPHONE (OUTPATIENT)
Dept: FAMILY MEDICINE CLINIC | Facility: CLINIC | Age: 39
End: 2019-03-15

## 2019-03-21 ENCOUNTER — TELEPHONE (OUTPATIENT)
Dept: FAMILY MEDICINE CLINIC | Facility: CLINIC | Age: 39
End: 2019-03-21

## 2019-03-21 DIAGNOSIS — G89.29 CHRONIC PAIN OF RIGHT KNEE: Primary | ICD-10-CM

## 2019-03-21 DIAGNOSIS — M25.561 CHRONIC PAIN OF RIGHT KNEE: Primary | ICD-10-CM

## 2019-03-21 NOTE — TELEPHONE ENCOUNTER
Please contact patient  I received results of his x-rays  Stable arthritic changes in his back  No unusual findings in his lower leg x-ray  Knee x-ray reveals arthritic changes and postop changes after ACL repair  Reportedly, hardware is intact  I would like patient to schedule consultation with [facility]'s Orthopedic surgery, Dr uRdy Caceres  to discuss chronic knee pain and its effect on his chronic low back pain as well as possibility of leg length discrepancy as we have discussed  I would like him to continue on muscle relaxant on a daily basis if it is helpful  We can follow up after evaluation by Orthopedic surgery  I will place orders    Thank you

## 2019-04-01 ENCOUNTER — CONSULT (OUTPATIENT)
Dept: OBGYN CLINIC | Facility: MEDICAL CENTER | Age: 39
End: 2019-04-01
Payer: COMMERCIAL

## 2019-04-01 VITALS
SYSTOLIC BLOOD PRESSURE: 131 MMHG | WEIGHT: 211 LBS | HEIGHT: 73 IN | DIASTOLIC BLOOD PRESSURE: 77 MMHG | BODY MASS INDEX: 27.96 KG/M2 | HEART RATE: 54 BPM

## 2019-04-01 DIAGNOSIS — Z98.890 S/P ACL RECONSTRUCTION: Primary | ICD-10-CM

## 2019-04-01 DIAGNOSIS — M25.561 CHRONIC PAIN OF RIGHT KNEE: ICD-10-CM

## 2019-04-01 DIAGNOSIS — G89.29 CHRONIC PAIN OF RIGHT KNEE: ICD-10-CM

## 2019-04-01 PROCEDURE — 99243 OFF/OP CNSLTJ NEW/EST LOW 30: CPT | Performed by: ORTHOPAEDIC SURGERY

## 2019-06-20 ENCOUNTER — OFFICE VISIT (OUTPATIENT)
Dept: PAIN MEDICINE | Facility: CLINIC | Age: 39
End: 2019-06-20
Payer: COMMERCIAL

## 2019-06-20 VITALS
SYSTOLIC BLOOD PRESSURE: 138 MMHG | DIASTOLIC BLOOD PRESSURE: 84 MMHG | WEIGHT: 211 LBS | HEIGHT: 73 IN | BODY MASS INDEX: 27.96 KG/M2 | RESPIRATION RATE: 18 BRPM

## 2019-06-20 DIAGNOSIS — M47.816 LUMBAR SPONDYLOSIS: ICD-10-CM

## 2019-06-20 DIAGNOSIS — M54.50 CHRONIC BILATERAL LOW BACK PAIN WITHOUT SCIATICA: ICD-10-CM

## 2019-06-20 DIAGNOSIS — G89.29 CHRONIC BILATERAL LOW BACK PAIN WITHOUT SCIATICA: ICD-10-CM

## 2019-06-20 DIAGNOSIS — M79.18 MYOFASCIAL PAIN SYNDROME: Primary | ICD-10-CM

## 2019-06-20 PROCEDURE — 99213 OFFICE O/P EST LOW 20 MIN: CPT | Performed by: NURSE PRACTITIONER

## 2019-06-27 ENCOUNTER — EVALUATION (OUTPATIENT)
Dept: PHYSICAL THERAPY | Facility: REHABILITATION | Age: 39
End: 2019-06-27
Payer: COMMERCIAL

## 2019-06-27 DIAGNOSIS — M79.18 MYOFASCIAL PAIN SYNDROME: ICD-10-CM

## 2019-06-27 PROCEDURE — 97161 PT EVAL LOW COMPLEX 20 MIN: CPT | Performed by: PHYSICAL THERAPIST

## 2019-07-02 ENCOUNTER — OFFICE VISIT (OUTPATIENT)
Dept: PHYSICAL THERAPY | Facility: REHABILITATION | Age: 39
End: 2019-07-02
Payer: COMMERCIAL

## 2019-07-02 DIAGNOSIS — M79.18 MYOFASCIAL PAIN SYNDROME: Primary | ICD-10-CM

## 2019-07-02 PROCEDURE — 97112 NEUROMUSCULAR REEDUCATION: CPT | Performed by: PHYSICAL THERAPIST

## 2019-07-02 PROCEDURE — 97110 THERAPEUTIC EXERCISES: CPT | Performed by: PHYSICAL THERAPIST

## 2019-07-02 PROCEDURE — 97530 THERAPEUTIC ACTIVITIES: CPT | Performed by: PHYSICAL THERAPIST

## 2019-07-02 NOTE — PROGRESS NOTES
Daily Note     Today's date: 2019  Patient name: Soha Mock  : 1980  MRN: [id number]  Referring provider: FAB Mattson  Dx:   Encounter Diagnosis     ICD-10-CM    1  Myofascial pain syndrome M79 18        Start Time: 1535  Stop Time: 1625  Total time in clinic (min): 50 minutes    Subjective:  Pt notes compliance with the current HEP  He is frustrated with the chronicity of the sx's  (2 years)    Objective: See treatment diary below  HEP updated with prone TA with active knee bends, side planks, q-ped pelvic rocking, and seated hip hinge with dowel  Assessment: Tolerated treatment well  Slight improvements noted with active control of the pelvis during q-ped pelvic rocking with VC's  Patient exhibited good technique with therapeutic exercises      Plan: Continue per plan of care        Precautions: standard      Manual             Prone quad stretch (pillow under stomach)  LMR                                                                   Exercise Diary             hooklying TA 5"x5 5"x5, 15"x5           Hep instruction LMR LMR           Pt education LMR            VG - b/l   3x10 - L7           Prone TA with active knee bend/straighten  12           Side plank - knee flexed  2x5 b/l           q-ped pelvic rocking  15                                                                                                                                                                                        Modalities

## 2019-07-05 ENCOUNTER — OFFICE VISIT (OUTPATIENT)
Dept: PHYSICAL THERAPY | Facility: REHABILITATION | Age: 39
End: 2019-07-05
Payer: COMMERCIAL

## 2019-07-05 DIAGNOSIS — M79.18 MYOFASCIAL PAIN SYNDROME: Primary | ICD-10-CM

## 2019-07-05 PROCEDURE — 97140 MANUAL THERAPY 1/> REGIONS: CPT | Performed by: PHYSICAL THERAPIST

## 2019-07-05 PROCEDURE — 97112 NEUROMUSCULAR REEDUCATION: CPT | Performed by: PHYSICAL THERAPIST

## 2019-07-05 NOTE — PROGRESS NOTES
Daily Note     Today's date: 2019  Patient name: Kathy Gage  : 1980  MRN: [id number]  Referring provider: FAB Menezes  Dx:   Encounter Diagnosis     ICD-10-CM    1  Myofascial pain syndrome M79 18        Start Time: 1515  Stop Time: 1600  Total time in clinic (min): 45 minutes    Subjective:  Pt notes that exercises have been beneficial when he does them in the morning before work  Can feel discomfort more in his back if he does not do exercises earlier in day  Saw chiropractor prior to PT session  Objective: See treatment diary below         Assessment: Tolerated treatment well  Seems to be improving with motor control when in prone with knee bending  With cuing able to properly engage TA and keep LS from extending as he increased knee flexion angle  Plan: Continue per plan of care        Precautions: standard      Manual   7/3          Prone quad stretch (pillow under stomach)  LMR AB             5'          Assessment   5'                                        Exercise Diary   7/5          hooklying TA 5"x5 5"x5, 15"x5           Hep instruction LMR LMR           Pt education LMR            VG - b/l   3x10 - L7 3x10  - L7  BTB ext          Prone TA with active knee bend/straighten  12 2x12 ea side          Side plank - knee flexed  2x5 b/l 2x5 b/l          q-ped pelvic rocking  15           HHR w/ hip hinge focus    15x                                                                                                                                                                            Modalities

## 2019-07-08 ENCOUNTER — OFFICE VISIT (OUTPATIENT)
Dept: PHYSICAL THERAPY | Facility: REHABILITATION | Age: 39
End: 2019-07-08
Payer: COMMERCIAL

## 2019-07-08 DIAGNOSIS — M79.18 MYOFASCIAL PAIN SYNDROME: Primary | ICD-10-CM

## 2019-07-08 PROCEDURE — 97140 MANUAL THERAPY 1/> REGIONS: CPT | Performed by: PHYSICAL THERAPIST

## 2019-07-08 PROCEDURE — 97110 THERAPEUTIC EXERCISES: CPT | Performed by: PHYSICAL THERAPIST

## 2019-07-08 PROCEDURE — 97112 NEUROMUSCULAR REEDUCATION: CPT | Performed by: PHYSICAL THERAPIST

## 2019-07-08 NOTE — PROGRESS NOTES
Daily Note     Today's date: 2019  Patient name: Jordan Edward  : 1980  MRN: [id number]  Referring provider: FAB Main  Dx:   Encounter Diagnosis     ICD-10-CM    1  Myofascial pain syndrome M79 18        Start Time: 1520  Stop Time: 1605  Total time in clinic (min): 45 minutes    Subjective: No major changes in his pain  )"It still hurts ") However, he feels he is moving a bit easier and continues to note compliance with the current HEP  Objective: See treatment diary below  HEP updated with q-ped TA with alt UE reach  Assessment: Tolerated treatment well  Patient demonstrated fatigue post treatment      Plan: Continue per plan of care        Precautions: standard      Manual  06/27 07/02 7/3 07/08         Prone quad stretch (pillow under stomach)  LMR AB LMR            5'          Assessment   5'          S/l L/S tissue deformation    LMR                          Exercise Diary  08         hooklying TA 5"x5 5"x5, 15"x5           Hep instruction LMR LMR  LMR         Pt education LMR            VG - b/l   3x10 - L7 3x10  - L7  BTB ext 3x11 - L7          Prone TA with active knee bend/straighten  12 2x12 ea side 2x15         Side plank - knee flexed  2x5 b/l 2x5 b/l 3x5         q-ped pelvic rocking  15           HHR w/ hip hinge focus    15x            q-ped with alt UE reach    3x5         q-ped with alt LE kick    2x5                                                                                                                                               Modalities

## 2019-07-11 ENCOUNTER — OFFICE VISIT (OUTPATIENT)
Dept: PHYSICAL THERAPY | Facility: REHABILITATION | Age: 39
End: 2019-07-11
Payer: COMMERCIAL

## 2019-07-11 DIAGNOSIS — M79.18 MYOFASCIAL PAIN SYNDROME: Primary | ICD-10-CM

## 2019-07-11 PROCEDURE — 97140 MANUAL THERAPY 1/> REGIONS: CPT | Performed by: PHYSICAL THERAPIST

## 2019-07-11 PROCEDURE — 97110 THERAPEUTIC EXERCISES: CPT | Performed by: PHYSICAL THERAPIST

## 2019-07-11 NOTE — PROGRESS NOTES
Daily Note     Today's date: 2019  Patient name: Carly Mckeon  : 1980  MRN: [id number]  Referring provider: FAB Mccann  Dx:   Encounter Diagnosis     ICD-10-CM    1  Myofascial pain syndrome M79 18        Start Time: 1535  Stop Time: 1630  Total time in clinic (min): 55 minutes    Subjective:  Pt had to lift and move a few pieces of furniture around his house the other day  He didn't feel LBP during this, but c/o increased discomfort afterwards  Objective: See treatment diary below  HEP updated with standing functional core recruitment while pushing into the wall  Assessment: Tolerated treatment well  Patient exhibited good technique with therapeutic exercises      Plan: Continue per plan of care        Precautions: standard      Manual  06/27 07/02 7/3 07/08 07/11        Prone quad stretch (pillow under stomach)  LMR AB LMR            5'          Assessment   5'          S/l L/S tissue deformation    LMR         Supine 90/90 hs stretch     LMR            Exercise Diary   7        hooklying TA 5"x5 5"x5, 15"x5           Hep instruction LMR LMR  LMR LMR        Pt education LMR    LMR        VG - b/l   3x10 - L7 3x10  - L7  BTB ext 3x11 - L7          Prone TA with active knee bend/straighten  12 2x12 ea side 2x15         Side plank - knee flexed  2x5 b/l 2x5 b/l 3x5 2x5        q-ped pelvic rocking  15           HHR w/ hip hinge focus    15x            q-ped with alt UE reach    3x5         q-ped with alt LE kick    2x5         Child's pose (3 pos)     1' x 3        Standing - multif press     Blue - 2x25        Tandem stance - alt dowel      3 rounds                                                                                                       Modalities

## 2019-07-15 ENCOUNTER — OFFICE VISIT (OUTPATIENT)
Dept: PHYSICAL THERAPY | Facility: REHABILITATION | Age: 39
End: 2019-07-15
Payer: COMMERCIAL

## 2019-07-15 DIAGNOSIS — M79.18 MYOFASCIAL PAIN SYNDROME: Primary | ICD-10-CM

## 2019-07-15 PROCEDURE — 97140 MANUAL THERAPY 1/> REGIONS: CPT | Performed by: PHYSICAL THERAPIST

## 2019-07-15 PROCEDURE — 97110 THERAPEUTIC EXERCISES: CPT | Performed by: PHYSICAL THERAPIST

## 2019-07-15 PROCEDURE — 97112 NEUROMUSCULAR REEDUCATION: CPT | Performed by: PHYSICAL THERAPIST

## 2019-07-15 NOTE — PROGRESS NOTES
Daily Note     Today's date: 7/15/2019  Patient name: Fausto Daniels  : 1980  MRN: [id number]  Referring provider: FAB Vidal  Dx:   Encounter Diagnosis     ICD-10-CM    1  Myofascial pain syndrome M79 18        Start Time: 1650  Stop Time: 1745  Total time in clinic (min): 55 minutes    Subjective: Pt cut the grass about an hour before he came to PT  He was conscious about keeping his core active during this activity  "It helped, but I still have a long way to go "    Objective: See treatment diary below  Assessment: Tolerated treatment well  Patient demonstrated fatigue post treatment      Plan: Continue per plan of care        Precautions: standard      Manual   7/3 07/08 07/11 07/15       Prone quad stretch (pillow under stomach)  LMR AB LMR            5'          Assessment   5'          [place] - prone - L/S      LMR       S/l L/S tissue deformation    LMR         Supine 90/90 hs stretch     LMR            Exercise Diary   7/5 07/08 07/11 07/15       hooklying TA 5"x5 5"x5, 15"x5           Hep instruction LMR LMR  LMR LMR        Pt education LMR    LMR        VG - b/l   3x10 - L7 3x10  - L7  BTB ext 3x11 - L7   L7 - 10'       Prone TA with active knee bend/straighten  12 2x12 ea side 2x15         Side plank - knee flexed  2x5 b/l 2x5 b/l 3x5 2x5 3x5       q-ped pelvic rocking  15           HHR w/ hip hinge focus    15x            q-ped with alt UE reach    3x5  3x5       q-ped with alt LE kick    2x5         Child's pose (3 pos)     1' x 3 1'x3       Standing - multif press     Blue - 2x25        Tandem stance - alt dowel      3 rounds        Stand - hip hinge - body blade      B/l - 2', u/l - 30" (3 rounds)                                                                                         Modalities

## 2019-07-18 ENCOUNTER — OFFICE VISIT (OUTPATIENT)
Dept: PAIN MEDICINE | Facility: CLINIC | Age: 39
End: 2019-07-18
Payer: COMMERCIAL

## 2019-07-18 ENCOUNTER — OFFICE VISIT (OUTPATIENT)
Dept: PHYSICAL THERAPY | Facility: REHABILITATION | Age: 39
End: 2019-07-18
Payer: COMMERCIAL

## 2019-07-18 VITALS
BODY MASS INDEX: 27.96 KG/M2 | SYSTOLIC BLOOD PRESSURE: 112 MMHG | HEART RATE: 60 BPM | WEIGHT: 211 LBS | RESPIRATION RATE: 18 BRPM | DIASTOLIC BLOOD PRESSURE: 84 MMHG | HEIGHT: 73 IN

## 2019-07-18 DIAGNOSIS — M79.18 MYOFASCIAL PAIN SYNDROME: Primary | ICD-10-CM

## 2019-07-18 DIAGNOSIS — M47.816 LUMBAR SPONDYLOSIS: ICD-10-CM

## 2019-07-18 PROCEDURE — 97110 THERAPEUTIC EXERCISES: CPT | Performed by: PHYSICAL THERAPIST

## 2019-07-18 PROCEDURE — 99213 OFFICE O/P EST LOW 20 MIN: CPT | Performed by: NURSE PRACTITIONER

## 2019-07-18 PROCEDURE — 97140 MANUAL THERAPY 1/> REGIONS: CPT | Performed by: PHYSICAL THERAPIST

## 2019-07-18 PROCEDURE — 97112 NEUROMUSCULAR REEDUCATION: CPT | Performed by: PHYSICAL THERAPIST

## 2019-07-18 RX ORDER — METHOCARBAMOL 500 MG/1
TABLET, FILM COATED ORAL
Qty: 30 TABLET | Refills: 0 | Status: SHIPPED | OUTPATIENT
Start: 2019-07-18 | End: 2019-08-20 | Stop reason: SDUPTHER

## 2019-07-18 NOTE — PROGRESS NOTES
Pt c/o lower back pain    Assessment:  1  Myofascial pain syndrome    2  Lumbar spondylosis        Plan:  Mary Arrieta is a 46 y o  male with a history of lumbar spondylosis and myofascial pain syndrome  He continues with ongoing low back pain, which is localized to his low back  He is reporting that physical therapy is helping  However, he continues with back pain in the evening after working and working around the house when he is off from work  The patient has completed 4 weeks, which she reports is helping  I instructed the patient to call the office in 2 weeks to give a further update on the effectiveness of physical therapy  I discussed with the patient that if he would no longer continue to have improvement in symptoms or his symptoms worsen I would order an MRI of his lumbar spine at that time  He verbalized understanding  I did discuss with the patient about possibly obtaining x-rays of his lumbar spine  However the patient reports that he recently had x-rays and he does not feel that symptoms have changed since his last x-rays  He reports that he would not like to proceed with any additional x-rays at this time  His last x-ray did show facet arthropathy  I did discuss with the patient about possibly proceeding with bilateral L3-L5 medial branch blocks in the future if he continues with ongoing low back pain  However, I discussed with him about obtaining an updated MRI first   He verbalized understanding  The patient reports that tizanidine 4 mg at bedtime is no longer helping with his muscle spasms  Therefore at this time I would discontinue tizanidine start the patient on methocarbamol 500 mg at bedtime  He was educated on the medications most common side effects which include dizziness, drowsiness  He was instructed not to drive or operate heavy machinery while taking this medication    He was instructed to call the office in 2 weeks with an update on the effectiveness of this medication, or sooner with adverse medication side effects  The patient will follow up in 4 weeks or sooner with the worsening of symptoms  My impressions and treatment recommendations were discussed in detail with the patient who verbalized understanding and had no further questions  Discharge instructions were provided  I personally saw and examined the patient and I agree with the above discussed plan of care  No orders of the defined types were placed in this encounter  New Medications Ordered This Visit   Medications    methocarbamol (ROBAXIN) 500 mg tablet     Sig: Take 1 tablet at bedtime as needed for muscle spasms     Dispense:  30 tablet     Refill:  0       History of Present Illness:  Trista Young is a 46 y o  male with a history of lumbar spondylosis and myofascial pain syndrome  The patient was last seen office on 6/20/2019 where he was referred to physical therapy for evaluation and treatment  He presents for a follow up office visit in regards to Back Pain (lower back)  The patients current symptoms include low back pain that is localized to his low back  He describes his pain as dull aching intermittent pain that is worsened during the evening hours  The patient reports that his pain is symptoms are slightly improved since last office visit  He currently rates his pain a 4/10 numeric pain scale  Current pain medications includes:  Tizanidine 4 mg at bedtime   The patient reports that this regimen is providing 40% pain relief  The patient is reporting no side effects from this pain medication regimen  I have personally reviewed and/or updated the patient's past medical history, past surgical history, family history, social history, current medications, allergies, and vital signs today  Review of Systems   Respiratory: Negative for shortness of breath  Cardiovascular: Negative for chest pain     Gastrointestinal: Negative for constipation, diarrhea, nausea and vomiting  Musculoskeletal: Negative for arthralgias, gait problem, joint swelling and myalgias  Joint stiffness   Skin: Negative for rash  Neurological: Negative for dizziness, seizures and weakness  All other systems reviewed and are negative  Patient Active Problem List   Diagnosis    Impacted cerumen of left ear    Low back pain    Mixed emotional features as adjustment reaction    Allergic rhinitis due to pollen    Anxiety    Chronic prostatitis    Gastroesophageal reflux disease without esophagitis    Hyperlipidemia    Irritable bowel syndrome with diarrhea    Total bilirubin, elevated    Dysfunction of left eustachian tube    Physical exam    Refused influenza vaccine    Blepharoconjunctivitis of left eye       History reviewed  No pertinent past medical history  History reviewed  No pertinent surgical history      Family History   Problem Relation Age of Onset    Melanoma Mother        Social History     Occupational History    Not on file   Tobacco Use    Smoking status: Never Smoker    Smokeless tobacco: Never Used   Substance and Sexual Activity    Alcohol use: No     Comment: Occasional use per [employer]     Drug use: No    Sexual activity: Not on file       Current Outpatient Medications on File Prior to Visit   Medication Sig    cetirizine (ZyrTEC) 10 mg tablet Take 1 tablet by mouth as needed      fexofenadine (ALLEGRA) 180 MG tablet Take 180 mg by mouth    fluticasone (FLONASE) 50 mcg/act nasal spray 1-2 sprays into each nostril as needed      ibuprofen (MOTRIN) 400 mg tablet Take 400 mg by mouth as needed for mild pain      Lido-Menthol-Methyl Sal-Camph (CBD KINGS EX) Apply topically    naproxen sodium (ALEVE) 220 MG tablet Take 220 mg by mouth as needed      TURMERIC PO Take by mouth    [DISCONTINUED] tiZANidine (ZANAFLEX) 4 mg tablet Take 1 tablet (4 mg total) by mouth daily at bedtime     No current facility-administered medications on file prior to visit  No Known Allergies    Physical Exam:    /84 (BP Location: Right arm, Patient Position: Sitting, Cuff Size: Standard)   Pulse 60   Resp 18   Ht 6' 1" (1 854 m)   Wt 95 7 kg (211 lb)   BMI 27 84 kg/m²     Constitutional:normal, well developed, well nourished, alert, in no distress and non-toxic and no overt pain behavior  and overweight  Eyes:anicteric  HEENT:grossly intact  Neck:supple, symmetric, trachea midline and no masses   Pulmonary:even and unlabored  Cardiovascular:No edema or pitting edema present  Skin:Normal without rashes or lesions and well hydrated  Psychiatric:Mood and affect appropriate  Neurologic:Cranial Nerves II-XII grossly intact  Musculoskeletal:normal     Imaging  LUMBAR SPINE     INDICATION:   M54 5: Low back pain  G89 29: Other chronic pain      COMPARISON:  Plain radiographs August 3, 2016 and MRI May 25, 2017      VIEWS:  XR SPINE LUMBAR COMPLETE W BENDING MINIMUM 6 VIEWS  Images: 7     FINDINGS:  There is no evidence of acute fracture or destructive osseous lesion      Alignment is unremarkable      Transition vertebra at the lumbosacral junction with incomplete sacralization of L5 on S1  Mild disc space narrowing throughout the lumbar spine, most pronounced L4-L5    Facet hypertrophic changes      The pedicles appear intact       No evidence of lumbar instability      Soft tissues are unremarkable      IMPRESSION:  Stable degenerative changes

## 2019-07-18 NOTE — PROGRESS NOTES
Daily Note     Today's date: 2019  Patient name: Ghislaine Mott  : 1980  MRN: [id number]  Referring provider: FAB Quijano  Dx:   Encounter Diagnosis     ICD-10-CM    1  Myofascial pain syndrome M79 18        Start Time: 1715  Stop Time: 1800  Total time in clinic (min): 45 minutes    Subjective: Pt f/u with [facility] earlier today  They would like him to continue PT for two more weeks and then f/u with them via phone with his progress  Probable MRI will be ordered  Discussed "burning the nerves"  Objective: See treatment diary below  HEP updated with planks and PHE on p-ball  Assessment: Tolerated treatment well  Patient demonstrated fatigue post treatment      Plan: Continue per plan of care        Precautions: standard      Manual   7/3 07/08 07/11 07/15 07/18      Prone quad stretch (pillow under stomach)  LMR AB LMR            5'          Assessment   5'          Graston - prone - L/S      LMR LMR      S/l L/S tissue deformation    LMR         Supine 90/90 hs stretch     LMR            Exercise Diary   7/5 07/08 07/11 07/15 07/18      hooklying TA 5"x5 5"x5, 15"x5           Hep instruction LMR LMR  LMR LMR        Pt education LMR    LMR        VG - b/l   3x10 - L7 3x10  - L7  BTB ext 3x11 - L7   L7 - 10' With MHP L7 - 10' - orange      Prone TA with active knee bend/straighten  12 2x12 ea side 2x15         Side plank - knee flexed  2x5 b/l 2x5 b/l 3x5 2x5 3x5       q-ped pelvic rocking  15           HHR w/ hip hinge focus    15x            q-ped with alt UE reach    3x5  3x5       q-ped with alt LE kick    2x5   3x5      Child's pose (3 pos)     1' x 3 1'x3       Standing - multif press     Blue - 2x25        Tandem stance - alt dowel      3 rounds        Stand - hip hinge - body blade      B/l - 2', u/l - 30" (3 rounds) B/l - 2', u/l - 30"      Plank on p-ball       20"      PHE on p-ball       2x15 Modalities

## 2019-07-22 ENCOUNTER — OFFICE VISIT (OUTPATIENT)
Dept: PHYSICAL THERAPY | Facility: REHABILITATION | Age: 39
End: 2019-07-22
Payer: COMMERCIAL

## 2019-07-22 DIAGNOSIS — M79.18 MYOFASCIAL PAIN SYNDROME: Primary | ICD-10-CM

## 2019-07-22 PROCEDURE — 97110 THERAPEUTIC EXERCISES: CPT | Performed by: PHYSICAL THERAPIST

## 2019-07-22 PROCEDURE — 97140 MANUAL THERAPY 1/> REGIONS: CPT | Performed by: PHYSICAL THERAPIST

## 2019-07-22 PROCEDURE — 97112 NEUROMUSCULAR REEDUCATION: CPT | Performed by: PHYSICAL THERAPIST

## 2019-07-22 NOTE — PROGRESS NOTES
Daily Note     Today's date: 2019  Patient name: Lopez Mehta  : 1980  MRN: [id number]  Referring provider: FAB Colon  Dx:   Encounter Diagnosis     ICD-10-CM    1  Myofascial pain syndrome M79 18        Start Time: 1700  Stop Time: 1745  Total time in clinic (min): 45 minutes    Subjective: "It's hard to get to sleep "  Pt ordered a 65 cm physioball  Objective: See treatment diary below  FOTO = 56  Assessment: Tolerated treatment well  Patient demonstrated fatigue post treatment      Plan: Continue per plan of care        Precautions: standard      Manual   7/3 07/08 07/11 07/15 07/18 07/22     Prone quad stretch (pillow under stomach)  LMR AB LMR    LMR     Prone hip flexor stretch        B/l - LMR     Supine hip prom        B/l - LMR                                               5'          Assessment   5'          Graston - prone - L/S      LMR LMR      S/l L/S tissue deformation    LMR         Supine 90/90 hs stretch     LMR            Exercise Diary   7/5 07/08 07/11 07/15 07/18 07/22     hooklying TA 5"x5 5"x5, 15"x5           Hep instruction LMR LMR  LMR LMR        Pt education LMR    LMR        VG - b/l   3x10 - L7 3x10  - L7  BTB ext 3x11 - L7   L7 - 10' With MHP L7 - 10' - orange      Prone TA with active knee bend/straighten  12 2x12 ea side 2x15         Side plank - knee flexed  2x5 b/l 2x5 b/l 3x5 2x5 3x5  x5     q-ped pelvic rocking  15           HHR w/ hip hinge focus    15x            q-ped with alt UE reach    3x5  3x5       q-ped with alt LE kick    2x5   3x5      Child's pose (3 pos)     1' x 3 1'x3       Standing - multif press     Blue - 2x25   Blue - 2x15     Tandem stance - alt dowel      3 rounds        Stand - hip hinge - body blade      B/l - 2', u/l - 30" (3 rounds) B/l - 2', u/l - 30"      Plank on p-ball       20" 15" x 5     PHE on p-ball       2x15 3x15     Bird dog row        15# - 3x5 Modalities

## 2019-07-25 ENCOUNTER — OFFICE VISIT (OUTPATIENT)
Dept: PHYSICAL THERAPY | Facility: REHABILITATION | Age: 39
End: 2019-07-25
Payer: COMMERCIAL

## 2019-07-25 DIAGNOSIS — M79.18 MYOFASCIAL PAIN SYNDROME: Primary | ICD-10-CM

## 2019-07-25 PROCEDURE — 97110 THERAPEUTIC EXERCISES: CPT | Performed by: PHYSICAL THERAPIST

## 2019-07-25 PROCEDURE — 97140 MANUAL THERAPY 1/> REGIONS: CPT | Performed by: PHYSICAL THERAPIST

## 2019-07-25 PROCEDURE — 97112 NEUROMUSCULAR REEDUCATION: CPT | Performed by: PHYSICAL THERAPIST

## 2019-07-25 NOTE — PROGRESS NOTES
Daily Note     Today's date: 2019  Patient name: Carly Mckeon  : 1980  MRN: [id number]  Referring provider: FAB Mccann  Dx:   Encounter Diagnosis     ICD-10-CM    1  Myofascial pain syndrome M79 18        Start Time: 1630  Stop Time: 1730  Total time in clinic (min): 60 minutes    Subjective: Pt c/o "a lot of pain" this week for unknown reason  Objective: See treatment diary below  Assessment: Tolerated treatment well  Patient exhibited good technique with therapeutic exercises    Plan: Continue per plan of care    Re-eval on 19     Precautions: standard      Manual   7/3 07/08 07/11 07/15 07/18 07/22 07/25    Prone quad stretch (pillow under stomach)  LMR AB LMR    LMR     Prone hip flexor stretch        B/l - LMR     Supine hip prom        B/l - LMR                                               5'          Assessment   5'          Graston - prone - L/S      LMR LMR      S/l L/S tissue deformation    LMR     LMR    Supine 90/90 hs stretch     LMR            Exercise Diary   7/5 07/08 07/11 07/15 07/18 07/22 07/25    hooklying TA 5"x5 5"x5, 15"x5           Hep instruction LMR LMR  LMR LMR        Pt education LMR    LMR        VG - b/l   3x10 - L7 3x10  - L7  BTB ext 3x11 - L7   L7 - 10' With MHP L7 - 10' - orange  L7 - orange - 10'    Prone TA with active knee bend/straighten  12 2x12 ea side 2x15         Side plank - knee flexed  2x5 b/l 2x5 b/l 3x5 2x5 3x5  x5 3# - bottoms up - 3x5    q-ped pelvic rocking  15           HHR w/ hip hinge focus    15x            q-ped with alt UE reach    3x5  3x5       q-ped with alt LE kick    2x5   3x5      Child's pose (3 pos)     1' x 3 1'x3       Standing - multif press     Blue - 2x25   Blue - 2x15     Tandem stance - alt dowel      3 rounds        Stand - hip hinge - body blade      B/l - 2', u/l - 30" (3 rounds) B/l - 2', u/l - 30"      Plank on p-ball       20" 15" x 5     PHE on p-ball       2x15 3x15 Bird dog row        15# - 3x5 7# - 2x15    Supine deadbug         15# - 5'    Goblet squats         nv    lunges         nv    Lifting mechanics         nv                                               Modalities

## 2019-07-30 ENCOUNTER — OFFICE VISIT (OUTPATIENT)
Dept: PHYSICAL THERAPY | Facility: REHABILITATION | Age: 39
End: 2019-07-30
Payer: COMMERCIAL

## 2019-07-30 ENCOUNTER — TELEPHONE (OUTPATIENT)
Dept: PAIN MEDICINE | Facility: MEDICAL CENTER | Age: 39
End: 2019-07-30

## 2019-07-30 DIAGNOSIS — G89.29 CHRONIC BILATERAL LOW BACK PAIN WITHOUT SCIATICA: Primary | ICD-10-CM

## 2019-07-30 DIAGNOSIS — M54.50 CHRONIC BILATERAL LOW BACK PAIN WITHOUT SCIATICA: Primary | ICD-10-CM

## 2019-07-30 DIAGNOSIS — M79.18 MYOFASCIAL PAIN SYNDROME: Primary | ICD-10-CM

## 2019-07-30 PROCEDURE — 97110 THERAPEUTIC EXERCISES: CPT

## 2019-07-30 PROCEDURE — 97112 NEUROMUSCULAR REEDUCATION: CPT

## 2019-07-30 PROCEDURE — 97140 MANUAL THERAPY 1/> REGIONS: CPT

## 2019-07-30 NOTE — PROGRESS NOTES
Daily Note     Today's date: 2019  Patient name: Jaymie Barraza  : 1980  MRN: [id number]  Referring provider: FAB Martin  Dx:   Encounter Diagnosis     ICD-10-CM    1  Myofascial pain syndrome M79 18        Start Time: 1700  Stop Time: 1758  Total time in clinic (min): 58 minutes    Subjective: Pt reports he is not sure how much therapy is helping any more  "It's not getting worse, but it's not getting better either "  Notes he spoke with his referring physician over the phone, who is scheduling an MRI  Objective: See treatment diary below      Assessment: Tolerated treatment well  Was able to perform most exercise with no significant increase in symptoms  Tolerated new exercises today as noted below  Was able to perform good lifting mechanics from floor to waist with frequent verbal/tactile cueing  Patient would benefit from continued PT to further improve core strength and decrease stress on LB  Plan: Continue per plan of care        Precautions: standard      Manual  06/27 07/02 7/3 07/08 07/11 07/15 07/18 07/22 07/25 7/30   Prone quad stretch (pillow under stomach)  LMR AB LMR    LMR     Prone hip flexor stretch        B/l - LMR     Supine hip prom        B/l - LMR                                               5'          Assessment   5'          Graston - prone - L/S      LMR LMR      S/l L/S tissue deformation    LMR     LMR AFB   Supine 90/90 hs stretch     LMR            Exercise Diary   7/5 07/08 07/11 07/15 07/18 07/22 07/25 7/30   hooklying TA 5"x5 5"x5, 15"x5           Hep instruction LMR LMR  LMR LMR        Pt education LMR    LMR        VG - b/l   3x10 - L7 3x10  - L7  BTB ext 3x11 - L7   L7 - 10' With MHP L7 - 10' - orange  L7 - orange - 10' L7 - orange - 10   Prone TA with active knee bend/straighten  12 2x12 ea side 2x15         Side plank - knee flexed  2x5 b/l 2x5 b/l 3x5 2x5 3x5  x5 3# - bottoms up - 3x5 3# - bottoms up - 3x5   q-ped pelvic rocking  15           HHR w/ hip hinge focus    15x            q-ped with alt UE reach    3x5  3x5       q-ped with alt LE kick    2x5   3x5      Child's pose (3 pos)     1' x 3 1'x3       Standing - multif press     Blue - 2x25   Blue - 2x15     Tandem stance - alt dowel      3 rounds        Stand - hip hinge - body blade      B/l - 2', u/l - 30" (3 rounds) B/l - 2', u/l - 30"      Plank on p-ball       20" 15" x 5     PHE on p-ball       2x15 3x15     Bird dog row        15# - 3x5 7# - 2x15 7# - 2x15   Supine deadbug         15# - 5' 15# - 5'   Goblet squats         nv 5# KB  10x   lunges         nv nv   Lifting mechanics         nv 10x  Wood  box                                              Modalities

## 2019-07-30 NOTE — TELEPHONE ENCOUNTER
SW patient, he will be using [address] [place] [address] MRI facility  Central Scheduling phone number given [phone]  Patient is going to call to schedule MRI

## 2019-07-30 NOTE — TELEPHONE ENCOUNTER
S/w pt, he said he has completed 9 PT sessions, is going to his 10th tonight and he has not had any improvement in his symptoms  Pt said the muscle relaxer helps him sleep somewhat but the pain is still bad during the day  Pt said it feels like a tight ache in his joints  Pt asked if he is a candidate for MRI at this time?      **pt said ok to leave detailed MOM if he does not answer

## 2019-07-30 NOTE — TELEPHONE ENCOUNTER
Pt states that PT and medication regiem has not changed anything in how the pt feel  Pt states that his pain level 5-6/10  He working or doing physical activity  Pt states that he does not have any s/e   From medication       Pt can be reached at [phone]

## 2019-08-08 ENCOUNTER — OFFICE VISIT (OUTPATIENT)
Dept: PHYSICAL THERAPY | Facility: REHABILITATION | Age: 39
End: 2019-08-08
Payer: COMMERCIAL

## 2019-08-08 DIAGNOSIS — M79.18 MYOFASCIAL PAIN SYNDROME: Primary | ICD-10-CM

## 2019-08-08 PROCEDURE — 97110 THERAPEUTIC EXERCISES: CPT

## 2019-08-08 NOTE — PROGRESS NOTES
Daily Note     Today's date: 2019  Patient name: Kushal Campos  : 1980  MRN: [id number]  Referring provider: FAB Funes  Dx:   Encounter Diagnosis     ICD-10-CM    1  Myofascial pain syndrome M79 18                   Subjective: Pt reports having pain today  His job aggravates his pain  MRI scheduled for   Objective: See treatment diary below      Assessment: Tolerated treatment well  Was able to perform most exercise with no significant increase in symptoms  Some popping in hip reported with supine dead bug  Plan: Continue per plan of care        Precautions: standard      Manual   73 07/08 07/11 07/15 07/18 07/22 07/25 7/30   Prone quad stretch (pillow under stomach)  LMR AB LMR    LMR     Prone hip flexor stretch        B/l - LMR     Supine hip prom        B/l - LMR                                               5'          Assessment   5'          Graston - prone - L/S      LMR LMR      S/l L/S tissue deformation    LMR     LMR AFB   Supine 90/90 hs stretch     LMR            Exercise Diary   75 07/08 07/11 07/15 07/18 07/22 07/25 7/30   hooklying TA  5"x5, 15"x5           Hep instruction  LMR  LMR LMR        Pt education     LMR        VG - b/l  L7 orange- 10 min 3x10 - L7 3x10  - L7  BTB ext 3x11 - L7   L7 - 10' With MHP L7 - 10' - orange  L7 - orange - 10' L7 - orange - 10   Prone TA with active knee bend/straighten  12 2x12 ea side 2x15         Side plank - knee flexed 3# - bottom s up- 3x5 2x5 b/l 2x5 b/l 3x5 2x5 3x5  x5 3# - bottoms up - 3x5 3# - bottoms up - 3x5   q-ped pelvic rocking  15           HHR w/ hip hinge focus    15x            q-ped with alt UE reach 3x5   3x5  3x5       q-ped with alt LE kick 3x5   2x5   3x5      Child's pose (3 pos)     1' x 3 1'x3       Standing - multif press     Blue - 2x25   Blue - 2x15     Tandem stance - alt dowel      3 rounds        Stand - hip hinge - body blade      B/l - 2', u/l - 30" (3 rounds) B/l - 2', u/l - 30"      Plank on p-ball 15" x5      20" 15" x 5     PHE on p-ball 3x15      2x15 3x15     Bird dog row 7# 3x5       15# - 3x5 7# - 2x15 7# - 2x15   Supine deadbug 15#- 5'        15# - 5' 15# - 5'   Goblet squats         nv 5# KB  10x   lunges         nv nv   Lifting mechanics         nv 10x  Wood  box                                              Modalities

## 2019-08-11 ENCOUNTER — HOSPITAL ENCOUNTER (OUTPATIENT)
Dept: MRI IMAGING | Facility: HOSPITAL | Age: 39
Discharge: HOME | End: 2019-08-11
Payer: COMMERCIAL

## 2019-08-11 DIAGNOSIS — G89.29 CHRONIC BILATERAL LOW BACK PAIN WITHOUT SCIATICA: ICD-10-CM

## 2019-08-11 DIAGNOSIS — M54.50 CHRONIC BILATERAL LOW BACK PAIN WITHOUT SCIATICA: ICD-10-CM

## 2019-08-11 PROCEDURE — 72148 MRI LUMBAR SPINE W/O DYE: CPT

## 2019-08-12 ENCOUNTER — OFFICE VISIT (OUTPATIENT)
Dept: PHYSICAL THERAPY | Facility: REHABILITATION | Age: 39
End: 2019-08-12
Payer: COMMERCIAL

## 2019-08-12 DIAGNOSIS — M79.18 MYOFASCIAL PAIN SYNDROME: Primary | ICD-10-CM

## 2019-08-12 PROCEDURE — 97110 THERAPEUTIC EXERCISES: CPT | Performed by: PHYSICAL THERAPIST

## 2019-08-12 PROCEDURE — 97112 NEUROMUSCULAR REEDUCATION: CPT | Performed by: PHYSICAL THERAPIST

## 2019-08-12 NOTE — PROGRESS NOTES
Daily Note     Today's date: 2019  Patient name: Arina Rivera  : 1980  MRN: [id number]  Referring provider: FAB Mei  Dx:   Encounter Diagnosis     ICD-10-CM    1  Myofascial pain syndrome M79 18        Start Time: 1630  Stop Time: 1715  Total time in clinic (min): 45 minutes    Subjective: Pt had a MRI yesterday of his lumbar region  He should have the results by the end of the week  Objective: See treatment diary below  FOTO increased from 53 to 72 indicating improvement  Assessment: Tolerated treatment well  Patient exhibited good technique with therapeutic exercises      Plan: Hold PT  Pt will call in one month  Potential d/c if no flare-ups        Precautions: standard      Manual   7/3 07/08 07/11 07/15 07/18 07/22 07/25 7/30   Prone quad stretch (pillow under stomach)  LMR AB LMR    LMR     Prone hip flexor stretch        B/l - LMR     Supine hip prom        B/l - LMR                                               5'          Assessment   5'          Graston - prone - L/S      LMR LMR      S/l L/S tissue deformation    LMR     LMR AFB   Supine 90/90 hs stretch     LMR            Exercise Diary   75 07/08 07/11 07/15 07/18 07/22 07/25 7/30   hooklying TA  5"x5, 15"x5           Hep instruction  LMR  LMR LMR        Pt education     LMR        VG - b/l  L7 orange- 10 min 3x10 - L7 3x10  - L7  BTB ext 3x11 - L7   L7 - 10' With MHP L7 - 10' - orange  L7 - orange - 10' L7 - orange - 10   Prone TA with active knee bend/straighten  12 2x12 ea side 2x15         Side plank - knee flexed 3# - bottom s up- 3x5 2x5 b/l 2x5 b/l 3x5 2x5 3x5  x5 3# - bottoms up - 3x5 3# - bottoms up - 3x5   q-ped pelvic rocking  15           HHR w/ hip hinge focus    15x            q-ped with alt UE reach 3x5   3x5  3x5       q-ped with alt LE kick 3x5   2x5   3x5      Child's pose (3 pos)     1' x 3 1'x3       Standing - multif press     Blue - 2x25   Blue - 2x15     Tandem stance - alt dowel      3 rounds        Stand - hip hinge - body blade      B/l - 2', u/l - 30" (3 rounds) B/l - 2', u/l - 30"      Plank on p-ball 15" x5      20" 15" x 5     PHE on p-ball 3x15      2x15 3x15     Bird dog row 7# 3x5       15# - 3x5 7# - 2x15 7# - 2x15   Supine deadbug 15#- 5'        15# - 5' 15# - 5'   Goblet squats         nv 5# KB  10x   lunges         nv nv   Lifting mechanics         nv 10x  Wood  box                                              Modalities                                                                Manual  08/12            Supine sktc B/l - LMR                                                                    Exercise Diary  08/12            VG with b/l shoulder ext L7 - orange - 10'            Bird dog row 10# - 3x9            deadbug with dowel stab 5# x 3            Side planks 3x5                                                                                                                                                                                                                                Modalities

## 2019-08-15 ENCOUNTER — APPOINTMENT (OUTPATIENT)
Dept: PHYSICAL THERAPY | Facility: REHABILITATION | Age: 39
End: 2019-08-15
Payer: COMMERCIAL

## 2019-08-16 ENCOUNTER — TELEPHONE (OUTPATIENT)
Dept: PAIN MEDICINE | Facility: CLINIC | Age: 39
End: 2019-08-16

## 2019-08-16 DIAGNOSIS — M79.18 MYOFASCIAL PAIN SYNDROME: ICD-10-CM

## 2019-08-16 NOTE — TELEPHONE ENCOUNTER
Please let the patient know that there is only a slight progression in degenerative changes within his lumbar spine at L5-S1 as well as a stable circumferential disc bulge  Please let him know that the previous central protrusion at this level is no longer present and he has some mild bilateral foraminal stenosis at L5-S1 as well  The radiologist stated that this study is not significantly changed when compared to the prior study that was completed in 2017

## 2019-08-19 NOTE — TELEPHONE ENCOUNTER
FQ please refill methocarbamol  S/w pt and advised the same  Pt verbalized understanding  Pt would like a refill of methocarbamol  Pt states it is working well for him  Pt would like to know treatment plan going forward  Per ov note, possible MBB in the future  Advised DE OLIVEIRA out of office this week but SPA will reach out next week with update

## 2019-08-20 ENCOUNTER — APPOINTMENT (OUTPATIENT)
Dept: PHYSICAL THERAPY | Facility: REHABILITATION | Age: 39
End: 2019-08-20
Payer: COMMERCIAL

## 2019-08-20 RX ORDER — METHOCARBAMOL 500 MG/1
TABLET, FILM COATED ORAL
Qty: 30 TABLET | Refills: 2 | Status: SHIPPED | OUTPATIENT
Start: 2019-08-20 | End: 2019-11-07 | Stop reason: SDUPTHER

## 2019-08-20 NOTE — TELEPHONE ENCOUNTER
S/w pt, informed him of below  Reviewed in detail the MBB and RFA procedures  Pt is requesting that DE OLIVEIRA give him a call when she has time next week to discuss further, before making decision  Pt aware DE OLIVEIRA is out of office this week

## 2019-08-21 DIAGNOSIS — M47.816 LUMBAR SPONDYLOSIS: Primary | ICD-10-CM

## 2019-08-21 NOTE — TELEPHONE ENCOUNTER
Pt called stating that he would like the order for the MBB placed in his chart to schedule     Pt can be reached at [phone]

## 2019-08-22 ENCOUNTER — APPOINTMENT (OUTPATIENT)
Dept: PHYSICAL THERAPY | Facility: REHABILITATION | Age: 39
End: 2019-08-22
Payer: COMMERCIAL

## 2019-08-30 NOTE — TELEPHONE ENCOUNTER
I s/w pt and informed him that DE OLIVEIRA is out of the office today, our office is closed Monday for the Holiday but DE OLIVEIRA will be in the office on 9/3/19  I told pt I will forward a message to HA to call pt Tues 9/3 when she returns  Pt understood and just asks that DE OLIVEIRA call him before his procedure that is scheduled for 9/17/19

## 2019-08-30 NOTE — TELEPHONE ENCOUNTER
S/w patient states he has "been waiting for a call back from [facility], was told she was on vacation and would call him when she returned"  Pt would like to speak w/HA regarding upcoming procedure since she has been his treating provider      Pt only wants to speak to [facility] # [phone]

## 2019-09-03 NOTE — TELEPHONE ENCOUNTER
I called the patient and discussed the procedure with him  He reports at he would like to proceed with the procedure

## 2019-09-17 ENCOUNTER — HOSPITAL ENCOUNTER (OUTPATIENT)
Dept: RADIOLOGY | Facility: CLINIC | Age: 39
Discharge: HOME | End: 2019-09-17
Attending: ANESTHESIOLOGY
Payer: COMMERCIAL

## 2019-09-17 VITALS
DIASTOLIC BLOOD PRESSURE: 76 MMHG | RESPIRATION RATE: 20 BRPM | SYSTOLIC BLOOD PRESSURE: 125 MMHG | OXYGEN SATURATION: 98 % | TEMPERATURE: 98.5 F | HEART RATE: 63 BPM

## 2019-09-17 DIAGNOSIS — M47.816 LUMBAR SPONDYLOSIS: ICD-10-CM

## 2019-09-17 PROCEDURE — 64494 INJ PARAVERT F JNT L/S 2 LEV: CPT | Performed by: ANESTHESIOLOGY

## 2019-09-17 PROCEDURE — 64493 INJ PARAVERT F JNT L/S 1 LEV: CPT | Performed by: ANESTHESIOLOGY

## 2019-09-17 RX ORDER — BUPIVACAINE HCL/PF 2.5 MG/ML
10 VIAL (ML) INJECTION ONCE
Status: COMPLETED | OUTPATIENT
Start: 2019-09-17 | End: 2019-09-17

## 2019-09-17 RX ADMIN — BUPIVACAINE HYDROCHLORIDE 3 ML: 2.5 INJECTION, SOLUTION EPIDURAL; INFILTRATION; INTRACAUDAL at 15:33

## 2019-09-17 NOTE — H&P
History of Present Illness: The patient is a 46 y o  male who presents with complaints of lower back pain secondary lumbar spondylosis and is here today for bilateral L3-5 medial branch blocks  Patient Active Problem List   Diagnosis    Impacted cerumen of left ear    Low back pain    Mixed emotional features as adjustment reaction    Allergic rhinitis due to pollen    Anxiety    Chronic prostatitis    Gastroesophageal reflux disease without esophagitis    Hyperlipidemia    Irritable bowel syndrome with diarrhea    Total bilirubin, elevated    Dysfunction of left eustachian tube    Physical exam    Refused influenza vaccine    Blepharoconjunctivitis of left eye       No past medical history on file  No past surgical history on file        Current Outpatient Medications:     cetirizine (ZyrTEC) 10 mg tablet, Take 1 tablet by mouth as needed  , Disp: , Rfl:     fluticasone (FLONASE) 50 mcg/act nasal spray, 1-2 sprays into each nostril as needed  , Disp: , Rfl:     ibuprofen (MOTRIN) 400 mg tablet, Take 400 mg by mouth as needed for mild pain  , Disp: , Rfl:     Lido-Menthol-Methyl Sal-Camph (CBD KINGS EX), Apply topically, Disp: , Rfl:     methocarbamol (ROBAXIN) 500 mg tablet, Take 1 tablet at bedtime as needed for muscle spasms, Disp: 30 tablet, Rfl: 2    naproxen sodium (ALEVE) 220 MG tablet, Take 220 mg by mouth as needed  , Disp: , Rfl:     TURMERIC PO, Take by mouth, Disp: , Rfl:     Current Facility-Administered Medications:     bupivacaine (PF) (MARCAINE) 0 25 % injection 10 mL, 10 mL, Perineural, Once, Wily Allen MD    Allergies   Allergen Reactions    Latex Other (See Comments)     redness       Physical Exam:   Vitals:    09/17/19 1515   BP: 145/74   Pulse: 65   Resp: 20   Temp: 98 5 °F (36 9 °C)   SpO2: 98%     General: Awake, Alert, Oriented x 3, Mood and affect appropriate  Respiratory: Respirations even and unlabored  Cardiovascular: Peripheral pulses intact; no edema  Musculoskeletal Exam:   Lower back tenderness    ASA Score: 1    Patient/Chart Verification  Patient ID Verified: Verbal  Consents Confirmed: To be obtained in the Pre-Procedure area  H&P( within 30 days) Verified: To be obtained in the Pre-Procedure area  Allergies Reviewed: Yes  Anticoag/NSAID held?: NA  Currently on antibiotics?: No    Assessment:   1   Lumbar spondylosis        Plan: Bilateral L3-5 MBB

## 2019-09-17 NOTE — DISCHARGE INSTR - LAB

## 2019-10-07 ENCOUNTER — OFFICE VISIT (OUTPATIENT)
Dept: URGENT CARE | Facility: CLINIC | Age: 39
End: 2019-10-07
Payer: COMMERCIAL

## 2019-10-07 ENCOUNTER — APPOINTMENT (OUTPATIENT)
Dept: RADIOLOGY | Facility: CLINIC | Age: 39
End: 2019-10-07
Payer: COMMERCIAL

## 2019-10-07 VITALS
DIASTOLIC BLOOD PRESSURE: 87 MMHG | OXYGEN SATURATION: 99 % | BODY MASS INDEX: 26.51 KG/M2 | WEIGHT: 200 LBS | HEIGHT: 73 IN | HEART RATE: 57 BPM | RESPIRATION RATE: 19 BRPM | SYSTOLIC BLOOD PRESSURE: 122 MMHG | TEMPERATURE: 98.8 F

## 2019-10-07 DIAGNOSIS — R07.89 CHEST TIGHTNESS: ICD-10-CM

## 2019-10-07 DIAGNOSIS — M62.830 SPASM OF THORACIC BACK MUSCLE: Primary | ICD-10-CM

## 2019-10-07 PROCEDURE — 99213 OFFICE O/P EST LOW 20 MIN: CPT | Performed by: PHYSICIAN ASSISTANT

## 2019-10-07 PROCEDURE — 71046 X-RAY EXAM CHEST 2 VIEWS: CPT

## 2019-10-07 RX ORDER — ACETAMINOPHEN 325 MG/1
975 TABLET ORAL ONCE
Status: COMPLETED | OUTPATIENT
Start: 2019-10-07 | End: 2019-10-07

## 2019-10-07 RX ORDER — METHOCARBAMOL 500 MG/1
500 TABLET, FILM COATED ORAL 3 TIMES DAILY PRN
Qty: 20 TABLET | Refills: 0 | Status: SHIPPED | OUTPATIENT
Start: 2019-10-07 | End: 2019-12-19

## 2019-10-07 RX ADMIN — ACETAMINOPHEN 975 MG: 325 TABLET ORAL at 14:35

## 2019-10-07 NOTE — LETTER
October 7, 2019     Patient: Sammy Canada   YOB: 1980   Date of Visit: 10/7/2019       To Whom it May Concern:    Sammy Canada was seen in my clinic on 10/7/2019  He may return to work on 10/09/2019  If you have any questions or concerns, please don't hesitate to call           Sincerely,          Britney Nichols PA-C

## 2019-10-07 NOTE — PATIENT INSTRUCTIONS
Muscle relaxers can be sedating  You should not drive or operate equipment while taking a muscle relaxer  Make sure you have at least 8 hours of sleep when you take a muscle relaxer so that you do not wake up groggy  Take ibuprofen 3 times a day (every 8 hours) with food to prevent stomach upset, nausea or vomiting  Can take Tylenol as needed in between doses of ibuprofen for pain relief  Can use over-the-counter creams and ointments like Nathanael-Small or icy Hot  Can also use lidocaine patches (Aspercreme, Salonpas or IcyHot Lidocaine 4%)  Do not use heat over the lidocaine patches or over the creams or ointments to prevent skin irritation  If symptoms are not improving follow up with family doctor or Orthopedics  Muscle Spasm   WHAT YOU NEED TO KNOW:   A muscle spasm is a sudden contraction of any muscle or group of muscles  A muscle cramp is a painful muscle spasm  Muscle cramps commonly occur after intense exercise or during pregnancy  They may also be caused by certain medications, dehydration, low calcium or magnesium levels, or another medical condition  DISCHARGE INSTRUCTIONS:   Medicines: You may need the following:  · NSAIDs  help decrease swelling and pain or fever  This medicine is available with or without a doctor's order  NSAIDs can cause stomach bleeding or kidney problems in certain people  If you take blood thinner medicine, always ask your healthcare provider if NSAIDs are safe for you  Always read the medicine label and follow directions  · Take your medicine as directed  Contact your healthcare provider if you think your medicine is not helping or if you have side effects  Tell him of her if you are allergic to any medicine  Keep a list of the medicines, vitamins, and herbs you take  Include the amounts, and when and why you take them  Bring the list or the pill bottles to follow-up visits  Carry your medicine list with you in case of an emergency    Follow up with your healthcare provider as directed: You may need other tests or treatment  You may also be referred to a physical therapist or other specialist  Write down your questions so you remember to ask them during your visits  Self-care:   · Stretch  your muscle to help relieve the cramp  It may be helpful to keep your muscle in the stretched position until the cramp is gone  · Apply heat  to help decrease pain and muscle spasms  Apply heat on the area for 20 to 30 minutes every 2 hours for as many days as directed  · Apply ice  to help decrease swelling and pain  Ice may also help prevent tissue damage  Use an ice pack, or put crushed ice in a plastic bag  Cover it with a towel and place it on your muscle for 15 to 20 minutes every hour or as directed  · Drink more liquids  to help prevent muscle cramps caused by dehydration  Sports drinks may help replace electrolytes you lose through sweat during exercise  Ask your healthcare provider how much liquid to drink each day and which liquids are best for you  · Eat healthy foods , such as fruits, vegetables, whole grains, low-fat dairy products, and lean proteins (meat, beans, and fish)  If you are pregnant, ask your healthcare provider about foods that are high in magnesium and sodium  They may help to relieve cramps during pregnancy  · Massage your muscle  to help relieve the cramp  · Take frequent deep breaths  until the cramp feels better  Lie down while you take the deep breaths so you do not get dizzy or lightheaded  Contact your healthcare provider if:   · You have signs of dehydration, such as a headache, dark yellow urine, dry eyes or mouth, or a fast heartbeat  · You have questions or concerns about your condition or care  Return to the emergency department if:   · You have warmth, swelling, or redness in the cramping muscle  · You have frequent or unrelieved muscle cramps in several different muscles       · You have muscle cramps with numbness, tingling, and burning in your hands and feet  © 2017 [address] Information is for End User's use only and may not be sold, redistributed or otherwise used for commercial purposes  All illustrations and images included in CareNotes® are the copyrighted property of A D A M , Inc  or [employer]  The above information is an  only  It is not intended as medical advice for individual conditions or treatments  Talk to your doctor, nurse or pharmacist before following any medical regimen to see if it is safe and effective for you

## 2019-10-07 NOTE — PROGRESS NOTES
Assessment/Plan    Spasm of thoracic back muscle [M62 830]  1  Spasm of thoracic back muscle  acetaminophen (TYLENOL) tablet 975 mg    methocarbamol (ROBAXIN) 500 mg tablet   2  Chest tightness  XR chest pa & lateral         Subjective:     Patient ID: Jay Proctor is a 46 y o  male  Reason For Visit / Chief Complaint  Chief Complaint   Patient presents with    Back Pain     Mid/upper back - Describs the pain as getting the wind knocked out of you, super tight and uncomfortable to sit  52-year-old male presents the clinic with mid/upper back pain that started earlier in the week  Patient states that he is uncomfortable when sitting and states that the area feels very tight with moving  Patient denies any neck pain or low back pain  Patient denies any midline tenderness, numbness and tingling to extremities, saddle back anesthesia, loss of bowel or bladder, swelling to lower extremities, changes in gait or falling  Patient denies any known injury to the area and states that pain started gradually  Patient has tried some over-the-counter meds intermittently without improvement in symptoms  History reviewed  No pertinent past medical history  History reviewed  No pertinent surgical history  Family History   Problem Relation Age of Onset    Melanoma Mother        Review of Systems   Constitutional: Negative for chills and fever  Respiratory: Negative for chest tightness, shortness of breath and wheezing  Cardiovascular: Negative for chest pain and palpitations  Gastrointestinal: Negative for abdominal pain, constipation, diarrhea, nausea and vomiting  Musculoskeletal: Positive for back pain  Negative for arthralgias, gait problem, neck pain and neck stiffness  Skin: Negative for rash  Neurological: Negative for dizziness, weakness, light-headedness, numbness and headaches  All other systems reviewed and are negative        Objective:    /87   Pulse 57   Temp 98 8 °F (37 1 °C) (Tympanic)   Resp 19   Ht 6' 1" (1 854 m)   Wt 90 7 kg (200 lb)   SpO2 99%   BMI 26 39 kg/m²     Physical Exam   Constitutional: He is oriented to person, place, and time  Vital signs are normal  He appears well-developed and well-nourished  No distress  HENT:   Head: Normocephalic and atraumatic  Cardiovascular: Intact distal pulses  Pulmonary/Chest: Effort normal    Musculoskeletal: Normal range of motion  Thoracic back: He exhibits tenderness and spasm  He exhibits normal range of motion, no bony tenderness, no swelling, no edema, no deformity, no laceration and normal pulse  No midline tenderness to cervical, thoracic, lumbar spine, TTP to left-sided lower thoracic back with pain reproducible with rotation of torso toward the right side and with flexion of torso  Muscle spasms noted to paraspinal muscles on left side  Neurological: He is alert and oriented to person, place, and time  Skin: Skin is warm and dry  Capillary refill takes less than 2 seconds  He is not diaphoretic  Nursing note and vitals reviewed

## 2019-10-08 ENCOUNTER — OFFICE VISIT (OUTPATIENT)
Dept: FAMILY MEDICINE CLINIC | Facility: CLINIC | Age: 39
End: 2019-10-08
Payer: COMMERCIAL

## 2019-10-08 VITALS
SYSTOLIC BLOOD PRESSURE: 110 MMHG | OXYGEN SATURATION: 98 % | DIASTOLIC BLOOD PRESSURE: 60 MMHG | RESPIRATION RATE: 16 BRPM | TEMPERATURE: 98.1 F | BODY MASS INDEX: 27.77 KG/M2 | HEIGHT: 73 IN | WEIGHT: 209.5 LBS | HEART RATE: 63 BPM

## 2019-10-08 DIAGNOSIS — M54.6 ACUTE RIGHT-SIDED THORACIC BACK PAIN: Primary | ICD-10-CM

## 2019-10-08 DIAGNOSIS — M47.816 LUMBAR SPONDYLOSIS: ICD-10-CM

## 2019-10-08 LAB
SL AMB  POCT GLUCOSE, UA: NORMAL
SL AMB LEUKOCYTE ESTERASE,UA: NORMAL
SL AMB POCT BILIRUBIN,UA: NORMAL
SL AMB POCT BLOOD,UA: NORMAL
SL AMB POCT CLARITY,UA: CLEAR
SL AMB POCT COLOR,UA: YELLOW
SL AMB POCT KETONES,UA: NORMAL
SL AMB POCT NITRITE,UA: NORMAL
SL AMB POCT PH,UA: 7.5
SL AMB POCT SPECIFIC GRAVITY,UA: 1
SL AMB POCT URINE PROTEIN: NORMAL
SL AMB POCT UROBILINOGEN: 0.2

## 2019-10-08 PROCEDURE — 3008F BODY MASS INDEX DOCD: CPT | Performed by: FAMILY MEDICINE

## 2019-10-08 PROCEDURE — 81003 URINALYSIS AUTO W/O SCOPE: CPT | Performed by: FAMILY MEDICINE

## 2019-10-08 PROCEDURE — 99214 OFFICE O/P EST MOD 30 MIN: CPT | Performed by: FAMILY MEDICINE

## 2019-10-08 RX ORDER — DIAZEPAM 5 MG/1
5 TABLET ORAL EVERY 8 HOURS PRN
Qty: 30 TABLET | Refills: 0 | Status: SHIPPED | OUTPATIENT
Start: 2019-10-08 | End: 2020-01-06

## 2019-10-08 RX ORDER — KETOROLAC TROMETHAMINE 30 MG/ML
30 INJECTION, SOLUTION INTRAMUSCULAR; INTRAVENOUS ONCE
Status: COMPLETED | OUTPATIENT
Start: 2019-10-08 | End: 2019-10-08

## 2019-10-08 RX ORDER — DICLOFENAC SODIUM 75 MG/1
TABLET, DELAYED RELEASE ORAL
Qty: 60 TABLET | Refills: 0 | Status: SHIPPED | OUTPATIENT
Start: 2019-10-08 | End: 2019-11-18 | Stop reason: SDUPTHER

## 2019-10-08 RX ADMIN — KETOROLAC TROMETHAMINE 30 MG: 30 INJECTION, SOLUTION INTRAMUSCULAR; INTRAVENOUS at 16:52

## 2019-10-08 NOTE — PROGRESS NOTES
FAMILY PRACTICE OFFICE VISIT       NAME: Gianni Geller  AGE: 46 y o  SEX: male       : 1980        MRN: [id number]        Assessment and Plan     Problem List Items Addressed This Visit        Musculoskeletal and Integument    Lumbar spondylosis      Other Visit Diagnoses     Acute right-sided thoracic back pain    -  Primary    Relevant Medications    diazepam (VALIUM) 5 mg tablet    diclofenac (VOLTAREN) 75 mg EC tablet    ketorolac (TORADOL) injection 30 mg (Completed)    Other Relevant Orders    POCT urine dip auto non-scope (Completed)       Patient presents for evaluation of right-sided thoracic back pain, strain, his symptoms developed within past 2 days after doing stretching exercises for chronic low back pain  Patient has been using regimen of ibuprofen, Robaxin and Tylenol without significant improvement  Reproducible tenderness/spasm paraspinal thoracic spine and right trapezius area  His vital signs including pulse ox are stable  Patient denies symptoms of dyspnea, chest pain, palpitations, dysuria  Chest x-ray performed at urgent care yesterday was normal   Urinalysis is clear  Plan:  1  Patient will discontinue ibuprofen, Robaxin and Tylenol due to lack of efficacy  2  Injection of Toradol 30 mg IM was administered today, patient will start on outpatient regimen of diclofenac 75 mg b i d  After meals, 1st dose of diclofenac to be administered in 6 hours  3  Will try patient on Valium 5 mg t i d  P r n     I explained patient and his wife that medication may cause drowsiness, patient should not be driving while on this medication  4  I strongly advised patient and wife to contact me within 24 hours if he is not experiencing improvement in his symptoms  I discussed plan of treatment with patient in the office and his wife over the phone  Both are agreeable with plan and understand instructions  Chronic low back pain    Patient is scheduled for radiofrequency ablation of L3-L5 with [facility]  He did notice significant improvement of low back pain after median branch block performed in September  I believe he should be proceeding with procedure long-term, will coordinate best timing for this procedure with [facility] pending current symptoms  Patient Instructions   · Please discontinue ibuprofen and Robaxin  · Please do not take Tylenol  · Please start diclofenac/Voltaren, 75 mg twice a day after food as needed for pain, it is anti-inflammatory, do not take until 10:30 p m  Today  · Please start Valium 5 mg every 8 hours as needed for painful spasm, it is muscle relaxant      Discussed with the patient and all questioned fully answered  He will call me if any problems arise  M*[employer] software was used to dictate this note  It may contain errors with dictating incorrect words/spelling  Please contact provider directly with any questions  Chief Complaint     Chief Complaint   Patient presents with    Back Pain     x 2 day   otc advil        History of Present Illness     Chronic low back pain  Recent evaluation by [facility] on September 17th for L3-5 medial branch blocks    Patient has been using ibuprofen 600 mg in the morning and Robaxin at bedtime within past few months to alleviate chronic low back pain and has noticed some improvement  Patient also noticed significant improvement after medial branch block and is scheduled for radiofrequency ablation later this month  He is here today for evaluation of new right side/midback pain  His discomfort started yesterday morning  Patient was doing his regular stretching exercises for low back pain and has noticed that his mid back went "OFF", he developed painful muscle spasm within 10 minutes of his exercise/stretching routine  Patientwas seen at urgent care center on October 7th  Chest x-ray was negative    Patient was advised to continue with regimen of ibuprofen, Robaxin and p r n  Tylenol  His symptoms  unfortunately are not improving and he is here today for re-evaluation  Patient is complaining of right-sided thoracic pain at the area of shoulder blade, closer to his T-spine  He is uncomfortable due to recurrent spasms  He denies symptoms of chest pain, palpitations, shortness of breath, dizziness, numbness, tingling or paresthesias  His discomfort is not radiating  He is very uncomfortable and feels better with laying on the right side  Pain is worse after prolonged sitting, patient feels better with stretching  No dysuria or flank pain, no fever  Back Pain   This is a new problem  The current episode started yesterday  The problem occurs constantly  The problem is unchanged  The pain is present in the thoracic spine  The pain is at a severity of 10/10  The pain is severe  The pain is the same all the time  The symptoms are aggravated by lying down, twisting and standing  Pertinent negatives include no abdominal pain, bladder incontinence, bowel incontinence, chest pain, dysuria, fever, leg pain, numbness, paresthesias, tingling or weakness  He has tried analgesics, bed rest, muscle relaxant and NSAIDs for the symptoms  The treatment provided no relief  Review of Systems   Review of Systems   Constitutional: Positive for fatigue  Negative for fever  Cardiovascular: Negative  Negative for chest pain, palpitations and leg swelling  Gastrointestinal: Negative  Negative for abdominal pain, blood in stool, bowel incontinence, constipation and diarrhea  Endocrine: Negative  Genitourinary: Negative for bladder incontinence, dysuria, flank pain, frequency and hematuria  Musculoskeletal: Positive for back pain and myalgias  Skin: Negative  Neurological: Negative  Negative for tingling, weakness, numbness and paresthesias  Psychiatric/Behavioral: The patient is nervous/anxious          Active Problem List     Patient Active Problem List   Diagnosis  Impacted cerumen of left ear    Low back pain    Mixed emotional features as adjustment reaction    Allergic rhinitis due to pollen    Anxiety    Chronic prostatitis    Gastroesophageal reflux disease without esophagitis    Hyperlipidemia    Irritable bowel syndrome with diarrhea    Total bilirubin, elevated    Dysfunction of left eustachian tube    Physical exam    Refused influenza vaccine    Blepharoconjunctivitis of left eye    Lumbar spondylosis       Past Medical History:  History reviewed  No pertinent past medical history  Past Surgical History:  History reviewed  No pertinent surgical history      Family History:  Family History   Problem Relation Age of Onset   Jarrod Bones Melanoma Mother        Social History:  Social History     Socioeconomic History    Marital status: /Civil Union     Spouse name: Not on file    Number of children: Not on file    Years of education: Not on file    Highest education level: Not on file   Occupational History    Not on file   Social Needs    Financial resource strain: Not on file    Food insecurity:     Worry: Not on file     Inability: Not on file    Transportation needs:     Medical: Not on file     Non-medical: Not on file   Tobacco Use    Smoking status: Never Smoker    Smokeless tobacco: Never Used   Substance and Sexual Activity    Alcohol use: No     Comment: Occasional use per [employer]     Drug use: No    Sexual activity: Not on file   Lifestyle    Physical activity:     Days per week: Not on file     Minutes per session: Not on file    Stress: Not on file   Relationships    Social connections:     Talks on phone: Not on file     Gets together: Not on file     Attends Protestant service: Not on file     Active member of club or organization: Not on file     Attends meetings of clubs or organizations: Not on file     Relationship status: Not on file    Intimate partner violence:     Fear of current or ex partner: Not on file Emotionally abused: Not on file     Physically abused: Not on file     Forced sexual activity: Not on file   Other Topics Concern    Not on file   Social History Narrative    Not on file     Objective     Vitals:    10/08/19 1539   BP: 110/60   BP Location: Left arm   Patient Position: Lying right side   Cuff Size: Large   Pulse: 63   Resp: 16   Temp: 98 1 °F (36 7 °C)   TempSrc: Tympanic   SpO2: 98%   Weight: 95 kg (209 lb 8 oz)   Height: 6' 1" (1 854 m)     Wt Readings from Last 3 Encounters:   10/08/19 95 kg (209 lb 8 oz)   10/07/19 90 7 kg (200 lb)   07/18/19 95 7 kg (211 lb)       Physical Exam   Constitutional: He is oriented to person, place, and time  He appears well-developed and well-nourished  Uncomfortable due to pain  Difficulty staying in one position due to painful muscle spasms   HENT:   Head: Normocephalic and atraumatic  Eyes: Conjunctivae are normal    Neck: Neck supple  Carotid bruit is not present  Cardiovascular: Normal rate, regular rhythm and normal heart sounds  No murmur heard  Pulmonary/Chest: Effort normal and breath sounds normal  No respiratory distress  He has no wheezes  He has no rales  Abdominal: Soft  Normal appearance and bowel sounds are normal  He exhibits no distension and no abdominal bruit  There is no tenderness  There is no rigidity, no guarding and no CVA tenderness  Musculoskeletal: He exhibits no edema  Thoracic back: He exhibits decreased range of motion, tenderness (Paraspinal tenderness T6-T10 to the right), pain and spasm (Paraspinal and right trapezius)  He exhibits no swelling, no edema and no deformity  Increased pain in right mid back with straightening and extension, symptoms are relieved with flexion   Neurological: He is alert and oriented to person, place, and time  No cranial nerve deficit     Reproducible tenderness/muscle spasm triggering pain   Psychiatric: His speech is normal and behavior is normal  Thought content normal  His mood appears anxious  Nursing note and vitals reviewed        Pertinent Laboratory/Diagnostic Studies:  Lab Results   Component Value Date    BUN 18 09/29/2018    CREATININE 1 00 09/29/2018    CALCIUM 9 1 09/29/2018    K 3 9 09/29/2018    CO2 32 09/29/2018     09/29/2018     Lab Results   Component Value Date    ALT 29 09/29/2018    AST 17 09/29/2018    ALKPHOS 58 09/29/2018       Lab Results   Component Value Date    WBC 4 98 09/29/2018    HGB 15 7 09/29/2018    HCT 48 1 09/29/2018    MCV 88 09/29/2018     09/29/2018       No results found for: TSH    No results found for: CHOL  Lab Results   Component Value Date    TRIG 48 11/11/2016     Lab Results   Component Value Date    HDL 44 11/11/2016     Lab Results   Component Value Date    LDLCALC 143 (H) 11/11/2016     No results found for: HGBA1C    Results for orders placed or performed in visit on 10/08/19   POCT urine dip auto non-scope   Result Value Ref Range     COLOR,UA yellow     CLARITY,UA clear     SPECIFIC GRAVITY,UA 1 005      PH,UA 7 5     LEUKOCYTE ESTERASE,UA -     NITRITE,UA -     GLUCOSE, UA -     KETONES,UA -     BILIRUBIN,UA -     BLOOD,UA -     POCT URINE PROTEIN -     SL AMB POCT UROBILINOGEN 0 2        Orders Placed This Encounter   Procedures    POCT urine dip auto non-scope       ALLERGIES:  Allergies   Allergen Reactions    Latex Other (See Comments)     redness       Current Medications     Current Outpatient Medications   Medication Sig Dispense Refill    cetirizine (ZyrTEC) 10 mg tablet Take 1 tablet by mouth as needed        fluticasone (FLONASE) 50 mcg/act nasal spray 1-2 sprays into each nostril as needed        ibuprofen (MOTRIN) 400 mg tablet Take 400 mg by mouth as needed for mild pain        Lido-Menthol-Methyl Sal-Camph (CBD KINGS EX) Apply topically      methocarbamol (ROBAXIN) 500 mg tablet Take 1 tablet at bedtime as needed for muscle spasms 30 tablet 2    methocarbamol (ROBAXIN) 500 mg tablet Take 1 tablet (500 mg total) by mouth 3 (three) times a day as needed for muscle spasms for up to 7 days Do not drive or operate equipment while taking this medication due to increased sedation 20 tablet 0    naproxen sodium (ALEVE) 220 MG tablet Take 220 mg by mouth as needed        TURMERIC PO Take by mouth      diazepam (VALIUM) 5 mg tablet Take 1 tablet (5 mg total) by mouth every 8 (eight) hours as needed for muscle spasms 30 tablet 0    diclofenac (VOLTAREN) 75 mg EC tablet Take 1 tablet twice a day after food as needed for back pain 60 tablet 0     No current facility-administered medications for this visit  There are no discontinued medications  Health Maintenance     Health Maintenance   Topic Date Due    BMI: Followup Plan  02/17/1999    DTaP,Tdap,and Td Vaccines (1 - Tdap) 02/17/2002    INFLUENZA VACCINE  07/01/2019    PT PLAN OF CARE  07/27/2019    Depression Screening PHQ  06/27/2020    BMI: Adult  10/08/2020    Pneumococcal Vaccine: 65+ Years (1 of 2 - PCV13) 02/17/2046    Pneumococcal Vaccine: Pediatrics (0 to 5 Years) and At-Risk Patients (6 to 59 Years)  Aged Out    HEPATITIS B VACCINES  Aged Out       There is no immunization history for the selected administration types on file for this patient      Joanna Arriaga MD

## 2019-10-08 NOTE — PATIENT INSTRUCTIONS
· Please discontinue ibuprofen and Robaxin  · Please do not take Tylenol  · Please start diclofenac/Voltaren, 75 mg twice a day after food as needed for pain, it is anti-inflammatory, do not take until 10:30 p m   Today  · Please start Valium 5 mg every 8 hours as needed for painful spasm, it is muscle relaxant

## 2019-10-09 ENCOUNTER — TELEPHONE (OUTPATIENT)
Dept: PAIN MEDICINE | Facility: MEDICAL CENTER | Age: 39
End: 2019-10-09

## 2019-10-09 NOTE — TELEPHONE ENCOUNTER
Pt called stating that he would like to  reschedule procedure        Pt can be reached at [phone]

## 2019-11-07 ENCOUNTER — HOSPITAL ENCOUNTER (OUTPATIENT)
Dept: RADIOLOGY | Facility: CLINIC | Age: 39
Discharge: HOME | End: 2019-11-07
Attending: ANESTHESIOLOGY | Admitting: ANESTHESIOLOGY
Payer: COMMERCIAL

## 2019-11-07 VITALS
HEART RATE: 61 BPM | RESPIRATION RATE: 20 BRPM | TEMPERATURE: 98.2 F | OXYGEN SATURATION: 96 % | DIASTOLIC BLOOD PRESSURE: 72 MMHG | SYSTOLIC BLOOD PRESSURE: 119 MMHG

## 2019-11-07 DIAGNOSIS — M47.816 SPONDYLOSIS OF LUMBAR REGION WITHOUT MYELOPATHY OR RADICULOPATHY: ICD-10-CM

## 2019-11-07 PROCEDURE — 64494 INJ PARAVERT F JNT L/S 2 LEV: CPT | Performed by: ANESTHESIOLOGY

## 2019-11-07 PROCEDURE — 64493 INJ PARAVERT F JNT L/S 1 LEV: CPT | Performed by: ANESTHESIOLOGY

## 2019-11-07 RX ORDER — BUPIVACAINE HYDROCHLORIDE 7.5 MG/ML
5 INJECTION, SOLUTION EPIDURAL; RETROBULBAR ONCE
Status: COMPLETED | OUTPATIENT
Start: 2019-11-07 | End: 2019-11-07

## 2019-11-07 RX ADMIN — BUPIVACAINE HYDROCHLORIDE 3 ML: 7.5 INJECTION, SOLUTION EPIDURAL; RETROBULBAR at 13:40

## 2019-11-07 NOTE — H&P
History of Present Illness: The patient is a 46 y o  male who presents with complaints of lower back pain secondary lumbar spondylosis and is here today for bilateral L3-5 confirmatory blocks  Patient Active Problem List   Diagnosis    Impacted cerumen of left ear    Low back pain    Mixed emotional features as adjustment reaction    Allergic rhinitis due to pollen    Anxiety    Chronic prostatitis    Gastroesophageal reflux disease without esophagitis    Hyperlipidemia    Irritable bowel syndrome with diarrhea    Total bilirubin, elevated    Dysfunction of left eustachian tube    Physical exam    Refused influenza vaccine    Blepharoconjunctivitis of left eye    Lumbar spondylosis       No past medical history on file  No past surgical history on file        Current Outpatient Medications:     Lido-Menthol-Methyl Sal-Camph (CBD KINGS EX), Apply topically, Disp: , Rfl:     cetirizine (ZyrTEC) 10 mg tablet, Take 1 tablet by mouth as needed  , Disp: , Rfl:     diazepam (VALIUM) 5 mg tablet, Take 1 tablet (5 mg total) by mouth every 8 (eight) hours as needed for muscle spasms, Disp: 30 tablet, Rfl: 0    diclofenac (VOLTAREN) 75 mg EC tablet, Take 1 tablet twice a day after food as needed for back pain, Disp: 60 tablet, Rfl: 0    fluticasone (FLONASE) 50 mcg/act nasal spray, 1-2 sprays into each nostril as needed  , Disp: , Rfl:     ibuprofen (MOTRIN) 400 mg tablet, Take 400 mg by mouth as needed for mild pain  , Disp: , Rfl:     methocarbamol (ROBAXIN) 500 mg tablet, Take 1 tablet (500 mg total) by mouth 3 (three) times a day as needed for muscle spasms for up to 7 days Do not drive or operate equipment while taking this medication due to increased sedation, Disp: 20 tablet, Rfl: 0    naproxen sodium (ALEVE) 220 MG tablet, Take 220 mg by mouth as needed  , Disp: , Rfl:     TURMERIC PO, Take by mouth, Disp: , Rfl:     Current Facility-Administered Medications:     bupivacaine (PF) (MARCAINE) 0 75 % injection 5 mL, 5 mL, Other, Once, Mitra Pinedo MD    Allergies   Allergen Reactions    Latex Other (See Comments)     redness       Physical Exam:   Vitals:    11/07/19 1314   BP: 120/77   Pulse: 62   Resp: 20   Temp: 98 2 °F (36 8 °C)   SpO2: 95%     General: Awake, Alert, Oriented x 3, Mood and affect appropriate  Respiratory: Respirations even and unlabored  Cardiovascular: Peripheral pulses intact; no edema  Musculoskeletal Exam:   Low back tenderness    ASA Score: 1    Patient/Chart Verification  Patient ID Verified: Verbal  Consents Confirmed: To be obtained in the Pre-Procedure area  H&P( within 30 days) Verified: To be obtained in the Pre-Procedure area  Allergies Reviewed: Yes  Anticoag/NSAID held?: NA  Currently on antibiotics?: No    Assessment:   1   Spondylosis of lumbar region without myelopathy or radiculopathy        Plan: B/L L3-L5 MBB, Confirmatory

## 2019-11-07 NOTE — DISCHARGE INSTR - LAB

## 2019-11-18 DIAGNOSIS — M54.6 ACUTE RIGHT-SIDED THORACIC BACK PAIN: ICD-10-CM

## 2019-11-18 RX ORDER — DICLOFENAC SODIUM 75 MG/1
TABLET, DELAYED RELEASE ORAL
Qty: 60 TABLET | Refills: 0 | Status: SHIPPED | OUTPATIENT
Start: 2019-11-18 | End: 2020-01-06

## 2019-12-14 DIAGNOSIS — M54.6 ACUTE RIGHT-SIDED THORACIC BACK PAIN: ICD-10-CM

## 2019-12-16 RX ORDER — DICLOFENAC SODIUM 75 MG/1
TABLET, DELAYED RELEASE ORAL
Qty: 60 TABLET | Refills: 0 | OUTPATIENT
Start: 2019-12-16

## 2019-12-19 ENCOUNTER — HOSPITAL ENCOUNTER (OUTPATIENT)
Dept: RADIOLOGY | Facility: CLINIC | Age: 39
Discharge: HOME | End: 2019-12-19
Attending: ANESTHESIOLOGY

## 2019-12-19 VITALS
RESPIRATION RATE: 20 BRPM | DIASTOLIC BLOOD PRESSURE: 78 MMHG | TEMPERATURE: 98.5 F | OXYGEN SATURATION: 97 % | HEART RATE: 87 BPM | SYSTOLIC BLOOD PRESSURE: 133 MMHG

## 2019-12-19 DIAGNOSIS — M47.816 SPONDYLOSIS OF LUMBAR REGION WITHOUT MYELOPATHY OR RADICULOPATHY: ICD-10-CM

## 2019-12-19 DIAGNOSIS — M79.18 MYOFASCIAL PAIN: Primary | ICD-10-CM

## 2019-12-19 RX ORDER — 0.9 % SODIUM CHLORIDE 0.9 %
10 VIAL (ML) INJECTION ONCE
Status: DISCONTINUED | OUTPATIENT
Start: 2019-12-19 | End: 2019-12-23 | Stop reason: HOSPADM

## 2019-12-19 RX ORDER — BUPIVACAINE HCL/PF 2.5 MG/ML
10 VIAL (ML) INJECTION ONCE
Status: DISCONTINUED | OUTPATIENT
Start: 2019-12-19 | End: 2019-12-23 | Stop reason: HOSPADM

## 2019-12-19 RX ORDER — METHOCARBAMOL 750 MG/1
750 TABLET, FILM COATED ORAL
Qty: 30 TABLET | Refills: 1 | Status: SHIPPED | OUTPATIENT
Start: 2019-12-19 | End: 2020-01-06

## 2019-12-19 RX ORDER — METHYLPREDNISOLONE ACETATE 80 MG/ML
80 INJECTION, SUSPENSION INTRA-ARTICULAR; INTRALESIONAL; INTRAMUSCULAR; PARENTERAL; SOFT TISSUE ONCE
Status: DISCONTINUED | OUTPATIENT
Start: 2019-12-19 | End: 2019-12-23 | Stop reason: HOSPADM

## 2019-12-19 NOTE — H&P
History of Present Illness: The patient is a 46 y o  male who presents with complaints of right lower back pain secondary lumbar spondylosis and is here today for right L3-5 medial branch radiofrequency ablation  Patient Active Problem List   Diagnosis    Impacted cerumen of left ear    Low back pain    Mixed emotional features as adjustment reaction    Allergic rhinitis due to pollen    Anxiety    Chronic prostatitis    Gastroesophageal reflux disease without esophagitis    Hyperlipidemia    Irritable bowel syndrome with diarrhea    Total bilirubin, elevated    Dysfunction of left eustachian tube    Physical exam    Refused influenza vaccine    Blepharoconjunctivitis of left eye    Lumbar spondylosis       No past medical history on file  No past surgical history on file        Current Outpatient Medications:     cetirizine (ZyrTEC) 10 mg tablet, Take 1 tablet by mouth as needed  , Disp: , Rfl:     diazepam (VALIUM) 5 mg tablet, Take 1 tablet (5 mg total) by mouth every 8 (eight) hours as needed for muscle spasms, Disp: 30 tablet, Rfl: 0    diclofenac (VOLTAREN) 75 mg EC tablet, TAKE 1 TABLET TWICE A DAY AFTER FOOD AS NEEDED FOR BACK PAIN, Disp: 60 tablet, Rfl: 0    fluticasone (FLONASE) 50 mcg/act nasal spray, 1-2 sprays into each nostril as needed  , Disp: , Rfl:     ibuprofen (MOTRIN) 400 mg tablet, Take 400 mg by mouth as needed for mild pain  , Disp: , Rfl:     Lido-Menthol-Methyl Sal-Camph (CBD KINGS EX), Apply topically, Disp: , Rfl:     methocarbamol (ROBAXIN) 500 mg tablet, Take 1 tablet (500 mg total) by mouth 3 (three) times a day as needed for muscle spasms for up to 7 days Do not drive or operate equipment while taking this medication due to increased sedation, Disp: 20 tablet, Rfl: 0    naproxen sodium (ALEVE) 220 MG tablet, Take 220 mg by mouth as needed  , Disp: , Rfl:     TURMERIC PO, Take by mouth, Disp: , Rfl:     Current Facility-Administered Medications:    bupivacaine (PF) (MARCAINE) 0 25 % injection 10 mL, 10 mL, Perineural, Once, Kimberli Coburn MD    lidocaine (PF) (XYLOCAINE-MPF) 2 % injection 10 mL, 10 mL, Infiltration, Once, Kimberli Coburn MD    methylPREDNISolone acetate (DEPO-MEDROL) injection 80 mg, 80 mg, Perineural, Once, Kimberli Coburn MD    sodium chloride (PF) 0 9 % injection 10 mL, 10 mL, Infiltration, Once, Kimberli Coburn MD    Allergies   Allergen Reactions    Latex Other (See Comments)     redness       Physical Exam:   Vitals:    12/19/19 0802   BP: 133/78   Pulse: 87   Resp: 20   Temp: 98 5 °F (36 9 °C)   SpO2: 97%     General: Awake, Alert, Oriented x 3, Mood and affect appropriate  Respiratory: Respirations even and unlabored  Cardiovascular: Peripheral pulses intact; no edema  Musculoskeletal Exam:   Right lower back tenderness    ASA Score: 1    Patient/Chart Verification  Patient ID Verified: Verbal  Consents Confirmed: To be obtained in the Pre-Procedure area  H&P( within 30 days) Verified: To be obtained in the Pre-Procedure area  Allergies Reviewed: Yes  Anticoag/NSAID held?: NA  Currently on antibiotics?: No    Assessment:   1   Spondylosis of lumbar region without myelopathy or radiculopathy        Plan: R L3-L5 RFA

## 2020-01-06 ENCOUNTER — OFFICE VISIT (OUTPATIENT)
Dept: PAIN MEDICINE | Facility: CLINIC | Age: 40
End: 2020-01-06
Payer: COMMERCIAL

## 2020-01-06 VITALS
TEMPERATURE: 98.5 F | WEIGHT: 218 LBS | BODY MASS INDEX: 28.76 KG/M2 | DIASTOLIC BLOOD PRESSURE: 92 MMHG | SYSTOLIC BLOOD PRESSURE: 131 MMHG | HEART RATE: 86 BPM

## 2020-01-06 DIAGNOSIS — M79.18 MYOFASCIAL PAIN SYNDROME: ICD-10-CM

## 2020-01-06 DIAGNOSIS — M50.120 CERVICAL DISC DISORDER WITH RADICULOPATHY OF MID-CERVICAL REGION: Primary | ICD-10-CM

## 2020-01-06 PROCEDURE — 99214 OFFICE O/P EST MOD 30 MIN: CPT | Performed by: ANESTHESIOLOGY

## 2020-01-06 RX ORDER — CYCLOBENZAPRINE HCL 10 MG
10 TABLET ORAL
Qty: 30 TABLET | Refills: 1 | Status: SHIPPED | OUTPATIENT
Start: 2020-01-06 | End: 2020-09-19 | Stop reason: SDUPTHER

## 2020-01-06 NOTE — PROGRESS NOTES
Assessment:  1  Cervical disc disorder with radiculopathy of mid-cervical region    2  Myofascial pain syndrome        Plan:  Due to the patient's neck and radiating arm pain/weakness, I will order an MRI of the cervical spine to evaluate  I advised him I will call with the results and discuss treatment moving forward  For now, I will have him set up for physical therapy again focusing on myofascial release  I will also have him discontinue the methocarbamol since is not helpful and start him on cyclobenzaprine 10 mg at bedtime  He was apprised of the most common side effects including sleepiness and dizziness  My impressions and treatment recommendations were discussed in detail with the patient who verbalized understanding and had no further questions  Discharge instructions were provided  I personally saw and examined the patient and I agree with the above discussed plan of care  Orders Placed This Encounter   Procedures    MRI cervical spine without contrast     Standing Status:   Future     Standing Expiration Date:   1/6/2024     Scheduling Instructions: There is no preparation for this test  Please leave your jewelry and valuables at home, wedding rings are the exception  Please bring your insurance cards, a form of photo ID and a list of your medications with you  Arrive 15 minutes prior to your appointment time in order to register  Please bring any prior CT or MRI studies of this area that were not performed at a [facility]  To schedule this appointment, please contact Central Scheduling at [phone]  Order Specific Question:   What is the patient's sedation requirement?      Answer:   No Sedation    Ambulatory referral to Physical Therapy     Standing Status:   Future     Standing Expiration Date:   1/6/2021     Referral Priority:   Routine     Referral Type:   Physical Therapy     Referral Reason:   Specialty Services Required     Requested Specialty: Physical Therapy     Number of Visits Requested:   1     Expiration Date:   1/6/2021     New Medications Ordered This Visit   Medications    cyclobenzaprine (FLEXERIL) 10 mg tablet     Sig: Take 1 tablet (10 mg total) by mouth daily at bedtime     Dispense:  30 tablet     Refill:  1       History of Present Illness:  Larry Caballero is a 46 y o  male who presents for a follow up office visit in regards to Back Pain  The patient has a history of lumbar spondylosis and myofascial pain who returns for follow-up  He was post undergo medial branch radiofrequency ablation of the lumbar region but this was canceled as he was having minimal pain complaints at that time  He was then started on methocarbamol 750 mg at bedtime  He reports that symptoms in the low back are still minimal but upper back/right neck pain is constant described to be dull-aching, tight like a muscle spasm and knot  He feels symptoms radiating into the right arm like a tightness as well as some weakness as compared to the left  He did try the methocarbamol this provided minimal relief  I have personally reviewed and/or updated the patient's past medical history, past surgical history, family history, social history, current medications, allergies, and vital signs today  Review of Systems   Respiratory: Negative for shortness of breath  Cardiovascular: Negative for chest pain  Gastrointestinal: Negative for constipation, diarrhea, nausea and vomiting  Musculoskeletal: Positive for joint swelling  Negative for arthralgias, gait problem and myalgias  Skin: Negative for rash  Neurological: Negative for dizziness, seizures and weakness  All other systems reviewed and are negative        Patient Active Problem List   Diagnosis    Impacted cerumen of left ear    Low back pain    Mixed emotional features as adjustment reaction    Allergic rhinitis due to pollen    Anxiety    Chronic prostatitis    Gastroesophageal reflux disease without esophagitis    Hyperlipidemia    Irritable bowel syndrome with diarrhea    Total bilirubin, elevated    Dysfunction of left eustachian tube    Physical exam    Refused influenza vaccine    Blepharoconjunctivitis of left eye    Lumbar spondylosis       No past medical history on file  No past surgical history on file  Family History   Problem Relation Age of Onset    Melanoma Mother        Social History     Occupational History    Not on file   Tobacco Use    Smoking status: Never Smoker    Smokeless tobacco: Never Used   Substance and Sexual Activity    Alcohol use: No     Comment: Occasional use per [employer]     Drug use: No    Sexual activity: Not on file       Current Outpatient Medications on File Prior to Visit   Medication Sig    cetirizine (ZyrTEC) 10 mg tablet Take 1 tablet by mouth as needed      Lido-Menthol-Methyl Sal-Camph (CBD KINGS EX) Apply topically    TURMERIC PO Take by mouth    [DISCONTINUED] methocarbamol (ROBAXIN) 750 mg tablet Take 1 tablet (750 mg total) by mouth daily at bedtime as needed for muscle spasms    [DISCONTINUED] diazepam (VALIUM) 5 mg tablet Take 1 tablet (5 mg total) by mouth every 8 (eight) hours as needed for muscle spasms    [DISCONTINUED] diclofenac (VOLTAREN) 75 mg EC tablet TAKE 1 TABLET TWICE A DAY AFTER FOOD AS NEEDED FOR BACK PAIN    [DISCONTINUED] fluticasone (FLONASE) 50 mcg/act nasal spray 1-2 sprays into each nostril as needed      [DISCONTINUED] ibuprofen (MOTRIN) 400 mg tablet Take 400 mg by mouth as needed for mild pain      [DISCONTINUED] naproxen sodium (ALEVE) 220 MG tablet Take 220 mg by mouth as needed       No current facility-administered medications on file prior to visit          Allergies   Allergen Reactions    Latex Other (See Comments)     redness       Physical Exam:    /92   Pulse 86   Temp 98 5 °F (36 9 °C) (Oral)   Wt 98 9 kg (218 lb)   BMI 28 76 kg/m²     Constitutional:normal, well developed, well nourished, alert, in no distress and non-toxic and no overt pain behavior    Eyes:anicteric  HEENT:grossly intact  Neck:supple, symmetric, trachea midline and no masses   Pulmonary:even and unlabored  Cardiovascular:No edema or pitting edema present  Skin:Normal without rashes or lesions and well hydrated  Psychiatric:Mood and affect appropriate  Neurologic:Cranial Nerves II-XII grossly intact  Musculoskeletal:normal     Cervical Spine Exam  Appearance:  Normal lordosis  Palpation/Tenderness:  right cervical paraspinal tenderness  right trapezium tenderness  Sensory:  no sensory deficits noted  Range of Motion:  Flexion:  Minimally limited  without pain  Extension:  Minimally limited  with pain  Lateral Flexion - Left:  No limitation  without pain  Lateral Flexion - Right:  Minimally limited  with pain  Rotation - Left:  No limitation  without pain  Rotation - Right:  Minimally limited  with pain  Motor Strength:  Left Arm Flexion  5/5  Left Arm Extension  5/5  Right Arm Flexion  5/5  Right Arm Extension  4/5  Left Wrist Flexion  5/5  Left Wrist Extension  5/5  Left Finger Abduction  5/5  Right Finger Abduction  5/5  Left Pincer Grasp  5/5  Reflexes:  Left Biceps:  1+   Right Biceps:  1+   Left Triceps:  1+   Right Triceps:  1+   Special Tests:  Left Spurlings:  negative  Right Spurlings  negative

## 2020-01-13 ENCOUNTER — EVALUATION (OUTPATIENT)
Dept: PHYSICAL THERAPY | Facility: REHABILITATION | Age: 40
End: 2020-01-13
Payer: COMMERCIAL

## 2020-01-13 DIAGNOSIS — M50.120 CERVICAL DISC DISORDER WITH RADICULOPATHY OF MID-CERVICAL REGION: ICD-10-CM

## 2020-01-13 DIAGNOSIS — M79.18 MYOFASCIAL PAIN SYNDROME: ICD-10-CM

## 2020-01-13 DIAGNOSIS — M54.6 ACUTE RIGHT-SIDED THORACIC BACK PAIN: Primary | ICD-10-CM

## 2020-01-13 PROCEDURE — 97161 PT EVAL LOW COMPLEX 20 MIN: CPT | Performed by: PHYSICAL THERAPIST

## 2020-01-13 PROCEDURE — 97140 MANUAL THERAPY 1/> REGIONS: CPT | Performed by: PHYSICAL THERAPIST

## 2020-01-13 NOTE — PROGRESS NOTES
PT Evaluation     Today's date: 2020  Patient name: Osbaldo Nguyễn  : 1980  MRN: [id number]  Referring provider: Miranda Villalta MD  Dx:   Encounter Diagnosis     ICD-10-CM    1  Acute right-sided thoracic back pain M54 6    2  Cervical disc disorder with radiculopathy of mid-cervical region M50 120 Ambulatory referral to Physical Therapy   3  Myofascial pain syndrome M79 18 Ambulatory referral to Physical Therapy       Start Time: 1710  Stop Time: 1800  Total time in clinic (min): 50 minutes    Assessment  Assessment details: Osbaldo Nguyễn is a 46 y o  male who presents to therapy for R sided upper thoracic pain and dx of myofascial syndrome  Presents with decreased scapular strength and spasms over the R mid trap and rhomboid  Pain increases with lifting, reaching, and UE activity and improves with rest and muscle relaxer's  Pain is limiting tolerance to prolonged standing and UE use which is required for work  Pt will benefit from skilled outpatient PT to address the below stated impairments, to address therapy goals, to reduce pain, and improve function  Therapist explained to pt: findings of IE, rehab diagnosis, and POC  Pt-centered goals reviewed and confirmed by pt  Instruction also given for HEP  Pt expressed verbal agreement and understanding and verified understanding via teach back method  Pt also expressed satisfaction that their current concerns were addressed at the end of the session  Impairments: abnormal or restricted ROM, activity intolerance, impaired physical strength, lacks appropriate home exercise program and pain with function  Barriers to therapy: None   Understanding of Dx/Px/POC: good   Prognosis: good    Goals  Short term goals: to be met in 2 weeks:  Pt independent with initial HEP, rationale, technique and frequency, for ROM and pain control     Pt will report an improvement in max pain score by at least 2 points on the numeric pain rating scale (NPRS) indicating a clinically important change and overall decrease in pain  Pt will report at least a 25% reduction in subjective pain complaints/symptoms to manage ADLs with reduced pain   Long term goals: to be met in 6-8 weeks  Pt will report at least a 75% reduction in subjective pain complaints/symptoms to manage ADLs with reduced pain   Improve mid and low trap MMT to at least 4/5 for improved posture and scapular stability to better manage work duties  Pt will improve FOTO score to better then expected outcome indicating an overall improvement in pain and function   Pt independent with rationale, technique and plan for performance of advanced HEP to ensure independent self-management of symptoms upon discharge  Achieve pts therapy goal: get rid of the pain      Plan  Patient would benefit from: skilled physical therapy  Planned modality interventions: TENS, thermotherapy: hydrocollator packs, traction, ultrasound, cryotherapy and low level laser therapy  Planned therapy interventions: body mechanics training, therapeutic training, therapeutic exercise, therapeutic activities, stretching, strengthening, neuromuscular re-education, patient education, home exercise program, functional ROM exercises, flexibility, manual therapy, Salas taping and joint mobilization  Frequency: 2-3x/week  Duration in weeks: 8  Treatment plan discussed with: patient        Subjective Evaluation    History of Present Illness  Mechanism of injury: Pt is a 45 yo M who presents with R sided neck pain/upper back pain  States the pain began in [facility] Oct 2019  Went to PCP for muscle spasm  Was given medication and this helped, but states he is having a lot of popping and feeling of knots  Was seeing pain management for low back pain but this temporality resolved  Was supposed to get ablasion but had to cancel due to being sick  Went back for follow up appt and informed doc of upper back pain   Was given flexeril and feels this has helped  Also taking dicelofen and has had relief  States the knots and spasms are just to the R of his spine  Denies UE n/t  Denies UE pain but will get pain up the R upper trap into the suboccipitals  Has MRI scheduled    Quality of life: good    Pain  Current pain ratin  At best pain ratin  At worst pain ratin (5/10 in the past few weeks)  Location: R upper back  Quality: dull ache, cramping and tight (knot-like)  Relieving factors: medications, relaxation and rest  Progression: improved    Social Support    Employment status: working (machinest - on feet, lifting, UE use)  Hand dominance: right    Treatments  Previous treatment: medication  Current treatment: medication  Patient Goals  Patient goals for therapy: decreased pain, independence with ADLs/IADLs and increased strength  Patient goal: get rid of the pain        Objective     Strength/Myotome Testing     Left Shoulder     Isolated Muscles   Lower trapezius: 3-   Middle trapezius: 3+   Rhomboids: 4     Right Shoulder     Isolated Muscles   Lower trapezius: 3-   Middle trapezius: 3+   Rhomboids: 4   Serratus anterior: 4        Upper quarter screen:  UE MMT grossly: 5/5  Reflexes: biceps and brachioradialis normal  UE sensation: no apparent deficits    AROM:  Neck AROM full and pain free, stretching with rotation and side bend  Thoracic AROM WNL and pain free    Palpation:  Dec upper thoracic PA mobility  TTP with spasm R thoracic paraspinal at T5-7    Flowsheet Rows      Most Recent Value   PT/OT G-Codes   Current Score  28   Projected Score  49             Precautions: anxiety, latex allergy    FOTO   = 28/49    Manual                           STM to R mid trap/rhomb/paraspinal DS            Pa t-spine mobs                                           Exercise Diary              UBE retro NV            Prone horzi abd 5"x10            Prone scaption NV            Prone shoulder ext NV            Band row NV            Band horiz abd NV Upper trap lifts on wall NV            Band shoulder ext with ER NV                                                                                                                                                                            Modalities

## 2020-01-16 ENCOUNTER — OFFICE VISIT (OUTPATIENT)
Dept: PHYSICAL THERAPY | Facility: REHABILITATION | Age: 40
End: 2020-01-16
Payer: COMMERCIAL

## 2020-01-16 DIAGNOSIS — M50.120 CERVICAL DISC DISORDER WITH RADICULOPATHY OF MID-CERVICAL REGION: Primary | ICD-10-CM

## 2020-01-16 DIAGNOSIS — M54.6 ACUTE RIGHT-SIDED THORACIC BACK PAIN: ICD-10-CM

## 2020-01-16 DIAGNOSIS — M79.18 MYOFASCIAL PAIN SYNDROME: ICD-10-CM

## 2020-01-16 PROCEDURE — 97140 MANUAL THERAPY 1/> REGIONS: CPT | Performed by: PHYSICAL THERAPIST

## 2020-01-16 PROCEDURE — 97112 NEUROMUSCULAR REEDUCATION: CPT | Performed by: PHYSICAL THERAPIST

## 2020-01-16 NOTE — PROGRESS NOTES
Daily Note     Today's date: 2020  Patient name: Wendi Navarrete  : 1980  MRN: [id number]  Referring provider: Johanny Dejesus MD  Dx:   Encounter Diagnosis     ICD-10-CM    1  Cervical disc disorder with radiculopathy of mid-cervical region M50 120    2  Myofascial pain syndrome M79 18    3  Acute right-sided thoracic back pain M54 6        Start Time: 1800  Stop Time: 1845  Total time in clinic (min): 45 minutes    Subjective: Pt states he was a little sore after last visit but not too bad  Objective: See treatment diary below      Assessment: Pt presents for first follow up after eval  Reviewed HEP  Pt required cues for correct form of exercises but with cueing was able to maintain good form  He continues to present with R mid trap and rhomboid spasms but responds favorably to STM  Pt will benefit from continued skilled outpatient PT to improve strength, to address therapy goals, to reduce pain, and improve function  Plan: Continue per plan of care  Progress treatment as tolerated         Precautions: anxiety, latex allergy    FOTO   = 28/49    Manual                          STM to R mid trap/rhomb/paraspinal DS DS           Pa t-spine mobs  DS                                         Exercise Diary             UBE retro NV            Prone horzi abd 5"x10 1# 5" 2x10           Prone scaption NV 0# 5" 2x10           Prone shoulder ext NV 5" 2x10           Band row NV            Band horiz abd NV            Upper trap lifts on wall NV            Band shoulder ext with ER NV XS purple  5" 2x10                                                                                                                                                                           Modalities

## 2020-01-20 ENCOUNTER — OFFICE VISIT (OUTPATIENT)
Dept: PHYSICAL THERAPY | Facility: REHABILITATION | Age: 40
End: 2020-01-20
Payer: COMMERCIAL

## 2020-01-20 DIAGNOSIS — M79.18 MYOFASCIAL PAIN SYNDROME: ICD-10-CM

## 2020-01-20 DIAGNOSIS — M54.6 ACUTE RIGHT-SIDED THORACIC BACK PAIN: ICD-10-CM

## 2020-01-20 DIAGNOSIS — M50.120 CERVICAL DISC DISORDER WITH RADICULOPATHY OF MID-CERVICAL REGION: Primary | ICD-10-CM

## 2020-01-20 PROCEDURE — 97140 MANUAL THERAPY 1/> REGIONS: CPT | Performed by: PHYSICAL THERAPIST

## 2020-01-20 PROCEDURE — 97112 NEUROMUSCULAR REEDUCATION: CPT | Performed by: PHYSICAL THERAPIST

## 2020-01-20 NOTE — PROGRESS NOTES
Daily Note     Today's date: 2020  Patient name: Isabella Lo  : 1980  MRN: [id number]  Referring provider: Teresa West MD  Dx:   Encounter Diagnosis     ICD-10-CM    1  Cervical disc disorder with radiculopathy of mid-cervical region M50 120    2  Myofascial pain syndrome M79 18    3  Acute right-sided thoracic back pain M54 6        Start Time:   Stop Time:   Total time in clinic (min): 45 minutes    Subjective: Pt states the back is feeling a little better  States he also saw the chiropractor Friday and the chiro worked on his subscapularis  Pt states his low back is hurting more today then the upper back due to a lot of running around over the weekend with his daughters bday party  Objective: See treatment diary below  Added horiz abd with band  Progressed prone ext    Assessment: Pt continues to present with R periscapular muscle spasms  He responded well to [facility] and responded well to addition of horiz abd with band  Pt was fatigued  by the end of tx  Pt will benefit from continued skilled outpatient PT to improve strength, to address therapy goals, to reduce pain, and improve function  Plan: Continue per plan of care  Progress treatment as tolerated         Precautions: anxiety, latex allergy    FOTO   = 28/49    Manual                         STM to R mid trap/rhomb/paraspinal DS DS DS          Pa t-spine mobs  DS DS                                        Exercise Diary            UBE retro NV            Prone horzi abd 5"x10 1# 5" 2x10 1# 5" 2x10          Prone scaption NV 0# 5" 2x10 0# 5" 2x10          Prone shoulder ext NV 5" 2x10 2# 5" 2x10          Band row NV            Band horiz abd NV  XS purple  5" 2x10          Upper trap lifts on wall NV            Band shoulder ext with ER NV XS purple  5" 2x10 XS purple  5" 2x10 Modalities

## 2020-01-21 ENCOUNTER — HOSPITAL ENCOUNTER (OUTPATIENT)
Dept: MRI IMAGING | Facility: HOSPITAL | Age: 40
Discharge: HOME | End: 2020-01-21
Attending: ANESTHESIOLOGY
Payer: COMMERCIAL

## 2020-01-21 DIAGNOSIS — M50.120 CERVICAL DISC DISORDER WITH RADICULOPATHY OF MID-CERVICAL REGION: ICD-10-CM

## 2020-01-21 PROCEDURE — 72141 MRI NECK SPINE W/O DYE: CPT

## 2020-01-23 ENCOUNTER — OFFICE VISIT (OUTPATIENT)
Dept: PHYSICAL THERAPY | Facility: REHABILITATION | Age: 40
End: 2020-01-23
Payer: COMMERCIAL

## 2020-01-23 DIAGNOSIS — M50.120 CERVICAL DISC DISORDER WITH RADICULOPATHY OF MID-CERVICAL REGION: Primary | ICD-10-CM

## 2020-01-23 DIAGNOSIS — M54.6 ACUTE RIGHT-SIDED THORACIC BACK PAIN: ICD-10-CM

## 2020-01-23 DIAGNOSIS — M79.18 MYOFASCIAL PAIN SYNDROME: ICD-10-CM

## 2020-01-23 PROCEDURE — 97140 MANUAL THERAPY 1/> REGIONS: CPT | Performed by: PHYSICAL THERAPIST

## 2020-01-23 PROCEDURE — 97112 NEUROMUSCULAR REEDUCATION: CPT | Performed by: PHYSICAL THERAPIST

## 2020-01-23 NOTE — PROGRESS NOTES
Daily Note     Today's date: 2020  Patient name: Alexandro Owen  : 1980  MRN: [id number]  Referring provider: Lillian Salas MD  Dx:   Encounter Diagnosis     ICD-10-CM    1  Cervical disc disorder with radiculopathy of mid-cervical region M50 120    2  Myofascial pain syndrome M79 18    3  Acute right-sided thoracic back pain M54 6        Start Time: 1800  Stop Time: 1850  Total time in clinic (min): 50 minutes    Subjective: Pt states the shoulder is feeling a little better  Objective: See treatment diary below      Assessment:  Pt still presents with R sided mid trap/rhomboid spasms but improved from last visit  He responded very well to progression in scapular strengthening and stabilization exercises, but did fatigue  Pt will benefit from continued skilled outpatient PT to improve strength, to address therapy goals, to reduce pain, and improve function  Plan: Continue per plan of care  Progress treatment as tolerated         Precautions: anxiety, latex allergy    FOTO   = 28/49      Manual                        STM to R mid trap/rhomb/paraspinal DS DS DS DS         Pa t-spine mobs  DS DS DS                                       Exercise Diary           UBE retro NV   L 4'         Prone horzi abd 5"x10 1# 5" 2x10 1# 5" 2x10 2# 5" 2x10         Prone scaption NV 0# 5" 2x10 0# 5" 2x10 1# 5" 2x10         Prone shoulder ext NV 5" 2x10 2# 5" 2x10 3# 5" 2x10         Band row NV            Band horiz abd NV  XS purple  5" 2x10 XS purple  5" 2x10         Upper trap lifts on wall NV            Band shoulder ext with ER NV XS purple  5" 2x10 XS purple  5" 2x10 XS purple  5" 2x10                                                                                                                                                                         Modalities

## 2020-01-28 ENCOUNTER — TELEPHONE (OUTPATIENT)
Dept: PAIN MEDICINE | Facility: CLINIC | Age: 40
End: 2020-01-28

## 2020-01-28 ENCOUNTER — APPOINTMENT (OUTPATIENT)
Dept: PHYSICAL THERAPY | Facility: REHABILITATION | Age: 40
End: 2020-01-28
Payer: COMMERCIAL

## 2020-01-28 NOTE — TELEPHONE ENCOUNTER
S/w pt, informed him of MRI results  Pt said he is going to PT and they are helping break up the knots in his neck and between his shoulders  Pt said in conjunction with the PT the muscle relaxer is giving him some relief  Pt said it helps him sleep at night  Pt said he will continue with PT and call back if anything worsens

## 2020-01-28 NOTE — TELEPHONE ENCOUNTER
Please let patient know that MRI C-spine was normal with no disc bulging or herniations  He does have muscle spasm and some very mild arthritis   Please get update on how cyclobenzaprine is working

## 2020-01-30 ENCOUNTER — OFFICE VISIT (OUTPATIENT)
Dept: PHYSICAL THERAPY | Facility: REHABILITATION | Age: 40
End: 2020-01-30
Payer: COMMERCIAL

## 2020-01-30 DIAGNOSIS — M54.6 ACUTE RIGHT-SIDED THORACIC BACK PAIN: ICD-10-CM

## 2020-01-30 DIAGNOSIS — M50.120 CERVICAL DISC DISORDER WITH RADICULOPATHY OF MID-CERVICAL REGION: Primary | ICD-10-CM

## 2020-01-30 DIAGNOSIS — M79.18 MYOFASCIAL PAIN SYNDROME: ICD-10-CM

## 2020-01-30 PROCEDURE — 97112 NEUROMUSCULAR REEDUCATION: CPT | Performed by: PHYSICAL THERAPIST

## 2020-01-30 PROCEDURE — 97140 MANUAL THERAPY 1/> REGIONS: CPT | Performed by: PHYSICAL THERAPIST

## 2020-01-30 NOTE — PROGRESS NOTES
Daily Note     Today's date: 2020  Patient name: Kraig Fisher  : 1980  MRN: [id number]  Referring provider: Raymon Mesa MD  Dx:   Encounter Diagnosis     ICD-10-CM    1  Cervical disc disorder with radiculopathy of mid-cervical region M50 120    2  Myofascial pain syndrome M79 18    3  Acute right-sided thoracic back pain M54 6        Start Time:   Stop Time:   Total time in clinic (min): 43 minutes    Subjective: Pt states he is hurting all over today  Feels like PT was helping, but was not here Tuesday and feels like his pain is worse now  States he got the results of his MRI and was told he has normal age related arthritis  Has been trying to do his HEP regularly  Objective: See treatment diary below      Assessment:  Pt presents with more spasms in the R trap/rhomboid today but reported improved pain following exercises  Pt will benefit from continued skilled outpatient PT to improve strength, to address therapy goals, to reduce pain, and improve function  Plan: Continue per plan of care  Progress treatment as tolerated         Precautions: anxiety, latex allergy    FOTO   = 28/49      Manual                       STM to R mid trap/rhomb/paraspinal DS DS DS DS DS 12'        Pa t-spine mobs  DS DS DS DS 3'                                      Exercise Diary          UBE retro NV   L 4' L1 4'        Prone horzi abd 5"x10 1# 5" 2x10 1# 5" 2x10 2# 5" 2x10 2# 5" 2x10        Prone scaption NV 0# 5" 2x10 0# 5" 2x10 1# 5" 2x10 1# 5" 2x10        Prone shoulder ext NV 5" 2x10 2# 5" 2x10 3# 5" 2x10 3# 5" 2x10        Band row NV            Band horiz abd NV  XS purple  5" 2x10 XS purple  5" 2x10 XS purple  5" 2x10        Upper trap lifts on wall NV            Band shoulder ext with ER NV XS purple  5" 2x10 XS purple  5" 2x10 XS purple  5" 2x10 XS purple  5" 2x10 Modalities

## 2020-02-03 ENCOUNTER — OFFICE VISIT (OUTPATIENT)
Dept: PHYSICAL THERAPY | Facility: REHABILITATION | Age: 40
End: 2020-02-03
Payer: COMMERCIAL

## 2020-02-03 DIAGNOSIS — M50.120 CERVICAL DISC DISORDER WITH RADICULOPATHY OF MID-CERVICAL REGION: Primary | ICD-10-CM

## 2020-02-03 DIAGNOSIS — M79.18 MYOFASCIAL PAIN SYNDROME: ICD-10-CM

## 2020-02-03 DIAGNOSIS — M54.6 ACUTE RIGHT-SIDED THORACIC BACK PAIN: ICD-10-CM

## 2020-02-03 PROCEDURE — 97112 NEUROMUSCULAR REEDUCATION: CPT

## 2020-02-03 PROCEDURE — 97140 MANUAL THERAPY 1/> REGIONS: CPT

## 2020-02-03 NOTE — PROGRESS NOTES
Daily Note     Today's date: 2/3/2020  Patient name: Daphney Lamar  : 1980  MRN: [id number]  Referring provider: Miguelito Wooten MD  Dx:   Encounter Diagnosis     ICD-10-CM    1  Cervical disc disorder with radiculopathy of mid-cervical region M50 120    2  Myofascial pain syndrome M79 18    3  Acute right-sided thoracic back pain M54 6        Start Time:   Stop Time:   Total time in clinic (min): 43 minutes    Subjective: Pt reports having continued pain along R thoracic spine  Notes he has an appointment for a professional massage scheduled this Wednesday, 2020  Objective: See treatment diary below      Assessment: Tolerated treatment well  Moderate to significant soft tissue restrictions noted along musculature along R thoracic spine  Increased tension noted along these areas following completion of prone horizontal abd  These restrictions began to resolve with manual therapy performed  Physical therapist, Sandra Romero, noted good P-A mobility along thoracic spine during mobilizations performed  Patient would benefit from continued PT to further decrease pain and frequency of symptoms  Plan: Continue per plan of care        Precautions: anxiety, latex allergy    FOTO   = 28/49      Manual   2/3                    STM to R mid trap/rhomb/paraspinal DS DS DS DS DS 12' AFB 15'       Pa t-spine mobs  DS DS DS DS 3' VINCENT  2'                                     Exercise Diary   2/3       UBE retro NV   L 4' L1 4' L1 4'       Prone horzi abd 5"x10 1# 5" 2x10 1# 5" 2x10 2# 5" 2x10 2# 5" 2x10 2# 5" 2x10       Prone scaption NV 0# 5" 2x10 0# 5" 2x10 1# 5" 2x10 1# 5" 2x10 1# 5" 2x10       Prone shoulder ext NV 5" 2x10 2# 5" 2x10 3# 5" 2x10 3# 5" 2x10 3# 5" 2x10       Band row NV            Band horiz abd NV  XS purple  5" 2x10 XS purple  5" 2x10 XS purple  5" 2x10 XS purple  5" 2x10       Upper trap lifts on wall NV            Band shoulder ext with ER NV XS purple  5" 2x10 XS purple  5" 2x10 XS purple  5" 2x10 XS purple  5" 2x10 XS purple  5" 2x10                                                                                                                                                                       Modalities

## 2020-02-06 ENCOUNTER — OFFICE VISIT (OUTPATIENT)
Dept: PHYSICAL THERAPY | Facility: REHABILITATION | Age: 40
End: 2020-02-06
Payer: COMMERCIAL

## 2020-02-06 DIAGNOSIS — M54.6 ACUTE RIGHT-SIDED THORACIC BACK PAIN: ICD-10-CM

## 2020-02-06 DIAGNOSIS — M50.120 CERVICAL DISC DISORDER WITH RADICULOPATHY OF MID-CERVICAL REGION: Primary | ICD-10-CM

## 2020-02-06 DIAGNOSIS — M79.18 MYOFASCIAL PAIN SYNDROME: ICD-10-CM

## 2020-02-06 PROCEDURE — 97140 MANUAL THERAPY 1/> REGIONS: CPT

## 2020-02-06 PROCEDURE — 97112 NEUROMUSCULAR REEDUCATION: CPT

## 2020-02-06 RX ORDER — DICLOFENAC SODIUM 75 MG/1
TABLET, DELAYED RELEASE ORAL
Qty: 60 TABLET | Refills: 0 | OUTPATIENT
Start: 2020-02-06

## 2020-02-06 NOTE — PROGRESS NOTES
Daily Note     Today's date: 2020  Patient name: Gerhardt Forward  : 1980  MRN: [id number]  Referring provider: Meri Hussein MD  Dx:   Encounter Diagnosis     ICD-10-CM    1  Cervical disc disorder with radiculopathy of mid-cervical region M50 120    2  Myofascial pain syndrome M79 18    3  Acute right-sided thoracic back pain M54 6        Start Time: 1828          Subjective: Pt has no new complaints to report prior to start of session  States he felt a little better following LV  Has bought lower weight dumbbells to       Objective: See treatment diary below      Assessment: Tolerated treatment well  Pt continues to present with moderate to significant soft tissue restrictions along medial border of R scapula  These restrictions do however show improvement since LV  Continues to have slight increase of tension along medial aspect of R shldr, as well as R cervical paraspinals during prone horiz abd  Patient would benefit from continued PT in effort to further improve strength, reduce soft tissue restrictions, decrease pain, and maximize function  Plan: Continue per plan of care        Precautions: anxiety, latex allergy    FOTO   = 28/49  2 = 71/49    Manual   2/3 2                   STM to R mid trap/rhomb/paraspinal DS DS DS DS DS 12' AFB 15' AFB   8'      Pa t-spine mobs  DS DS DS DS 3' VINCENT  2' DS  3'                                    Exercise Diary   2/3 2/6      UBE retro NV   L 4' L1 4' L1 4' L1 4'      Prone horzi abd 5"x10 1# 5" 2x10 1# 5" 2x10 2# 5" 2x10 2# 5" 2x10 2# 5" 2x10 2# 5" 2x10      Prone scaption NV 0# 5" 2x10 0# 5" 2x10 1# 5" 2x10 1# 5" 2x10 1# 5" 2x10 1# 5" 2x10      Prone shoulder ext NV 5" 2x10 2# 5" 2x10 3# 5" 2x10 3# 5" 2x10 3# 5" 2x10 3# 5" 2x10      Band row NV      XS  Purple  5"  2x10      Band horiz abd NV  XS purple  5" 2x10 XS purple  5" 2x10 XS purple  5" 2x10 XS purple  5" 2x10 XS purple  5" 2x10 Upper trap lifts on wall NV            Band shoulder ext with ER NV XS purple  5" 2x10 XS purple  5" 2x10 XS purple  5" 2x10 XS purple  5" 2x10 XS purple  5" 2x10 XS purple  5" 2x10                                                                                                                                                                      Modalities

## 2020-02-10 ENCOUNTER — OFFICE VISIT (OUTPATIENT)
Dept: PHYSICAL THERAPY | Facility: REHABILITATION | Age: 40
End: 2020-02-10
Payer: COMMERCIAL

## 2020-02-10 DIAGNOSIS — M54.6 ACUTE RIGHT-SIDED THORACIC BACK PAIN: ICD-10-CM

## 2020-02-10 DIAGNOSIS — M79.18 MYOFASCIAL PAIN SYNDROME: ICD-10-CM

## 2020-02-10 DIAGNOSIS — M50.120 CERVICAL DISC DISORDER WITH RADICULOPATHY OF MID-CERVICAL REGION: Primary | ICD-10-CM

## 2020-02-10 PROCEDURE — 97140 MANUAL THERAPY 1/> REGIONS: CPT | Performed by: PHYSICAL THERAPIST

## 2020-02-10 PROCEDURE — 97112 NEUROMUSCULAR REEDUCATION: CPT | Performed by: PHYSICAL THERAPIST

## 2020-02-13 ENCOUNTER — OFFICE VISIT (OUTPATIENT)
Dept: PHYSICAL THERAPY | Facility: REHABILITATION | Age: 40
End: 2020-02-13
Payer: COMMERCIAL

## 2020-02-13 DIAGNOSIS — M54.6 ACUTE RIGHT-SIDED THORACIC BACK PAIN: ICD-10-CM

## 2020-02-13 DIAGNOSIS — M79.18 MYOFASCIAL PAIN SYNDROME: ICD-10-CM

## 2020-02-13 DIAGNOSIS — M50.120 CERVICAL DISC DISORDER WITH RADICULOPATHY OF MID-CERVICAL REGION: Primary | ICD-10-CM

## 2020-02-13 PROCEDURE — 97140 MANUAL THERAPY 1/> REGIONS: CPT

## 2020-02-13 PROCEDURE — 97112 NEUROMUSCULAR REEDUCATION: CPT

## 2020-02-13 NOTE — PROGRESS NOTES
Daily Note     Today's date: 2020  Patient name: Fred Shelby  : 1980  MRN: [id number]  Referring provider: Mandeep Ashraf MD  Dx:   Encounter Diagnosis     ICD-10-CM    1  Cervical disc disorder with radiculopathy of mid-cervical region M50 120    2  Myofascial pain syndrome M79 18    3  Acute right-sided thoracic back pain M54 6        Start Time:   Stop Time: 185  Total time in clinic (min): 40 minutes    Subjective: Pt notes he had increase in symptoms/pain following LV  Notes he also went to a massage therapist and results were also not good following this massage appointment  States he has an appointment with a chiropractor tomorrow  Pt expressed interest in possibly being discharged today  Objective: See treatment diary below      Assessment: Tolerated treatment fair  Slight spasm in R LT after performing prone scaption  This spasm began to resolve following manual STM  Patient demonstrated fatigue post treatment and exhibited good technique with therapeutic exercises  Plan: Patient and evaluating PT in agreement for discharge this visit       Precautions: anxiety, latex allergy     FOTO   = 28/49  2 = 71/49     Manual   2/3 2/6  2/10  2/13                             STM to R mid trap/rhomb/paraspinal DS DS DS DS DS 12' AFB 15' AFB   8'  DS 15'  AFB 12'     Pa t-spine mobs   DS DS DS DS 3' VINCENT  2' DS  3'          Reassess               DS                                     Exercise Diary   2/3 2/6  2/10  2/13     UBE retro NV     L 4' L1 4' L1 4' L1 4'  L1 5'  L1 5'     Prone horzi abd 5"x10 1# 5" 2x10 1# 5" 2x10 2# 5" 2x10 2# 5" 2x10 2# 5" 2x10 2# 5" 2x10  2# 5" 2x10 np     Prone scaption NV 0# 5" 2x10 0# 5" 2x10 1# 5" 2x10 1# 5" 2x10 1# 5" 2x10 1# 5" 2x10  1# 5" 2x10  1# 5"   1x12     Prone shoulder ext NV 5" 2x10 2# 5" 2x10 3# 5" 2x10 3# 5" 2x10 3# 5" 2x10 3# 5" 2x10  3# 5" 2x10  np     Band row NV           XS  Purple  5"  2x10  XS  Purple  5"  2x10   XS  Purple  5"  2x10     Band horiz abd NV   XS purple  5" 2x10 XS purple  5" 2x10 XS purple  5" 2x10 XS purple  5" 2x10 XS purple  5" 2x10  XS  Purple  5"  2x10  XS  Purple  5"  2x10     Upper trap lifts on wall NV                     Band shoulder ext with ER NV XS purple  5" 2x10 XS purple  5" 2x10 XS purple  5" 2x10 XS purple  5" 2x10 XS purple  5" 2x10 XS purple  5" 2x10  XS  Purple  5"  2x10   XS  Purple  5"  2x10                                                                                                                                                                                                                                                                                                           Modalities

## 2020-02-14 NOTE — PROGRESS NOTES
PT DISCHARGE SUMMARY    Pt has participated in 5 PT sessions 1/13-2/13  Pt requested to make 2/13 his last visit  See 2/10 note for most recent objective data

## 2020-02-17 ENCOUNTER — APPOINTMENT (OUTPATIENT)
Dept: PHYSICAL THERAPY | Facility: REHABILITATION | Age: 40
End: 2020-02-17
Payer: COMMERCIAL

## 2020-02-20 ENCOUNTER — APPOINTMENT (OUTPATIENT)
Dept: PHYSICAL THERAPY | Facility: REHABILITATION | Age: 40
End: 2020-02-20
Payer: COMMERCIAL

## 2020-02-24 ENCOUNTER — APPOINTMENT (OUTPATIENT)
Dept: PHYSICAL THERAPY | Facility: REHABILITATION | Age: 40
End: 2020-02-24
Payer: COMMERCIAL

## 2020-02-27 ENCOUNTER — APPOINTMENT (OUTPATIENT)
Dept: PHYSICAL THERAPY | Facility: REHABILITATION | Age: 40
End: 2020-02-27
Payer: COMMERCIAL

## 2020-09-16 ENCOUNTER — OFFICE VISIT (OUTPATIENT)
Dept: URGENT CARE | Facility: CLINIC | Age: 40
End: 2020-09-16
Payer: COMMERCIAL

## 2020-09-16 VITALS
SYSTOLIC BLOOD PRESSURE: 132 MMHG | WEIGHT: 214 LBS | RESPIRATION RATE: 18 BRPM | TEMPERATURE: 98.2 F | HEIGHT: 73 IN | DIASTOLIC BLOOD PRESSURE: 74 MMHG | OXYGEN SATURATION: 98 % | BODY MASS INDEX: 28.36 KG/M2 | HEART RATE: 54 BPM

## 2020-09-16 DIAGNOSIS — H18.891 CORNEAL IRRITATION OF RIGHT EYE: ICD-10-CM

## 2020-09-16 DIAGNOSIS — S05.01XA ABRASION OF RIGHT CORNEA, INITIAL ENCOUNTER: Primary | ICD-10-CM

## 2020-09-16 PROCEDURE — 99213 OFFICE O/P EST LOW 20 MIN: CPT | Performed by: PHYSICIAN ASSISTANT

## 2020-09-16 RX ORDER — POLYMYXIN B SULFATE AND TRIMETHOPRIM 1; 10000 MG/ML; [USP'U]/ML
1 SOLUTION OPHTHALMIC EVERY 4 HOURS
Qty: 10 ML | Refills: 0 | Status: SHIPPED | OUTPATIENT
Start: 2020-09-16 | End: 2020-09-23

## 2020-09-16 RX ORDER — TETRACAINE HYDROCHLORIDE 5 MG/ML
1 SOLUTION OPHTHALMIC ONCE
Status: COMPLETED | OUTPATIENT
Start: 2020-09-16 | End: 2020-09-16

## 2020-09-16 RX ADMIN — TETRACAINE HYDROCHLORIDE 1 DROP: 5 SOLUTION OPHTHALMIC at 09:39

## 2020-09-16 NOTE — PATIENT INSTRUCTIONS
Corneal Abrasion   WHAT YOU NEED TO KNOW:   A corneal abrasion is a scratch on the cornea of your eye  The cornea is the clear layer that covers the front of your eye  A small scratch may heal in 1 to 2 days  Deeper or larger scratches may take longer to heal         DISCHARGE INSTRUCTIONS:   Contact your healthcare provider if:   · Your eye pain or vision gets worse  · You have yellow or green drainage from your eye  · You have questions or concerns about your condition or care  Medicines:   · Medicines  may be given in the form of eyedrops or ointment to help prevent an eye infection  You may also be given eye drops to decrease pain  Ask how to take this medicine safely  · Take your medicine as directed  Contact your healthcare provider if you think your medicine is not helping or if you have side effects  Tell him or her if you are allergic to any medicine  Keep a list of the medicines, vitamins, and herbs you take  Include the amounts, and when and why you take them  Bring the list or the pill bottles to follow-up visits  Carry your medicine list with you in case of an emergency  Follow up with your healthcare provider as directed:  Write down your questions so you remember to ask them during your visits  Self-care:   · Do not touch or rub your eye  · Ask your healthcare provider when you can start your normal activities  · Ask your healthcare provider when you can wear your contact lenses  · Wear sunglasses in bright light until your eyes feel better  Help prevent corneal abrasions:   · Remove your contact lenses if your eyes feel dry or irritated  · Wash your hands if you need to touch your eyes or your face  · Trim your child's fingernails so he cannot scratch his eye  · Wear protective eyewear when you work with chemicals, wood, dust, or metal      · Wear protective eyewear when you play sports  · Do not wear your contacts for longer than you should       · Do not wear colored lenses or lenses with shapes on them  These lenses may cause eye damage and vision loss  · Do not wear glitter makeup  Glitter can easily get into your eyes and under contact lenses  · Do not sleep with your contacts in your eyes  © 2017 [employer] Information is for End User's use only and may not be sold, redistributed or otherwise used for commercial purposes  All illustrations and images included in CareNotes® are the copyrighted property of [employer] , [employer]  or [employer]  The above information is an  only  It is not intended as medical advice for individual conditions or treatments  Talk to your doctor, nurse or pharmacist before following any medical regimen to see if it is safe and effective for you

## 2020-09-16 NOTE — LETTER
September 16, 2020     Patient: William Biggs   YOB: 1980   Date of Visit: 9/16/2020       To Whom it May Concern:    William Biggs was seen in my clinic on 9/16/2020  He may return to work on 9/17/2020  If you have any questions or concerns, please don't hesitate to call           Sincerely,          Arturo Reynolds PA-C        CC: No Recipients

## 2020-09-16 NOTE — PROGRESS NOTES
[facility]        NAME: Anushka Barnard is a 45 y o  male  : 1980    MRN: [id number]  DATE: 2020  TIME: 10:18 AM    Assessment and Plan   Abrasion of right cornea, initial encounter [S05 01XA]  1  Abrasion of right cornea, initial encounter  polymyxin b-trimethoprim (POLYTRIM) ophthalmic solution   2  Corneal irritation of right eye  fluorescein sodium sterile 1 mg ophthalmic strip 1 strip    tetracaine 0 5 % ophthalmic solution 1 drop     Follow up with eye doctor in 2 days for continued symptoms  Patient Instructions   Patient Instructions     Corneal Abrasion   WHAT YOU NEED TO KNOW:   A corneal abrasion is a scratch on the cornea of your eye  The cornea is the clear layer that covers the front of your eye  A small scratch may heal in 1 to 2 days  Deeper or larger scratches may take longer to heal         DISCHARGE INSTRUCTIONS:   Contact your healthcare provider if:   · Your eye pain or vision gets worse  · You have yellow or green drainage from your eye  · You have questions or concerns about your condition or care  Medicines:   · Medicines  may be given in the form of eyedrops or ointment to help prevent an eye infection  You may also be given eye drops to decrease pain  Ask how to take this medicine safely  · Take your medicine as directed  Contact your healthcare provider if you think your medicine is not helping or if you have side effects  Tell him or her if you are allergic to any medicine  Keep a list of the medicines, vitamins, and herbs you take  Include the amounts, and when and why you take them  Bring the list or the pill bottles to follow-up visits  Carry your medicine list with you in case of an emergency  Follow up with your healthcare provider as directed:  Write down your questions so you remember to ask them during your visits  Self-care:   · Do not touch or rub your eye       · Ask your healthcare provider when you can start your normal activities  · Ask your healthcare provider when you can wear your contact lenses  · Wear sunglasses in bright light until your eyes feel better  Help prevent corneal abrasions:   · Remove your contact lenses if your eyes feel dry or irritated  · Wash your hands if you need to touch your eyes or your face  · Trim your child's fingernails so he cannot scratch his eye  · Wear protective eyewear when you work with chemicals, wood, dust, or metal      · Wear protective eyewear when you play sports  · Do not wear your contacts for longer than you should  · Do not wear colored lenses or lenses with shapes on them  These lenses may cause eye damage and vision loss  · Do not wear glitter makeup  Glitter can easily get into your eyes and under contact lenses  · Do not sleep with your contacts in your eyes  © 2017 [facility] Information is for End User's use only and may not be sold, redistributed or otherwise used for commercial purposes  All illustrations and images included in CareNotes® are the copyrighted property of A D [employer] M [employer]  or [employer]  The above information is an  only  It is not intended as medical advice for individual conditions or treatments  Talk to your doctor, nurse or pharmacist before following any medical regimen to see if it is safe and effective for you  Proceed to  ER if symptoms worsen  Chief Complaint     Chief Complaint   Patient presents with    Eye Redness     Sunday at park, Monday right eye felt irritated  Using allergy eye drops with no relief  History of Present Illness       Patient presents for evaluation of right eye irritation which began on Sunday  He states he was at the dog park at the time and thought that his symptom was due to allergies  He reports irritation and a foreign body sensation  He has had some increased tearing but denies any purulent drainage    He has been using Karin eyedrops without any relief  Does work as a  but states that he wears safety glasses at all times and his symptoms did not begin at work  He wears glasses, no contact lens use  His vision is unchanged  Review of Systems   Review of Systems   Constitutional: Negative  HENT: Negative  Eyes: Positive for itching (irritation)  Negative for photophobia and redness  Neurological: Negative  Current Medications       Current Outpatient Medications:     cetirizine (ZyrTEC) 10 mg tablet, Take 1 tablet by mouth as needed  , Disp: , Rfl:     cyclobenzaprine (FLEXERIL) 10 mg tablet, Take 1 tablet (10 mg total) by mouth daily at bedtime, Disp: 30 tablet, Rfl: 1    Lido-Menthol-Methyl Sal-Camph (CBD KINGS EX), Apply topically, Disp: , Rfl:     polymyxin b-trimethoprim (POLYTRIM) ophthalmic solution, Administer 1 drop to the right eye every 4 (four) hours for 7 days, Disp: 10 mL, Rfl: 0    TURMERIC PO, Take by mouth, Disp: , Rfl:     Current Facility-Administered Medications:     fluorescein sodium sterile 1 mg ophthalmic strip 1 strip, 1 strip, Right Eye, Once, E  I  anita Shelton PA-C    tetracaine 0 5 % ophthalmic solution 1 drop, 1 drop, Right Eye, Once, E  I  anita Shelton PA-C    Current Allergies     Allergies as of 09/16/2020 - Reviewed 09/16/2020   Allergen Reaction Noted    Latex Other (See Comments) 09/17/2019            The following portions of the patient's history were reviewed and updated as appropriate: allergies, current medications, past family history, past medical history, past social history, past surgical history and problem list      Past Medical History:   Diagnosis Date    Anxiety     Arthritis        Past Surgical History:   Procedure Laterality Date    KNEE ARTHROSCOPY W/ ACL RECONSTRUCTION Right     KNEE SURGERY         Family History   Problem Relation Age of Onset    Melanoma Mother          Medications have been verified          Objective   BP 132/74   Pulse (!) 54   Temp 98 2 °F (36 8 °C) (Tympanic)   Resp 18   Ht 6' 1" (1 854 m)   Wt 97 1 kg (214 lb)   SpO2 98%   BMI 28 23 kg/m²        Physical Exam     Physical Exam  Vitals signs reviewed  Constitutional:       General: He is not in acute distress  Eyes:      General: Lids are normal  Lids are everted, no foreign bodies appreciated  Vision grossly intact  Gaze aligned appropriately  No allergic shiner, visual field deficit or scleral icterus  Right eye: No foreign body  Extraocular Movements: Extraocular movements intact  Conjunctiva/sclera: Conjunctivae normal       Pupils: Pupils are equal, round, and reactive to light  Right eye: Fluorescein uptake present  Skin:     General: Skin is warm and dry  Neurological:      Mental Status: He is alert

## 2020-09-19 ENCOUNTER — TELEMEDICINE (OUTPATIENT)
Dept: FAMILY MEDICINE CLINIC | Facility: CLINIC | Age: 40
End: 2020-09-19
Payer: COMMERCIAL

## 2020-09-19 DIAGNOSIS — M79.18 MYOFASCIAL PAIN SYNDROME: ICD-10-CM

## 2020-09-19 PROCEDURE — 1036F TOBACCO NON-USER: CPT | Performed by: FAMILY MEDICINE

## 2020-09-19 PROCEDURE — 99214 OFFICE O/P EST MOD 30 MIN: CPT | Performed by: FAMILY MEDICINE

## 2020-09-19 RX ORDER — CYCLOBENZAPRINE HCL 10 MG
10 TABLET ORAL
Qty: 90 TABLET | Refills: 1 | Status: SHIPPED | OUTPATIENT
Start: 2020-09-19 | End: 2021-01-11

## 2020-09-19 RX ORDER — DICLOFENAC SODIUM 75 MG/1
75 TABLET, DELAYED RELEASE ORAL DAILY
COMMUNITY
End: 2021-04-22 | Stop reason: SDUPTHER

## 2020-09-19 NOTE — PROGRESS NOTES
Virtual Regular Visit      Assessment/Plan:    Problem List Items Addressed This Visit        Musculoskeletal and Integument    Myofascial pain syndrome    Relevant Medications    cyclobenzaprine (FLEXERIL) 10 mg tablet    Other Relevant Orders    Ambulatory referral to Physical Medicine Rehab      Patient presents for follow-up  We had long discussion about long-term management of chronic low and upper back pain and severe spasms  Patient has noticed significant improvement with Flexeril but has been trying to avoid medication in an effort to overcome his painful back spasms  Patient had extensive evaluation including MRI of lumbar and cervical spine as well as extensive blood work which was negative for connective tissue disease  Patient did respond favorably to medial branch block injections but prefers to hold off radiofrequency ablation as long as he can  Patient admits to pain is influencing significant moodiness  He is missing on social interaction and family fun due to chronic low back pain  Patient is  and is raising 2 daughters  I strongly advised patient to use Flexeril more regularly  This medication would not cause any dependency or does not have addiction potential   I advised patient to use it for a few days with the 1st signs and symptoms of back pain and spasm, patient does not need to use Flexeril on a daily basis  He will continue with p r n  diclofenac daytime as needed  We discussed possibility of Botox injections that could improve his pain in the future  I advised patient to schedule evaluation with [facility] physiatry for further evaluation and treatment             Reason for visit is   Chief Complaint   Patient presents with    Virtual Regular Visit        Encounter provider Jade Flor MD    Provider located at [address] [place] [id number]      Recent Visits  Date Type Provider Dept   09/19/20 Siobhan Moulton MD [facility] [id number] [facility]ason recent visits within past 7 days and meeting all other requirements     Future Appointments  No visits were found meeting these conditions  Showing future appointments within next 150 days and meeting all other requirements        The patient was identified by name and date of birth  Elsy Black was informed that this is a telemedicine visit and that the visit is being conducted through [employer] and patient was informed that this is not a secure, HIPAA-complaint platform  He agrees to proceed     My office door was closed  No one else was in the room  He acknowledged consent and understanding of privacy and security of the video platform  The patient has agreed to participate and understands they can discontinue the visit at any time  Patient is aware this is a billable service  Subjective  Elsy Black is a 45 y o  male who presents for evaluation of chronic lower and upper back pain and back spasms   Patient presents for follow-up regarding chronic back pain  He has been experiencing recurrent symptoms of low and mid back spasms  Patient had extensive workup with [facility]  He had imaging studies including MRI of lumbar sacral spine and MRI of cervical spine  He responded favorably to medial branch block injections but never proceeded with ablation  Overall, his symptoms have been stabilized  Patient has noticed that his back pain usually worsens upon prolonged standing during weekdays and improves with rest during the weekends  Patient has noticed significant improvement with p r n  use of Flexeril  He uses Voltaren 75 mg rarely p r n  daytime, rarely  Patient would like to hold off radiofrequency ablation treatment since he has a good handle of treatment of chronic low back spasms  Patient has been using Flexeril very sparingly    He usually let tension in his lower back built for up to 2-3 weeks and then takes Flexeril for a few days and his symptoms of pain and spasm disappear very quickly  Patient states that for Flexeril provides him with significant relief within 24 hours  We also had extensive connective tissue disease workup in the past which was negative  Patient is requesting refill of medications  As he would like to hold off radiofrequency ablation at present time  Aside from that, patient has been feeling well  He denies any other concerns about his health at present time  He admits that pain is contributing to significant tension and mood swings  He is trying to overcome the pain by not taking medication and usually gives up after 2-3 weeks  That he takes Flexeril and his symptoms drastically improved  Back Pain   This is a chronic problem  The current episode started more than 1 year ago  The problem occurs daily  The problem has been waxing and waning since onset  The pain is present in the lumbar spine, sacro-iliac and thoracic spine  The quality of the pain is described as aching and cramping  The pain is worse during the day  The symptoms are aggravated by position, standing and stress  Stiffness is present all day  Pertinent negatives include no abdominal pain, bladder incontinence, bowel incontinence, chest pain, dysuria, fever, headaches, leg pain, numbness, paresis, paresthesias, pelvic pain, perianal numbness, tingling, weakness or weight loss  He has tried analgesics, muscle relaxant and NSAIDs for the symptoms  The treatment provided moderate relief          Past Medical History:   Diagnosis Date    Anxiety     Arthritis        Past Surgical History:   Procedure Laterality Date    KNEE ARTHROSCOPY W/ ACL RECONSTRUCTION Right     KNEE SURGERY         Current Outpatient Medications   Medication Sig Dispense Refill    diclofenac (VOLTAREN) 75 mg EC tablet Take 75 mg by mouth daily Take 1 tablet once a day as needed for back pain      cetirizine (ZyrTEC) 10 mg tablet Take 1 tablet by mouth as needed        cyclobenzaprine (FLEXERIL) 10 mg tablet Take 1 tablet (10 mg total) by mouth daily at bedtime as needed for muscle spasms 90 tablet 1    Lido-Menthol-Methyl Sal-Camph (CBD KINGS EX) Apply topically      TURMERIC PO Take by mouth       No current facility-administered medications for this visit  Allergies   Allergen Reactions    Latex Other (See Comments)     redness       Review of Systems   Constitutional: Negative  Negative for fever and weight loss  HENT: Negative  Eyes: Negative  Respiratory: Negative  Cardiovascular: Negative  Negative for chest pain  Gastrointestinal: Negative  Negative for abdominal pain and bowel incontinence  Endocrine: Negative  Genitourinary: Negative  Negative for bladder incontinence, dysuria and pelvic pain  Musculoskeletal: Positive for back pain  Skin: Negative  Allergic/Immunologic: Negative  Neurological: Negative for tingling, weakness, numbness, headaches and paresthesias  Hematological: Negative  Psychiatric/Behavioral: Positive for dysphoric mood (Due to pain)  Video Exam    There were no vitals filed for this visit  Physical Exam  Constitutional:       Appearance: Normal appearance  HENT:      Head: Normocephalic and atraumatic  Pulmonary:      Effort: Pulmonary effort is normal  No respiratory distress  Neurological:      General: No focal deficit present  Mental Status: He is alert and oriented to person, place, and time  Psychiatric:         Mood and Affect: Mood is anxious  Speech: Speech normal          Behavior: Behavior normal          Thought Content:  Thought content normal           I spent 30 minutes with patient today in which greater than 50% of the time was spent in counseling/coordination of care regarding Chronic pain management, appropriate medication use, education about medication benefits and side effects, psychological aspects of pain  VIRTUAL VISIT DISCLAIMER    Jane Banks acknowledges that he has consented to an online visit or consultation  He understands that the online visit is based solely on information provided by him, and that, in the absence of a face-to-face physical evaluation by the physician, the diagnosis he receives is both limited and provisional in terms of accuracy and completeness  This is not intended to replace a full medical face-to-face evaluation by the physician  Jane Darren understands and accepts these terms

## 2021-01-11 ENCOUNTER — TRANSCRIBE ORDERS (OUTPATIENT)
Dept: PAIN MEDICINE | Facility: CLINIC | Age: 41
End: 2021-01-11

## 2021-01-11 ENCOUNTER — OFFICE VISIT (OUTPATIENT)
Dept: PAIN MEDICINE | Facility: CLINIC | Age: 41
End: 2021-01-11
Payer: COMMERCIAL

## 2021-01-11 VITALS
RESPIRATION RATE: 18 BRPM | WEIGHT: 214 LBS | SYSTOLIC BLOOD PRESSURE: 130 MMHG | BODY MASS INDEX: 28.36 KG/M2 | HEIGHT: 73 IN | HEART RATE: 83 BPM | DIASTOLIC BLOOD PRESSURE: 88 MMHG

## 2021-01-11 DIAGNOSIS — G89.4 CHRONIC PAIN SYNDROME: ICD-10-CM

## 2021-01-11 DIAGNOSIS — M54.2 NECK PAIN: ICD-10-CM

## 2021-01-11 DIAGNOSIS — G89.29 CHRONIC BILATERAL LOW BACK PAIN WITHOUT SCIATICA: ICD-10-CM

## 2021-01-11 DIAGNOSIS — M79.18 MYOFASCIAL PAIN SYNDROME: ICD-10-CM

## 2021-01-11 DIAGNOSIS — M47.816 LUMBAR SPONDYLOSIS: ICD-10-CM

## 2021-01-11 DIAGNOSIS — M50.30 DEGENERATIVE DISC DISEASE, CERVICAL: ICD-10-CM

## 2021-01-11 DIAGNOSIS — M54.50 CHRONIC BILATERAL LOW BACK PAIN WITHOUT SCIATICA: ICD-10-CM

## 2021-01-11 PROCEDURE — 3008F BODY MASS INDEX DOCD: CPT | Performed by: NURSE PRACTITIONER

## 2021-01-11 PROCEDURE — 1036F TOBACCO NON-USER: CPT | Performed by: NURSE PRACTITIONER

## 2021-01-11 PROCEDURE — 99214 OFFICE O/P EST MOD 30 MIN: CPT | Performed by: NURSE PRACTITIONER

## 2021-01-11 RX ORDER — CYCLOBENZAPRINE HYDROCHLORIDE 15 MG/1
CAPSULE, EXTENDED RELEASE ORAL
Qty: 30 CAPSULE | Refills: 0 | Status: SHIPPED | OUTPATIENT
Start: 2021-01-11 | End: 2021-01-11

## 2021-01-11 RX ORDER — CYCLOBENZAPRINE HCL 10 MG
10 TABLET ORAL 3 TIMES DAILY PRN
Qty: 90 TABLET | Refills: 1 | Status: SHIPPED | OUTPATIENT
Start: 2021-01-11 | End: 2022-06-16

## 2021-01-11 NOTE — TELEPHONE ENCOUNTER
Can you make patient aware that Amrix not covered by his insurance  I got a message from the pharmacy  I will change his Flexeril to TID dosing instead

## 2021-01-11 NOTE — PROGRESS NOTES
Assessment:  1  Chronic pain syndrome    2  Chronic bilateral low back pain without sciatica    3  Neck pain    4  Myofascial pain syndrome    5  Lumbar spondylosis    6  Degenerative disc disease, cervical        Plan:  Alexandro Owen is a 45 y o  male who presents for a follow up office visit in regards to Back Pain and Neck Pain  The patient has a history of chronic pain syndrome secondary to lumbar spondylosis and myofascial pain  The patient presents today with ongoing mid back pain  Upon examination his pain peers myofascial and has multiple palpable trigger points in the trapezius paraspinal musculature  I will schedule him for trigger point injections to help with myofascial release  Complete risks and benefits including bleeding, infection, tissue reaction, nerve injury and allergic reaction were discussed  The approach was demonstrated using models and literature was provided  Verbal and written consent was obtained  I will also change the cyclobenzaprine to Amrix 15 mg so that it would provide 24 hour coverage without the drowsy side effects  I asked that he look online for manufacture coupons to make the medication more affordable    My impressions and treatment recommendations were discussed in detail with the patient who verbalized understanding and had no further questions  Discharge instructions were provided  I personally saw and examined the patient and I agree with the above discussed plan of care  No orders of the defined types were placed in this encounter  No orders of the defined types were placed in this encounter  History of Present Illness:  Alexandro Owen is a 45 y o  male who presents for a follow up office visit in regards to Back Pain and Neck Pain  The patient has a history of chronic pain syndrome secondary to lumbar spondylosis and myofascial pain    His last office visit was January 6, 2020, in which she was ordered MRI of the cervical spine, as well as physical therapy  He presents today with ongoing mid back pain  The pain is located primarily on the right side  It is constant and worse in the evening  He describes as spasms tightness and popping  He had failed to obtain relief from physical therapy  He is currently taking cyclobenzaprine 10 mg at bedtime which provides some pain relief  He is rating the pain a 3/10 on the numeric rating scale    He had underwent bilateral L3 through L5 medial branch block injections, which 1st 2 sets full  However he never underwent a radiofrequency ablation since the low back pain is minimal       I have personally reviewed and/or updated the patient's past medical history, past surgical history, family history, social history, current medications, allergies, and vital signs today  Review of Systems   Constitutional: Negative for fever and unexpected weight change  HENT: Negative for trouble swallowing  Eyes: Negative for visual disturbance  Respiratory: Negative for shortness of breath and wheezing  Cardiovascular: Negative for chest pain and palpitations  Gastrointestinal: Negative for constipation, diarrhea, nausea and vomiting  Endocrine: Negative for cold intolerance, heat intolerance and polydipsia  Genitourinary: Negative for difficulty urinating and frequency  Musculoskeletal: Positive for back pain and joint swelling  Negative for arthralgias, gait problem and myalgias  Skin: Negative for rash  Neurological: Negative for dizziness, seizures, syncope, weakness and headaches  Hematological: Does not bruise/bleed easily  Psychiatric/Behavioral: Negative for dysphoric mood  All other systems reviewed and are negative        Patient Active Problem List   Diagnosis    Impacted cerumen of left ear    Low back pain    Mixed emotional features as adjustment reaction    Allergic rhinitis due to pollen    Chronic prostatitis    Gastroesophageal reflux disease without esophagitis    Hyperlipidemia    Irritable bowel syndrome with diarrhea    Total bilirubin, elevated    Dysfunction of left eustachian tube    Physical exam    Refused influenza vaccine    Blepharoconjunctivitis of left eye    Lumbar spondylosis    Myofascial pain syndrome       Past Medical History:   Diagnosis Date    Anxiety     Arthritis        Past Surgical History:   Procedure Laterality Date    KNEE ARTHROSCOPY W/ ACL RECONSTRUCTION Right     KNEE SURGERY         Family History   Problem Relation Age of Onset    Melanoma Mother        Social History     Occupational History    Not on file   Tobacco Use    Smoking status: Never Smoker    Smokeless tobacco: Never Used   Substance and Sexual Activity    Alcohol use: No     Comment: Occasional use per [employer]     Drug use: No    Sexual activity: Not on file       Current Outpatient Medications on File Prior to Visit   Medication Sig    cetirizine (ZyrTEC) 10 mg tablet Take 1 tablet by mouth as needed      cyclobenzaprine (FLEXERIL) 10 mg tablet Take 1 tablet (10 mg total) by mouth daily at bedtime as needed for muscle spasms    diclofenac (VOLTAREN) 75 mg EC tablet Take 75 mg by mouth daily Take 1 tablet once a day as needed for back pain    Lido-Menthol-Methyl Sal-Camph (CBD KINGS EX) Apply topically    TURMERIC PO Take by mouth     No current facility-administered medications on file prior to visit  Allergies   Allergen Reactions    Latex Other (See Comments)     redness       Physical Exam:    /88   Pulse 83   Resp 18   Ht 6' 1" (1 854 m)   Wt 97 1 kg (214 lb)   BMI 28 23 kg/m²     Constitutional:normal, well developed, well nourished, alert, in no distress and non-toxic and no overt pain behavior    Eyes:anicteric  HEENT:grossly intact  Neck:supple, symmetric, trachea midline and no masses   Pulmonary:even and unlabored  Cardiovascular:No edema or pitting edema present  Skin:Normal without rashes or lesions and well hydrated  Psychiatric:Mood and affect appropriate  Neurologic:Cranial Nerves II-XII grossly intact  Musculoskeletal:normal    Thoracic Spine Exam    Appearance:  Normal lordosis  Palpation/Tenderness:  right thoracic paraspinal tenderness  trigger points palpable      Imaging  MRI CERVICAL SPINE WITHOUT CONTRAST     INDICATION: M50 120: Mid-cervical disc disorder, unspecified level      COMPARISON:  None      TECHNIQUE:  Sagittal T1, sagittal T2, sagittal inversion recovery, axial T2, axial  2D merge     IMAGE QUALITY:  Diagnostic     FINDINGS:     ALIGNMENT:    Reversal of normal lordosis suspicious for muscle spasm  No evidence of subluxation  No evidence of fracture  No evidence of scoliosis        MARROW SIGNAL:  Normal marrow signal is identified within the visualized bony structures    No discrete marrow lesion      CERVICAL AND VISUALIZED THORACIC CORD:  Normal signal within the visualized cord      PREVERTEBRAL AND PARASPINAL SOFT TISSUES:  Normal      VISUALIZED POSTERIOR FOSSA:  The visualized posterior fossa demonstrates no abnormal signal   Bilateral maxillary sinus mucosal thickening without air-fluid level     CERVICAL DISC SPACES:     C2-C3:  Normal      C3-C4:  Normal      C4-C5:  Normal disc, minimal degenerative facet arthrosis, no stenosis     C5-C6:  Normal disc, minimal degenerative facet arthrosis, no stenosis     C6-C7:  Normal disc, minimal degenerative facet arthrosis, no stenosis     C7-T1:  Normal      UPPER THORACIC DISC SPACES:  Normal      IMPRESSION:  Minimal multilevel degenerative facet arthrosis     No evidence of disc herniation, central canal or foraminal stenosis     Reversal of normal cervical lordosis suggesting possible muscle spasm    MRI LUMBAR SPINE WITHOUT CONTRAST     INDICATION: Low back pain radiating into the pelvis and left testicular region      COMPARISON:  Radiographs of lumbar spine from March 13, 2019 and MRI of the lumbar spine from May 25, 2017      TECHNIQUE: Sagittal T1, sagittal T2, sagittal inversion recovery, axial T1 and axial T2, coronal T2     IMAGE QUALITY:  Diagnostic     FINDINGS:     VERTEBRAL BODIES:  There is a mild gradual levoscoliotic curvature to the lower lumbar spine which may be positional   Transitional lumbosacral anatomy is noted with rudimentary S1-S2 disc      Mild anterior wedging deformity of T12 without edema      A tiny hemangioma is identified within the L5 vertebral body  Mild degenerative endplate signal abnormality is noted at L5-S1      SACRUM:  Normal signal within the sacrum  No evidence of insufficiency or stress fracture      DISTAL CORD AND CONUS:  Normal size and signal within the distal cord and conus        PARASPINAL SOFT TISSUES:  Paraspinal soft tissues are unremarkable      LOWER THORACIC DISC SPACES:  Schmorl's node deformity at the superior endplate of L84     LUMBAR DISC SPACES:  Disc desiccation and disc height loss at L5-S1      L1-L2:  Normal      L2-L3:  Normal      L3-L4:  Normal      L4-L5:  No significant disc bulge or disc herniation  Patent spinal canal and neural foramina      L5-S1: Mild circumferential disc bulge  Interval resolution of the central protrusion  No new disc herniation to result in spinal stenosis  Stable mild bilateral foraminal stenosis      IMPRESSION:     Slight interval progression of the degenerative discogenic disease at L5-S1  There is slightly greater disc height loss at L5-S1 with a stable  circumferential disc bulge  The previously noted central protrusion is no longer present    There is stable   mild bilateral foraminal stenosis      The findings are not significantly changed when compared to the prior study of May 25, 2017

## 2021-01-11 NOTE — TELEPHONE ENCOUNTER
I s/w pt and made him aware of the same as stated on Vivian's previous task  Pt understands flexeril 10 mg TID PRN has now been ordered in place of the Amrix

## 2021-01-19 ENCOUNTER — TELEPHONE (OUTPATIENT)
Dept: PAIN MEDICINE | Facility: CLINIC | Age: 41
End: 2021-01-19

## 2021-01-19 NOTE — TELEPHONE ENCOUNTER
Patient called requesting to schedule his next procedure & would like to know if his med insurance requires prior authorization   Please advise, tita    Call back# [phone]

## 2021-01-22 ENCOUNTER — TELEMEDICINE (OUTPATIENT)
Dept: FAMILY MEDICINE CLINIC | Facility: CLINIC | Age: 41
End: 2021-01-22
Payer: COMMERCIAL

## 2021-01-22 DIAGNOSIS — G43.109 MIGRAINE WITH AURA AND WITHOUT STATUS MIGRAINOSUS, NOT INTRACTABLE: Primary | ICD-10-CM

## 2021-01-22 DIAGNOSIS — Z13.6 SCREENING FOR CARDIOVASCULAR CONDITION: ICD-10-CM

## 2021-01-22 PROCEDURE — 99213 OFFICE O/P EST LOW 20 MIN: CPT | Performed by: FAMILY MEDICINE

## 2021-01-23 ENCOUNTER — LAB (OUTPATIENT)
Dept: LAB | Facility: CLINIC | Age: 41
End: 2021-01-23
Payer: COMMERCIAL

## 2021-01-23 DIAGNOSIS — Z13.6 SCREENING FOR CARDIOVASCULAR CONDITION: ICD-10-CM

## 2021-01-23 DIAGNOSIS — G43.109 MIGRAINE WITH AURA AND WITHOUT STATUS MIGRAINOSUS, NOT INTRACTABLE: ICD-10-CM

## 2021-01-23 LAB
25(OH)D3 SERPL-MCNC: 28.3 NG/ML (ref 30–100)
ALBUMIN SERPL BCP-MCNC: 4.3 G/DL (ref 3.5–5)
ALP SERPL-CCNC: 57 U/L (ref 46–116)
ALT SERPL W P-5'-P-CCNC: 31 U/L (ref 12–78)
ANION GAP SERPL CALCULATED.3IONS-SCNC: 7 MMOL/L (ref 4–13)
AST SERPL W P-5'-P-CCNC: 16 U/L (ref 5–45)
BASOPHILS # BLD AUTO: 0.04 THOUSANDS/ΜL (ref 0–0.1)
BASOPHILS NFR BLD AUTO: 1 % (ref 0–1)
BILIRUB SERPL-MCNC: 1.09 MG/DL (ref 0.2–1)
BUN SERPL-MCNC: 18 MG/DL (ref 5–25)
CALCIUM SERPL-MCNC: 9.2 MG/DL (ref 8.3–10.1)
CHLORIDE SERPL-SCNC: 104 MMOL/L (ref 100–108)
CHOLEST SERPL-MCNC: 225 MG/DL (ref 50–200)
CO2 SERPL-SCNC: 29 MMOL/L (ref 21–32)
CREAT SERPL-MCNC: 1.07 MG/DL (ref 0.6–1.3)
EOSINOPHIL # BLD AUTO: 0.27 THOUSAND/ΜL (ref 0–0.61)
EOSINOPHIL NFR BLD AUTO: 5 % (ref 0–6)
ERYTHROCYTE [DISTWIDTH] IN BLOOD BY AUTOMATED COUNT: 12.5 % (ref 11.6–15.1)
EST. AVERAGE GLUCOSE BLD GHB EST-MCNC: 111 MG/DL
GFR SERPL CREATININE-BSD FRML MDRD: 87 ML/MIN/1.73SQ M
GLUCOSE P FAST SERPL-MCNC: 95 MG/DL (ref 65–99)
HBA1C MFR BLD: 5.5 %
HCT VFR BLD AUTO: 49.1 % (ref 36.5–49.3)
HDLC SERPL-MCNC: 38 MG/DL
HGB BLD-MCNC: 16.2 G/DL (ref 12–17)
IMM GRANULOCYTES # BLD AUTO: 0.01 THOUSAND/UL (ref 0–0.2)
IMM GRANULOCYTES NFR BLD AUTO: 0 % (ref 0–2)
LDLC SERPL CALC-MCNC: 174 MG/DL (ref 0–100)
LYMPHOCYTES # BLD AUTO: 1.97 THOUSANDS/ΜL (ref 0.6–4.47)
LYMPHOCYTES NFR BLD AUTO: 37 % (ref 14–44)
MCH RBC QN AUTO: 28.8 PG (ref 26.8–34.3)
MCHC RBC AUTO-ENTMCNC: 33 G/DL (ref 31.4–37.4)
MCV RBC AUTO: 87 FL (ref 82–98)
MONOCYTES # BLD AUTO: 0.54 THOUSAND/ΜL (ref 0.17–1.22)
MONOCYTES NFR BLD AUTO: 10 % (ref 4–12)
NEUTROPHILS # BLD AUTO: 2.56 THOUSANDS/ΜL (ref 1.85–7.62)
NEUTS SEG NFR BLD AUTO: 47 % (ref 43–75)
NRBC BLD AUTO-RTO: 0 /100 WBCS
PLATELET # BLD AUTO: 253 THOUSANDS/UL (ref 149–390)
PMV BLD AUTO: 9.7 FL (ref 8.9–12.7)
POTASSIUM SERPL-SCNC: 3.8 MMOL/L (ref 3.5–5.3)
PROT SERPL-MCNC: 8.1 G/DL (ref 6.4–8.2)
RBC # BLD AUTO: 5.62 MILLION/UL (ref 3.88–5.62)
SODIUM SERPL-SCNC: 140 MMOL/L (ref 136–145)
TRIGL SERPL-MCNC: 66 MG/DL
TSH SERPL DL<=0.05 MIU/L-ACNC: 1.38 UIU/ML (ref 0.36–3.74)
WBC # BLD AUTO: 5.39 THOUSAND/UL (ref 4.31–10.16)

## 2021-01-23 PROCEDURE — 82306 VITAMIN D 25 HYDROXY: CPT

## 2021-01-23 PROCEDURE — 83036 HEMOGLOBIN GLYCOSYLATED A1C: CPT

## 2021-01-23 PROCEDURE — 86618 LYME DISEASE ANTIBODY: CPT

## 2021-01-23 PROCEDURE — 36415 COLL VENOUS BLD VENIPUNCTURE: CPT

## 2021-01-23 PROCEDURE — 80053 COMPREHEN METABOLIC PANEL: CPT

## 2021-01-23 PROCEDURE — 85025 COMPLETE CBC W/AUTO DIFF WBC: CPT

## 2021-01-23 PROCEDURE — 80061 LIPID PANEL: CPT

## 2021-01-23 PROCEDURE — 84443 ASSAY THYROID STIM HORMONE: CPT

## 2021-01-26 PROBLEM — G43.109 MIGRAINE WITH AURA AND WITHOUT STATUS MIGRAINOSUS, NOT INTRACTABLE: Status: ACTIVE | Noted: 2021-01-26

## 2021-01-26 LAB — B BURGDOR IGG+IGM SER-ACNC: 68

## 2021-01-28 ENCOUNTER — TELEPHONE (OUTPATIENT)
Dept: FAMILY MEDICINE CLINIC | Facility: CLINIC | Age: 41
End: 2021-01-28

## 2021-01-28 DIAGNOSIS — G43.109 MIGRAINE WITH AURA AND WITHOUT STATUS MIGRAINOSUS, NOT INTRACTABLE: ICD-10-CM

## 2021-01-28 DIAGNOSIS — E78.2 MIXED HYPERLIPIDEMIA: Primary | ICD-10-CM

## 2021-01-28 NOTE — TELEPHONE ENCOUNTER
Please contact patient  I reviewed blood work results  All labs were normal aside from slightly decreased level of vitamin-D and high cholesterol  · Please advised patient to start vitamin D3 1000 units daily  ·  please advised patient to start low-fat low-cholesterol diet, he will benefit from trial of fish oil capsules, 2400 mg daily  ·  nothing in the blood work to explain recurrent migraines, if headaches are still reoccurring- I would  recommendadvise to proceed with MRI of brain, orders placed      · Please advised patient to repeat fasting  Blood work to recheckcholesterol in 4-6 months, orders printed

## 2021-02-10 ENCOUNTER — PROCEDURE VISIT (OUTPATIENT)
Dept: PAIN MEDICINE | Facility: CLINIC | Age: 41
End: 2021-02-10
Payer: COMMERCIAL

## 2021-02-10 VITALS
BODY MASS INDEX: 28.23 KG/M2 | WEIGHT: 214 LBS | HEART RATE: 79 BPM | SYSTOLIC BLOOD PRESSURE: 137 MMHG | DIASTOLIC BLOOD PRESSURE: 82 MMHG

## 2021-02-10 DIAGNOSIS — M79.18 MYOFASCIAL PAIN SYNDROME: Primary | ICD-10-CM

## 2021-02-10 PROCEDURE — 20552 NJX 1/MLT TRIGGER POINT 1/2: CPT | Performed by: ANESTHESIOLOGY

## 2021-02-10 RX ORDER — BUPIVACAINE HYDROCHLORIDE 2.5 MG/ML
10 INJECTION, SOLUTION EPIDURAL; INFILTRATION; INTRACAUDAL ONCE
Status: COMPLETED | OUTPATIENT
Start: 2021-02-10 | End: 2021-02-10

## 2021-02-10 RX ADMIN — BUPIVACAINE HYDROCHLORIDE 10 ML: 2.5 INJECTION, SOLUTION EPIDURAL; INFILTRATION; INTRACAUDAL at 11:53

## 2021-02-10 NOTE — PROGRESS NOTES
Pre-procedure Diagnosis:   Myofascial pain  Post-procedure Diagnosis:   Myofascial pain  Operation Title(s):  Trigger point injections into right rhomboid x 4  Attending Surgeon:   Dain Andre MD  Anesthesia:   Local    Indications: The patient is a 45y o  year-old male with a diagnosis of myofascial pain  The patient's history and physical exam were reviewed  The risks, benefits and alternatives to the procedure were discussed, and all questions were answered to the patient's satisfaction  The patient agreed to proceed, and written informed consent was obtained  Procedure in Detail: The patient was brought into the exam room and placed in the sitting position on the exam room chair with the head flexed on a pillow  Trigger points were identified in the: right rhomboid x 4    Areas were cleansed with alcohol  Then, using a dry needling technique, each trigger point was injected with a 1 5 inch 25 gauge needle and 1 mL 0 25% bupivacaine     Disposition: The patient tolerated the procedure well and there were no apparent complications  The patient was given written discharge instructions for the procedure

## 2021-02-12 ENCOUNTER — HOSPITAL ENCOUNTER (OUTPATIENT)
Dept: MRI IMAGING | Facility: HOSPITAL | Age: 41
Discharge: HOME | End: 2021-02-12
Payer: COMMERCIAL

## 2021-02-12 ENCOUNTER — TRANSCRIBE ORDERS (OUTPATIENT)
Dept: PAIN MEDICINE | Facility: CLINIC | Age: 41
End: 2021-02-12

## 2021-02-12 DIAGNOSIS — G43.109 MIGRAINE WITH AURA AND WITHOUT STATUS MIGRAINOSUS, NOT INTRACTABLE: ICD-10-CM

## 2021-02-12 PROCEDURE — 70553 MRI BRAIN STEM W/O & W/DYE: CPT

## 2021-02-12 PROCEDURE — A9585 GADOBUTROL INJECTION: HCPCS | Performed by: FAMILY MEDICINE

## 2021-02-12 PROCEDURE — G1004 CDSM NDSC: HCPCS

## 2021-02-12 RX ADMIN — GADOBUTROL 9 ML: 604.72 INJECTION INTRAVENOUS at 21:44

## 2021-02-17 ENCOUNTER — TELEPHONE (OUTPATIENT)
Dept: FAMILY MEDICINE CLINIC | Facility: CLINIC | Age: 41
End: 2021-02-17

## 2021-02-17 NOTE — TELEPHONE ENCOUNTER
Lm and  instructions on pt cell and to call the office for appointment if pt persisting of headaches / migraines

## 2021-02-17 NOTE — TELEPHONE ENCOUNTER
Please contact patient  Brain MRI was essentially normal     Incidental findings include mild sinus congestion but no evidence of acute sinusitis or sinus struction  If patient's headaches/ migraines are still persisting -he should schedule follow-up appointment    Thank you

## 2021-03-11 ENCOUNTER — TELEMEDICINE (OUTPATIENT)
Dept: FAMILY MEDICINE CLINIC | Facility: CLINIC | Age: 41
End: 2021-03-11
Payer: COMMERCIAL

## 2021-03-11 VITALS — BODY MASS INDEX: 27.83 KG/M2 | TEMPERATURE: 98.6 F | WEIGHT: 210 LBS | HEIGHT: 73 IN

## 2021-03-11 DIAGNOSIS — Z03.818 ENCOUNTER FOR OBSERVATION FOR SUSPECTED EXPOSURE TO OTHER BIOLOGICAL AGENTS RULED OUT: ICD-10-CM

## 2021-03-11 DIAGNOSIS — B34.9 VIRAL INFECTION, UNSPECIFIED: ICD-10-CM

## 2021-03-11 DIAGNOSIS — J32.9 SINUSITIS, UNSPECIFIED CHRONICITY, UNSPECIFIED LOCATION: Primary | ICD-10-CM

## 2021-03-11 LAB — SARS-COV-2 RNA RESP QL NAA+PROBE: NEGATIVE

## 2021-03-11 PROCEDURE — U0003 INFECTIOUS AGENT DETECTION BY NUCLEIC ACID (DNA OR RNA); SEVERE ACUTE RESPIRATORY SYNDROME CORONAVIRUS 2 (SARS-COV-2) (CORONAVIRUS DISEASE [COVID-19]), AMPLIFIED PROBE TECHNIQUE, MAKING USE OF HIGH THROUGHPUT TECHNOLOGIES AS DESCRIBED BY CMS-2020-01-R: HCPCS | Performed by: NURSE PRACTITIONER

## 2021-03-11 PROCEDURE — U0005 INFEC AGEN DETEC AMPLI PROBE: HCPCS | Performed by: NURSE PRACTITIONER

## 2021-03-11 PROCEDURE — 99214 OFFICE O/P EST MOD 30 MIN: CPT | Performed by: NURSE PRACTITIONER

## 2021-03-11 PROCEDURE — 3008F BODY MASS INDEX DOCD: CPT | Performed by: NURSE PRACTITIONER

## 2021-03-11 PROCEDURE — 1036F TOBACCO NON-USER: CPT | Performed by: NURSE PRACTITIONER

## 2021-03-11 PROCEDURE — 3725F SCREEN DEPRESSION PERFORMED: CPT | Performed by: NURSE PRACTITIONER

## 2021-03-11 RX ORDER — AMOXICILLIN AND CLAVULANATE POTASSIUM 875; 125 MG/1; MG/1
1 TABLET, FILM COATED ORAL EVERY 12 HOURS SCHEDULED
Qty: 14 TABLET | Refills: 0 | Status: SHIPPED | OUTPATIENT
Start: 2021-03-11 | End: 2021-03-18

## 2021-03-11 NOTE — PROGRESS NOTES
COVID-19 Virtual Visit     Assessment/Plan:    Problem List Items Addressed This Visit     None      Visit Diagnoses     Encounter for observation for suspected exposure to other biological agents ruled out        Relevant Orders    Novel Coronavirus (Covid-19),PCR [facility] - Collected at [facility] or [facility]    Viral infection, unspecified        Relevant Orders    Novel Coronavirus (Covid-19),PCR [facility] - Collected at [facility] or [facility]         Disposition:     I referred patient to one of our centralized sites for a COVID-19 swab  This 37year old male presents today for headaches, sinus pressure, and fatigue for the past 5 days  Denies any other associated symptoms  Recommend proceeding with COVID-19 swab at centralized swabbing site today  Continue zyrtec  May trial OTC sudafed for decongestant  Will await COVID-19 results for further plan of care  Advised to self isolate until swab results are final      I have spent 10 minutes directly with the patient  Encounter provider Kiarra Lund [facility]    Provider located at [address] [place] [address]    Recent Visits  No visits were found meeting these conditions  Showing recent visits within past 7 days and meeting all other requirements     Today's Visits  Date Type Provider Dept   03/11/21 Telemedicine Kiarra Medley, [facility] today's visits and meeting all other requirements     Future Appointments  No visits were found meeting these conditions  Showing future appointments within next 150 days and meeting all other requirements      This virtual check-in was done via [employer] and patient was informed that this is not a secure, HIPAA-compliant platform  He agrees to proceed  Patient agrees to participate in a virtual check in via telephone or video visit instead of presenting to the office to address urgent/immediate medical needs   Patient is aware this is a billable service  After connecting through [facility], the patient was identified by name and date of birth  Imtiaz Ruiz was informed that this was a telemedicine visit and that the exam was being conducted confidentially over secure lines  My office door was closed  No one else was in the room  Imtiaz Ruiz acknowledged consent and understanding of privacy and security of the telemedicine visit  I informed the patient that I have reviewed his record in Epic and presented the opportunity for him to ask any questions regarding the visit today  The patient agreed to participate  Subjective:   Imtiaz Ruiz is a 37 y o  male who is concerned about COVID-19  Patient's symptoms include fatigue and headache  Patient denies fever, chills, malaise, congestion, rhinorrhea, sore throat, anosmia, loss of taste, cough, shortness of breath, chest tightness, abdominal pain, nausea, vomiting, diarrhea and myalgias  Date of symptom onset: 3/6/2021    Exposure:   Contact with a person who is under investigation (PUI) for or who is positive for COVID-19 within the last 14 days?: No    Hospitalized recently for fever and/or lower respiratory symptoms?: No      Currently a healthcare worker that is involved in direct patient care?: No      Works in a special setting where the risk of COVID-19 transmission may be high? (this may include long-term care, correctional and senior care facilities; homeless shelters; assisted-living facilities and group homes ): No      Resident in a special setting where the risk of COVID-19 transmission may be high? (this may include long-term care, correctional and senior care facilities; homeless shelters; assisted-living facilities and group homes ): No      Headaches, sinus pressure,fatigue for 5 days  No sore throat, rhinorrhea, or nasal congestion  Ears popping/crackling  Using nasal spray and zyrtec all year round  Using Tylenol sinus, helps a little    Currently working as a   Environment with a lot of welding, smoke, noise, gas powered equipment  No results found for: FAMILIAV2, [address], [address]  Past Medical History:   Diagnosis Date    Anxiety     Arthritis      Past Surgical History:   Procedure Laterality Date    KNEE ARTHROSCOPY W/ ACL RECONSTRUCTION Right     KNEE SURGERY       Current Outpatient Medications   Medication Sig Dispense Refill    cetirizine (ZyrTEC) 10 mg tablet Take 1 tablet by mouth as needed        cyclobenzaprine (FLEXERIL) 10 mg tablet Take 1 tablet (10 mg total) by mouth 3 (three) times a day as needed for muscle spasms 90 tablet 1    diclofenac (VOLTAREN) 75 mg EC tablet Take 75 mg by mouth daily Take 1 tablet once a day as needed for back pain       No current facility-administered medications for this visit  Allergies   Allergen Reactions    Latex Other (See Comments)     redness       Review of Systems   Constitutional: Positive for fatigue  Negative for chills and fever  HENT: Negative for congestion, rhinorrhea and sore throat  Respiratory: Negative for cough, chest tightness and shortness of breath  Gastrointestinal: Negative for abdominal pain, diarrhea, nausea and vomiting  Musculoskeletal: Negative for myalgias  Neurological: Positive for headaches  Objective:    Vitals:    03/11/21 0824   Temp: 98 6 °F (37 °C)   Weight: 95 3 kg (210 lb)   Height: 6' 1" (1 854 m)       Physical Exam  Constitutional:       General: He is not in acute distress  Appearance: Normal appearance  He is not ill-appearing, toxic-appearing or diaphoretic  HENT:      Head: Atraumatic  Pulmonary:      Effort: Pulmonary effort is normal  No respiratory distress  Comments: No cough  No tachypnea  No audible wheezing  Neurological:      Mental Status: He is alert     Psychiatric:         Mood and Affect: Mood normal        VIRTUAL VISIT DISCLAIMER    Isabella Lo acknowledges that he has consented to an online visit or consultation  He understands that the online visit is based solely on information provided by him, and that, in the absence of a face-to-face physical evaluation by the physician, the diagnosis he receives is both limited and provisional in terms of accuracy and completeness  This is not intended to replace a full medical face-to-face evaluation by the physician  Sammy Canada understands and accepts these terms

## 2021-03-19 DIAGNOSIS — M79.18 MYOFASCIAL PAIN SYNDROME: ICD-10-CM

## 2021-03-22 RX ORDER — CYCLOBENZAPRINE HCL 10 MG
TABLET ORAL
Qty: 90 TABLET | Refills: 1 | OUTPATIENT
Start: 2021-03-22

## 2021-04-21 ENCOUNTER — TELEPHONE (OUTPATIENT)
Dept: PAIN MEDICINE | Facility: CLINIC | Age: 41
End: 2021-04-21

## 2021-04-21 NOTE — TELEPHONE ENCOUNTER
Pt said he sent his pain diary through the mail and he sent it in late after having his procedure  He never heard back from anyone   # [phone]

## 2021-04-21 NOTE — TELEPHONE ENCOUNTER
Carolina Nagy forwarded a My Chart message yest to clerical to help pt set up ov to discuss back issues and eval new treatment plan  This appt should be with Caio Trevino  Looks like pt called again today about same thing  Pls call pt and assist with setting up appt w/NM

## 2021-04-22 ENCOUNTER — OFFICE VISIT (OUTPATIENT)
Dept: PAIN MEDICINE | Facility: CLINIC | Age: 41
End: 2021-04-22
Payer: COMMERCIAL

## 2021-04-22 VITALS
SYSTOLIC BLOOD PRESSURE: 138 MMHG | BODY MASS INDEX: 28.36 KG/M2 | WEIGHT: 214 LBS | RESPIRATION RATE: 18 BRPM | HEART RATE: 75 BPM | DIASTOLIC BLOOD PRESSURE: 91 MMHG | HEIGHT: 73 IN

## 2021-04-22 DIAGNOSIS — M54.9 MID BACK PAIN: ICD-10-CM

## 2021-04-22 DIAGNOSIS — G89.4 CHRONIC PAIN SYNDROME: Primary | ICD-10-CM

## 2021-04-22 DIAGNOSIS — M79.18 MYOFASCIAL PAIN SYNDROME: ICD-10-CM

## 2021-04-22 PROCEDURE — 3008F BODY MASS INDEX DOCD: CPT | Performed by: NURSE PRACTITIONER

## 2021-04-22 PROCEDURE — 1036F TOBACCO NON-USER: CPT | Performed by: NURSE PRACTITIONER

## 2021-04-22 PROCEDURE — 99214 OFFICE O/P EST MOD 30 MIN: CPT | Performed by: NURSE PRACTITIONER

## 2021-04-22 RX ORDER — DICLOFENAC SODIUM 75 MG/1
TABLET, DELAYED RELEASE ORAL
Qty: 60 TABLET | Refills: 5 | Status: SHIPPED | OUTPATIENT
Start: 2021-04-22 | End: 2022-03-22 | Stop reason: SDUPTHER

## 2021-04-22 NOTE — PROGRESS NOTES
Assessment:  1  Chronic pain syndrome    2  Mid back pain    3  Myofascial pain syndrome        Plan:  Darius Patrick is a 37 y o  male who presents for a follow up office visit in regards to mid back pain  Patient has a history of chronic pain syndrome secondary to low back pain, mid back pain, lumbar spondylosis, and myofascial pain  Patient presents today with increased pain in the mid back  He had underwent trigger point injections into the right rhomboid muscle on February 10, 2021 which had provided 50% pain relief until a month ago when he was working in his garage as well as metal detecting in the woods  Upon examination he has palpable trigger points  I will schedule him for trigger point injections again to help with myofascial release  Complete risks and benefits including bleeding, infection, tissue reaction, nerve injury and allergic reaction were discussed  The approach was demonstrated using models and literature was provided  Verbal and written consent was obtained  He will continue on the cyclobenzaprine and diclofenac as prescribed  Refills for diclofenac were sent to the pharmacy per his request     My impressions and treatment recommendations were discussed in detail with the patient who verbalized understanding and had no further questions  Discharge instructions were provided  I personally saw and examined the patient and I agree with the above discussed plan of care  Orders Placed This Encounter   Procedures    Injection trigger point 1 or 2 muscles     Standing Status:   Future     Standing Expiration Date:   4/22/2022     Scheduling Instructions:      Right rhomboid TPI with me or Dr Jeanine German Medications Ordered This Visit   Medications    diclofenac (VOLTAREN) 75 mg EC tablet     Sig: Take 1 tablet twice a day as needed for back pain   Take with food     Dispense:  60 tablet     Refill:  5       History of Present Illness:  Darius Patrick is a 37 y o  male who presents for a follow up office visit in regards to mid back pain  Patient has a history of chronic pain syndrome secondary to low back pain, mid back pain, lumbar spondylosis, and myofascial pain  His last office visit was February 10, 2021 in which he had trigger point injections into the right rhomboid  He presents today with ongoing pain   On the right side of the midback  The pain had improved after trigger point injections by 50%  On March 14, 2021 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, and also 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, and noted increased pain in the right rhomboid  The pain is intermittent, occurring mostly in the evening  He describes as spasms and cramping  He also hears a cracking and popping noise at times  He is currently rating his pain 8/10 on the numeric rating scale  He is taking diclofenac 75 mg 1 to 2 times a day as needed, and Flexeril 10 mg 1 tablet at bedtime as needed  The medication provides 50% pain relief without side effects  I have personally reviewed and/or updated the patient's past medical history, past surgical history, family history, social history, current medications, allergies, and vital signs today  Review of Systems   Respiratory: Negative for shortness of breath  Cardiovascular: Negative for chest pain  Gastrointestinal: Negative for constipation, diarrhea, nausea and vomiting  Musculoskeletal: Positive for back pain  Negative for arthralgias, gait problem, joint swelling and myalgias  Skin: Negative for rash  Neurological: Negative for dizziness, seizures and weakness  All other systems reviewed and are negative        Patient Active Problem List   Diagnosis    Impacted cerumen of left ear    Low back pain    Mixed emotional features as adjustment reaction    Allergic rhinitis due to pollen    Chronic prostatitis    Gastroesophageal reflux disease without esophagitis    Mixed hyperlipidemia    Irritable bowel syndrome with diarrhea    Total bilirubin, elevated    Dysfunction of left eustachian tube    Physical exam    Refused influenza vaccine    Blepharoconjunctivitis of left eye    Lumbar spondylosis    Myofascial pain syndrome    Migraine with aura and without status migrainosus, not intractable       Past Medical History:   Diagnosis Date    Anxiety     Arthritis        Past Surgical History:   Procedure Laterality Date    KNEE ARTHROSCOPY W/ ACL RECONSTRUCTION Right     KNEE SURGERY         Family History   Problem Relation Age of Onset    Melanoma Mother        Social History     Occupational History    Not on file   Tobacco Use    Smoking status: Never Smoker    Smokeless tobacco: Never Used   Substance and Sexual Activity    Alcohol use: No     Comment: Occasional use per [employer]     Drug use: No    Sexual activity: Not on file       Current Outpatient Medications on File Prior to Visit   Medication Sig    cetirizine (ZyrTEC) 10 mg tablet Take 1 tablet by mouth as needed      cyclobenzaprine (FLEXERIL) 10 mg tablet Take 1 tablet (10 mg total) by mouth 3 (three) times a day as needed for muscle spasms    [DISCONTINUED] diclofenac (VOLTAREN) 75 mg EC tablet Take 75 mg by mouth daily Take 1 tablet once a day as needed for back pain     No current facility-administered medications on file prior to visit  Allergies   Allergen Reactions    Latex Other (See Comments)     redness       Physical Exam:    /91   Pulse 75   Resp 18   Ht 6' 1" (1 854 m)   Wt 97 1 kg (214 lb)   BMI 28 23 kg/m²     Constitutional:normal, well developed, well nourished, alert, in no distress and non-toxic and no overt pain behavior    Eyes:anicteric  HEENT:grossly intact  Neck:supple, symmetric, trachea midline and no masses   Pulmonary:even and unlabored  Cardiovascular:No edema or pitting edema present  Skin:Normal without rashes or lesions and well hydrated  Psychiatric:Mood and affect appropriate  Neurologic:Cranial Nerves II-XII grossly intact  Musculoskeletal:normal    Thoracic Spine Exam    Appearance:  Normal lordosis  Palpation/Tenderness:  right thoracic paraspinal tenderness  right thoracic spasm  trigger points palpable right rhomboid          Imaging    MRI CERVICAL SPINE WITHOUT CONTRAST     INDICATION: M50 120: Mid-cervical disc disorder, unspecified level      COMPARISON:  None      TECHNIQUE:  Sagittal T1, sagittal T2, sagittal inversion recovery, axial T2, axial  2D merge     IMAGE QUALITY:  Diagnostic     FINDINGS:     ALIGNMENT:    Reversal of normal lordosis suspicious for muscle spasm  No evidence of subluxation  No evidence of fracture  No evidence of scoliosis        MARROW SIGNAL:  Normal marrow signal is identified within the visualized bony structures   No discrete marrow lesion      CERVICAL AND VISUALIZED THORACIC CORD:  Normal signal within the visualized cord      PREVERTEBRAL AND PARASPINAL SOFT TISSUES:  Normal      VISUALIZED POSTERIOR FOSSA:  The visualized posterior fossa demonstrates no abnormal signal   Bilateral maxillary sinus mucosal thickening without air-fluid level     CERVICAL DISC SPACES:     C2-C3:  Normal      C3-C4:  Normal      C4-C5:  Normal disc, minimal degenerative facet arthrosis, no stenosis     C5-C6:  Normal disc, minimal degenerative facet arthrosis, no stenosis     C6-C7:  Normal disc, minimal degenerative facet arthrosis, no stenosis     C7-T1:  Normal      UPPER THORACIC DISC SPACES:  Normal      IMPRESSION:  Minimal multilevel degenerative facet arthrosis     No evidence of disc herniation, central canal or foraminal stenosis     Reversal of normal cervical lordosis suggesting possible muscle spasm    MRI LUMBAR SPINE WITHOUT CONTRAST     INDICATION: Low back pain radiating into the pelvis and left testicular region      COMPARISON:  Radiographs of lumbar spine from March 13, 2019 and MRI of the lumbar spine from May 25, 2017      TECHNIQUE:  Sagittal T1, sagittal T2, sagittal inversion recovery, axial T1 and axial T2, coronal T2     IMAGE QUALITY:  Diagnostic     FINDINGS:     VERTEBRAL BODIES:  There is a mild gradual levoscoliotic curvature to the lower lumbar spine which may be positional   Transitional lumbosacral anatomy is noted with rudimentary S1-S2 disc      Mild anterior wedging deformity of T12 without edema      A tiny hemangioma is identified within the L5 vertebral body  Mild degenerative endplate signal abnormality is noted at L5-S1      SACRUM:  Normal signal within the sacrum  No evidence of insufficiency or stress fracture      DISTAL CORD AND CONUS:  Normal size and signal within the distal cord and conus        PARASPINAL SOFT TISSUES:  Paraspinal soft tissues are unremarkable      LOWER THORACIC DISC SPACES:  Schmorl's node deformity at the superior endplate of R74     LUMBAR DISC SPACES:  Disc desiccation and disc height loss at L5-S1      L1-L2:  Normal      L2-L3:  Normal      L3-L4:  Normal      L4-L5:  No significant disc bulge or disc herniation  Patent spinal canal and neural foramina      L5-S1: Mild circumferential disc bulge  Interval resolution of the central protrusion  No new disc herniation to result in spinal stenosis  Stable mild bilateral foraminal stenosis      IMPRESSION:     Slight interval progression of the degenerative discogenic disease at L5-S1  There is slightly greater disc height loss at L5-S1 with a stable  circumferential disc bulge  The previously noted central protrusion is no longer present  There is stable   mild bilateral foraminal stenosis      The findings are not significantly changed when compared to the prior study of May 25, 2017

## 2021-04-23 ENCOUNTER — TRANSCRIBE ORDERS (OUTPATIENT)
Dept: PAIN MEDICINE | Facility: CLINIC | Age: 41
End: 2021-04-23

## 2021-05-06 ENCOUNTER — TELEMEDICINE (OUTPATIENT)
Dept: FAMILY MEDICINE CLINIC | Facility: CLINIC | Age: 41
End: 2021-05-06
Payer: COMMERCIAL

## 2021-05-06 VITALS — WEIGHT: 214 LBS | HEIGHT: 73 IN | BODY MASS INDEX: 28.36 KG/M2

## 2021-05-06 DIAGNOSIS — E78.2 MIXED HYPERLIPIDEMIA: Primary | ICD-10-CM

## 2021-05-06 DIAGNOSIS — J30.1 ALLERGIC RHINITIS DUE TO POLLEN, UNSPECIFIED SEASONALITY: ICD-10-CM

## 2021-05-06 DIAGNOSIS — M79.18 MYOFASCIAL PAIN SYNDROME: ICD-10-CM

## 2021-05-06 DIAGNOSIS — G43.109 MIGRAINE WITH AURA AND WITHOUT STATUS MIGRAINOSUS, NOT INTRACTABLE: ICD-10-CM

## 2021-05-06 PROCEDURE — 99213 OFFICE O/P EST LOW 20 MIN: CPT | Performed by: FAMILY MEDICINE

## 2021-05-06 PROCEDURE — 1036F TOBACCO NON-USER: CPT | Performed by: FAMILY MEDICINE

## 2021-05-06 RX ORDER — RIBOFLAVIN (VITAMIN B2) 400 MG
TABLET ORAL
Qty: 90 TABLET | Refills: 3 | Status: SHIPPED | OUTPATIENT
Start: 2021-05-06

## 2021-05-06 NOTE — PROGRESS NOTES
Virtual Regular Visit      Assessment/Plan:    Problem List Items Addressed This Visit        Respiratory    Allergic rhinitis due to pollen      Start Zyrtec and Nasacort            Cardiovascular and Mediastinum    Migraine with aura and without status migrainosus, not intractable     ·  Normal brain MRI February 2021  · Patient reports improvement of headaches  · Start preventative regimen with magnesium oxide 400 mg twice a day and vitamin B2 400 mg daily         Relevant Medications    Riboflavin 400 MG TABS    magnesium oxide (MAG-OX) 400 mg       Musculoskeletal and Integument    Myofascial pain syndrome       Other    Mixed hyperlipidemia - Primary    Relevant Orders    Hemoglobin A1C       patient presents for follow-up of chronic medical conditions  He reports significant improvement of migraine/ ocular headache since our last video visit  He admits to ongoing symptoms of seasonal allergies  Patient will start regimen of Zyrtec and Nasacort  I advised him to start preventative magnesium oxide and vitamin B2     patient will repeat blood work including lipid panel and hemoglobin A1c w  BMI Counseling: Body mass index is 28 23 kg/m²  The BMI is above normal  Nutrition recommendations include encouraging healthy choices of fruits and vegetables, consuming healthier snacks, moderation in carbohydrate intake and reducing intake of cholesterol  Exercise recommendations include exercising 3-5 times per week  No pharmacotherapy was ordered  Reason for visit is   Chief Complaint   Patient presents with    Virtual Regular Visit    Virtual Regular Visit        Encounter provider Zeke Obrien MD    Provider located at [address] [place] [id number]      Recent Visits  No visits were found meeting these conditions     Showing recent visits within past 7 days and meeting all other requirements     Future Appointments  No visits were found meeting these conditions  Showing future appointments within next 150 days and meeting all other requirements        The patient was identified by name and date of birth  Kamran Merino was informed that this is a telemedicine visit and that the visit is being conducted through [address] [facility] and patient was informed that this is a secure, HIPAA-compliant platform  He agrees to proceed     My office door was closed  No one else was in the room  He acknowledged consent and understanding of privacy and security of the video platform  The patient has agreed to participate and understands they can discontinue the visit at any time  Patient is aware this is a billable service  Subjective  Kamran Merino is a 37 y o  male    Patient presents for follow-up  He reports overall improvement of migraine headaches  Patient also reports intermittent symptoms of sinus symptoms/Sinus headaches  He was tested negative for COVID back in March and was treated with antibiotic for sinus infection  Headache has resolved  No recurrences of ocular migraines  recent extensive workup included labs and negative MRI brain  Patient is concerned that his symptoms could be triggered by sinuses and seasonal allergies  recent blood work revealed mild elevation of cholesterol  Patient has started fish oil at 1200 mg daily  He admits to rather poor dietary habits and lack of exercise within past few months  Patient remains under ongoing care of [facility] for treatment of low back pain  Chronic myalgias  Extensive connective tissue disease workup was negative  He has experience symptoms of right lower extremity sciatica               Past Medical History:   Diagnosis Date    Anxiety     Arthritis        Past Surgical History:   Procedure Laterality Date    KNEE ARTHROSCOPY W/ ACL RECONSTRUCTION Right     KNEE SURGERY         Current Outpatient Medications   Medication Sig Dispense Refill    cetirizine (ZyrTEC) 10 mg tablet Take 1 tablet by mouth as needed        cyclobenzaprine (FLEXERIL) 10 mg tablet Take 1 tablet (10 mg total) by mouth 3 (three) times a day as needed for muscle spasms 90 tablet 1    diclofenac (VOLTAREN) 75 mg EC tablet Take 1 tablet twice a day as needed for back pain  Take with food 60 tablet 5    magnesium oxide (MAG-OX) 400 mg Take 1 tablet (400 mg total) by mouth 2 (two) times a day 180 tablet 1    Riboflavin 400 MG TABS Take 1  tablet daily 90 tablet 3     No current facility-administered medications for this visit  Allergies   Allergen Reactions    Latex Other (See Comments)     redness       Review of Systems   Constitutional: Negative  HENT: Positive for congestion and postnasal drip  Respiratory: Negative  Cardiovascular: Negative  Gastrointestinal: Negative  Musculoskeletal: Positive for arthralgias (Chronic), back pain ( chronic) and myalgias  Skin: Negative  Neurological:        Right lower extremity sciatica       Video Exam    Vitals:    05/06/21 1433   Weight: 97 1 kg (214 lb)   Height: 6' 1" (1 854 m)       Physical Exam  Vitals signs and nursing note reviewed  Constitutional:       Appearance: Normal appearance  Pulmonary:      Effort: Pulmonary effort is normal  No respiratory distress  Neurological:      General: No focal deficit present  Mental Status: He is alert  Psychiatric:         Mood and Affect: Mood normal          Behavior: Behavior normal          Thought Content: Thought content normal           I spent 24 minutes directly with the patient during this visit      VIRTUAL VISIT DISCLAIMER    Catherine Franco acknowledges that he has consented to an online visit or consultation   He understands that the online visit is based solely on information provided by him, and that, in the absence of a face-to-face physical evaluation by the physician, the diagnosis he receives is both limited and provisional in terms of accuracy and completeness  This is not intended to replace a full medical face-to-face evaluation by the physician  Gt Rhodes understands and accepts these terms

## 2021-05-10 ENCOUNTER — PROCEDURE VISIT (OUTPATIENT)
Dept: PAIN MEDICINE | Facility: CLINIC | Age: 41
End: 2021-05-10
Payer: COMMERCIAL

## 2021-05-10 VITALS
HEIGHT: 73 IN | DIASTOLIC BLOOD PRESSURE: 82 MMHG | HEART RATE: 68 BPM | RESPIRATION RATE: 16 BRPM | WEIGHT: 215 LBS | BODY MASS INDEX: 28.49 KG/M2 | SYSTOLIC BLOOD PRESSURE: 142 MMHG

## 2021-05-10 DIAGNOSIS — G89.4 CHRONIC PAIN SYNDROME: ICD-10-CM

## 2021-05-10 DIAGNOSIS — M54.9 MID BACK PAIN: ICD-10-CM

## 2021-05-10 DIAGNOSIS — M79.18 MYOFASCIAL PAIN SYNDROME: ICD-10-CM

## 2021-05-10 PROCEDURE — 3008F BODY MASS INDEX DOCD: CPT | Performed by: FAMILY MEDICINE

## 2021-05-10 PROCEDURE — 20552 NJX 1/MLT TRIGGER POINT 1/2: CPT | Performed by: ANESTHESIOLOGY

## 2021-05-10 RX ORDER — BUPIVACAINE HYDROCHLORIDE 2.5 MG/ML
10 INJECTION, SOLUTION EPIDURAL; INFILTRATION; INTRACAUDAL ONCE
Status: COMPLETED | OUTPATIENT
Start: 2021-05-10 | End: 2021-05-10

## 2021-05-10 RX ADMIN — BUPIVACAINE HYDROCHLORIDE 10 ML: 2.5 INJECTION, SOLUTION EPIDURAL; INFILTRATION; INTRACAUDAL at 15:30

## 2021-05-10 NOTE — PROGRESS NOTES
Inj Trigger Point Single/Multiple     Date/Time 5/10/2021 3:12 PM     Performed by  FAB Diehl     Authorized by FAB Diehl      Universal Protocol   Consent: Verbal consent obtained  Written consent obtained  Risks and benefits: risks, benefits and alternatives were discussed  Consent given by: patient  Timeout called at: 5/10/2021 3:18 PM   Patient understanding: patient states understanding of the procedure being performed  Patient consent: the patient's understanding of the procedure matches consent given  Procedure consent: procedure consent matches procedure scheduled  Site marked: the operative site was marked  Patient identity confirmed: verbally with patient        Local anesthesia used: yes     Anesthesia   Local anesthesia used: yes  Local Anesthetic: bupivacaine 0 25% without epinephrine     Sedation   Patient sedated: no        Specimen: no    Culture: no       Trigger Point Injection     Pre-operative diagnosis: myofascial pain    Post-operative diagnosis: myofascial pain    After risks and benefits were explained including bleeding, infection, worsening of the pain, damage to the area being injected, weakness, allergic reaction to medications, vascular injection, and nerve damage, signed consent was obtained  All questions were answered  The area of the trigger point was identified and the skin prepped three times with alcohol and the alcohol allowed to dry  Next, a 25 gauge 1 5 inch needle was placed in the area of the trigger point  Once reproduction of the pain was elicited and negative aspiration confirmed, the trigger point was injected and the needle removed  The patient did tolerate the procedure well and there were not complications  Medication used: 0 25%Bupivicaine  1 ml per site   2 sites injected     Trigger points injected: 2      Trigger point(s) location(s):  right trapezius X 1 and right rhomboid X1       Trigger point injections will be performed today  The injections were reviewed with the patient, and  they were made aware of the risks  Consent form was signed  The pain may temporarily  increase after the injections and the site may be ecchymotic  The patient was advised to use ice as needed for the pain  See separate procedure note  The patient was given a pain diary to rate the pain over the next 24 hours  They were instructed to return the diary to our office

## 2021-05-11 ENCOUNTER — TRANSCRIBE ORDERS (OUTPATIENT)
Dept: PAIN MEDICINE | Facility: CLINIC | Age: 41
End: 2021-05-11

## 2021-05-14 NOTE — ASSESSMENT & PLAN NOTE
·  Normal brain MRI February 2021  · Patient reports improvement of headaches      · Start preventative regimen with magnesium oxide 400 mg twice a day and vitamin B2 400 mg daily

## 2021-05-15 ENCOUNTER — IMMUNIZATIONS (OUTPATIENT)
Dept: FAMILY MEDICINE CLINIC | Facility: HOSPITAL | Age: 41
End: 2021-05-15

## 2021-05-15 DIAGNOSIS — Z23 ENCOUNTER FOR IMMUNIZATION: Primary | ICD-10-CM

## 2021-05-15 PROCEDURE — 91300 SARS-COV-2 / COVID-19 MRNA VACCINE (PFIZER-BIONTECH) 30 MCG: CPT

## 2021-05-15 PROCEDURE — 0001A SARS-COV-2 / COVID-19 MRNA VACCINE (PFIZER-BIONTECH) 30 MCG: CPT

## 2021-06-05 ENCOUNTER — IMMUNIZATIONS (OUTPATIENT)
Dept: FAMILY MEDICINE CLINIC | Facility: HOSPITAL | Age: 41
End: 2021-06-05

## 2021-06-05 DIAGNOSIS — Z23 ENCOUNTER FOR IMMUNIZATION: Primary | ICD-10-CM

## 2021-06-05 PROCEDURE — 91300 SARS-COV-2 / COVID-19 MRNA VACCINE (PFIZER-BIONTECH) 30 MCG: CPT

## 2021-06-05 PROCEDURE — 0002A SARS-COV-2 / COVID-19 MRNA VACCINE (PFIZER-BIONTECH) 30 MCG: CPT

## 2022-03-17 ENCOUNTER — APPOINTMENT (OUTPATIENT)
Dept: LAB | Facility: CLINIC | Age: 42
End: 2022-03-17
Payer: COMMERCIAL

## 2022-03-17 DIAGNOSIS — E78.2 MIXED HYPERLIPIDEMIA: ICD-10-CM

## 2022-03-17 LAB
EST. AVERAGE GLUCOSE BLD GHB EST-MCNC: 114 MG/DL
HBA1C MFR BLD: 5.6 %

## 2022-03-17 PROCEDURE — 83036 HEMOGLOBIN GLYCOSYLATED A1C: CPT

## 2022-03-17 PROCEDURE — 36415 COLL VENOUS BLD VENIPUNCTURE: CPT

## 2022-03-22 ENCOUNTER — OFFICE VISIT (OUTPATIENT)
Dept: FAMILY MEDICINE CLINIC | Facility: CLINIC | Age: 42
End: 2022-03-22
Payer: COMMERCIAL

## 2022-03-22 VITALS
DIASTOLIC BLOOD PRESSURE: 70 MMHG | TEMPERATURE: 98 F | HEIGHT: 73 IN | BODY MASS INDEX: 29.16 KG/M2 | RESPIRATION RATE: 16 BRPM | WEIGHT: 220 LBS | SYSTOLIC BLOOD PRESSURE: 120 MMHG | HEART RATE: 86 BPM | OXYGEN SATURATION: 98 %

## 2022-03-22 DIAGNOSIS — M79.18 MYOFASCIAL PAIN SYNDROME: ICD-10-CM

## 2022-03-22 DIAGNOSIS — F34.1 DYSTHYMIA: ICD-10-CM

## 2022-03-22 DIAGNOSIS — Z00.00 ENCOUNTER FOR WELLNESS EXAMINATION IN ADULT: Primary | ICD-10-CM

## 2022-03-22 DIAGNOSIS — M54.9 OTHER CHRONIC BACK PAIN: ICD-10-CM

## 2022-03-22 DIAGNOSIS — G89.29 OTHER CHRONIC BACK PAIN: ICD-10-CM

## 2022-03-22 DIAGNOSIS — E78.5 DYSLIPIDEMIA: ICD-10-CM

## 2022-03-22 DIAGNOSIS — G43.109 MIGRAINE WITH AURA AND WITHOUT STATUS MIGRAINOSUS, NOT INTRACTABLE: ICD-10-CM

## 2022-03-22 PROBLEM — H10.502 BLEPHAROCONJUNCTIVITIS OF LEFT EYE: Status: RESOLVED | Noted: 2018-12-07 | Resolved: 2022-03-22

## 2022-03-22 PROCEDURE — 99396 PREV VISIT EST AGE 40-64: CPT | Performed by: FAMILY MEDICINE

## 2022-03-22 RX ORDER — UREA 10 %
500 LOTION (ML) TOPICAL 2 TIMES DAILY
COMMUNITY
End: 2022-03-22

## 2022-03-22 RX ORDER — OMEGA-3/DHA/EPA/FISH OIL 910-1400MG
2 CAPSULE ORAL DAILY
COMMUNITY

## 2022-03-22 RX ORDER — DICLOFENAC SODIUM 75 MG/1
TABLET, DELAYED RELEASE ORAL
Qty: 60 TABLET | Refills: 2 | Status: SHIPPED | OUTPATIENT
Start: 2022-03-22 | End: 2022-06-23

## 2022-03-22 RX ORDER — VITAMIN B COMPLEX
1 CAPSULE ORAL DAILY
COMMUNITY

## 2022-03-22 RX ORDER — CHOLECALCIFEROL (VITAMIN D3) 125 MCG
2000 CAPSULE ORAL DAILY
COMMUNITY

## 2022-03-22 NOTE — Clinical Note
New patient consult  Chronic persistent back spasm  Extensive evaluation  No significant underlying disc disease, no known connective tissue disease  Patient has tried numerous anti-inflammatories and muscle relaxants including Robaxin, tizanidine and Flexeril  He has derived significant relief with p r n  use of Voltaren 75 mg daily/b i d  He does have some degree of chronic anhedonia and depression but seemingly chronic spasm and pain as well as seasonal depression seem to be his significant triggers  He works as a , 2nd shift  He is home in the morning, taking care of his 1month-old baby and 2 older daughters  Flexeril helps with sleep but not with pain  He is considering option of medical marijuana, but obviously there would be a significant financial burden  I am considering possible med options including baclofen (concerned about dose titration, t i d  use, sedating side effects), possibly Cymbalta or Elavil/Pamelor  I would appreciate your input      Thank you

## 2022-03-22 NOTE — PROGRESS NOTES
FAMILY PRACTICE OFFICE VISIT       NAME: Margareth Members  AGE: 40 y o  SEX: male       : 1980        MRN: [id number]        Assessment and Plan     1  Encounter for wellness examination in adult    2  Dyslipidemia  Assessment & Plan:  2 capsule of omega 3 fatty acids     Orders:  -     CBC and differential; Future  -     Comprehensive metabolic panel; Future  -     Lipid Panel with Direct LDL reflex; Future  -     TSH, 3rd generation; Future    3  Myofascial pain syndrome  Assessment & Plan:  Patient had extensive evaluation with negative connective tissue disease workup and previous follow-up with [facility]  Trials of numerous anti-inflammatories and muscle relaxants were unsuccessful  Patient is currently on regimen of Voltaren 75 mg b i d  which is quite effective, understandably both patient and I prefer to avoid daily use of NSAID due to long-term side effects  Patient uses Flexeril which helps with sleep but does not provide significant improvement of chronic muscle spasm  Previous trials of tizanidine and Robaxin were unsuccessful  Patient reports that daily pain is significantly affecting his day by day living  He has been considering option of medical marijuana and I will be supporting this approach  We discussed possible medication choices today including possible trial of baclofen and or Cymbalta  Patient also may be a good candidate for trial of amitriptyline or nortriptyline  I will contact our clinical pharmacist for advise and will finalize our treatment plans afterwards  Orders:  -     Ambulatory referral to Rheumatology; Future    4  Dysthymia    5  Other chronic back pain  -     diclofenac (VOLTAREN) 75 mg EC tablet; Take 1 tablet twice a day as needed for back pain  Take with food    6   Migraine with aura and without status migrainosus, not intractable  Assessment & Plan:  Normal brain MRI 2021  Headaches have imporved    Patient presents for annual well exam     Assessment and plan as outlined above  I will discuss medication options for chronic back spasm with our clinical pharmacist     Patient will proceed with blood work  Follow-up pending lab results and pharmacy consultation  Patient is scheduled for evaluation by [facility], he will be proceeding with vasectomy  BMI Counseling: Body mass index is 29 03 kg/m²  The BMI is above normal  Nutrition recommendations include encouraging healthy choices of fruits and vegetables, consuming healthier snacks, moderation in carbohydrate intake, reducing intake of saturated and trans fat and reducing intake of cholesterol  Exercise recommendations include exercising 3-5 times per week  No pharmacotherapy was ordered  Patient referred to PCP  Rationale for BMI follow-up plan is due to patient being overweight or obese  Depression Screening and Follow-up Plan: Patient's depression screening was positive with a PHQ-2 score of 6  Their PHQ-9 score was 12  Patient assessed for underlying major depression  Brief counseling provided and recommend additional follow-up/re-evaluation next office visit  Depression likely due to other medical condition  Will treat underlying condition  Patient advised to follow-up with PCP for further management  There are no Patient Instructions on file for this visit  No follow-ups on file  Discussed with the patient and all questioned fully answered  He will call me if any problems arise  M*Modal software was used to dictate this note  It may contain errors with dictating incorrect words/spelling  Please contact provider directly with any questions         Chief Complaint     Chief Complaint   Patient presents with    Physical Exam     yearly    Back Pain     Uppper/Lower    Vasectomy     Would like recommendation        History of Present Illness      Annual well exam     Patient reports that chronic migraine headaches have been well controlled lately  He is complaining of persistent back pain, patient had extensive evaluation in the past and was diagnosed with myofascial pain syndrome  He remains under care of chiropractor and has benefited from massage therapy  Unfortunately benefit from chiropractic care and massage therapy is very temporary  Patient states that diclofenac has been quite helpful as long as he takes a twice a day  He is trying to use this anti-inflammatory rather sparingly to avoid any GI side effects  Cyclobenzaprine that I prescribed in the past has helped with sleep but did not provide significant relief of chronic muscle tightness  Patient has tried Robaxin and tizanidine in the past with no significant improvement  He is no longer following up with [facility] as all treatment tools have been ineffective  Patient had extensive testing for connective tissue disease which was negative  Chronic pain, primarily is in trapezius area  Patient is a , physically demanding job  Patient reports to seasonal depression symptoms  Pain is a significant trigger  He denies any active suicidal homicidal ideation  Hyperlipidemia:  Patient remains on regimen of fatty 3 Omega acids and would like to proceed with blood work  Patient is a father of 3 girls  His wife and him have recently welcomed a new addition to the family, patient is home with his daughters during the day and works evening shift  He is understandably fatigued  Back Pain        Review of Systems   Review of Systems   Constitutional: Negative  HENT: Negative  Eyes: Negative  Respiratory: Negative  Cardiovascular: Negative  Gastrointestinal: Negative  Endocrine: Negative  Genitourinary: Negative  Musculoskeletal: Positive for back pain and myalgias  Allergic/Immunologic: Negative  Neurological: Negative  Hematological: Negative      Psychiatric/Behavioral: Positive for dysphoric mood          As per HPI       Active Problem List     Patient Active Problem List   Diagnosis    Impacted cerumen of left ear    Notalgia    Allergic rhinitis due to pollen    Chronic prostatitis    Gastroesophageal reflux disease without esophagitis    Mixed hyperlipidemia    Irritable bowel syndrome with diarrhea    Total bilirubin, elevated    Dysfunction of left eustachian tube    Lumbar spondylosis    Myofascial pain syndrome    Migraine with aura and without status migrainosus, not intractable    Dyslipidemia    Dysthymia       Past Medical History:  Past Medical History:   Diagnosis Date    Anxiety     Arthritis        Past Surgical History:  Past Surgical History:   Procedure Laterality Date    KNEE ARTHROSCOPY W/ ACL RECONSTRUCTION Right     KNEE SURGERY         Family History:  Family History   Problem Relation Age of Onset    Melanoma Mother        Social History:  Social History     Socioeconomic History    Marital status: /Civil Union     Spouse name: Not on file    Number of children: Not on file    Years of education: Not on file    Highest education level: Not on file   Occupational History    Not on file   Tobacco Use    Smoking status: Never Smoker    Smokeless tobacco: Never Used   Vaping Use    Vaping Use: Never used   Substance and Sexual Activity    Alcohol use: No     Comment: Occasional use per [employer]     Drug use: No    Sexual activity: Yes     Partners: Female   Other Topics Concern    Not on file   Social History Narrative    Not on file     Social Determinants of Health     Financial Resource Strain: Not on file   Food Insecurity: Not on file   Transportation Needs: Not on file   Physical Activity: Not on file   Stress: Not on file   Social Connections: Not on file   Intimate Partner Violence: Not on file   Housing Stability: Not on file         Objective     Vitals:    03/22/22 1224   BP: 120/70   BP Location: Right arm   Patient Position: Sitting   Cuff Size: Large   Pulse: 86   Resp: 16   Temp: 98 °F (36 7 °C)   TempSrc: Temporal   SpO2: 98%   Weight: 99 8 kg (220 lb)   Height: 6' 1" (1 854 m)       Wt Readings from Last 3 Encounters:   03/22/22 99 8 kg (220 lb)   05/10/21 97 5 kg (215 lb)   05/06/21 97 1 kg (214 lb)       Physical Exam  Vitals and nursing note reviewed  Constitutional:       General: He is not in acute distress  Appearance: Normal appearance  He is well-developed  He is not ill-appearing  HENT:      Head: Normocephalic and atraumatic  Eyes:      Conjunctiva/sclera: Conjunctivae normal    Neck:      Vascular: No carotid bruit  Cardiovascular:      Rate and Rhythm: Normal rate and regular rhythm  Heart sounds: Normal heart sounds  No murmur heard  Pulmonary:      Effort: Pulmonary effort is normal  No respiratory distress  Breath sounds: Normal breath sounds  No wheezing or rales  Abdominal:      General: Bowel sounds are normal  There is no distension or abdominal bruit  Palpations: Abdomen is soft  Tenderness: There is no abdominal tenderness  Musculoskeletal:         General: Normal range of motion  Cervical back: Neck supple  No rigidity  Right lower leg: No edema  Left lower leg: No edema  Comments: Reproducible muscle spasm with palpation of trapezius muscles bilaterally  Skin:     General: Skin is warm  Neurological:      General: No focal deficit present  Mental Status: He is alert and oriented to person, place, and time  Cranial Nerves: No cranial nerve deficit  Psychiatric:         Mood and Affect: Mood normal          Behavior: Behavior normal          Thought Content:  Thought content normal           Pertinent Laboratory/Diagnostic Studies:    Lab Results   Component Value Date    WBC 5 20 03/23/2022    HGB 15 1 03/23/2022    HCT 44 8 03/23/2022    MCV 86 03/23/2022     03/23/2022       No results found for: TSH    No results found for: CHOL  Lab Results   Component Value Date    TRIG 106 03/23/2022     Lab Results   Component Value Date    HDL 35 (L) 03/23/2022     Lab Results   Component Value Date    LDLCALC 199 (H) 03/23/2022     Lab Results   Component Value Date    HGBA1C 5 6 03/17/2022     Lab Results   Component Value Date    SODIUM 138 03/23/2022    K 3 8 03/23/2022     03/23/2022    CO2 30 03/23/2022    AGAP 3 (L) 03/23/2022    BUN 21 03/23/2022    CREATININE 1 11 03/23/2022    GLUC 87 04/25/2016    GLUF 99 03/23/2022    CALCIUM 9 0 03/23/2022    AST 39 03/23/2022    ALT 79 (H) 03/23/2022    ALKPHOS 58 03/23/2022    TP 8 0 03/23/2022    TBILI 1 22 (H) 03/23/2022    EGFR 82 03/23/2022       Orders Placed This Encounter   Procedures    CBC and differential    Comprehensive metabolic panel    Lipid Panel with Direct LDL reflex    TSH, 3rd generation    Ambulatory referral to Rheumatology       ALLERGIES:  Allergies   Allergen Reactions    Latex Other (See Comments)     redness       Current Medications     Current Outpatient Medications   Medication Sig Dispense Refill    b complex vitamins capsule Take 1 capsule by mouth daily      cetirizine (ZyrTEC) 10 mg tablet Take 1 tablet by mouth as needed        Cholecalciferol (Vitamin D3) 50 MCG (2000 UT) TABS Take 2,000 Units by mouth daily      cyclobenzaprine (FLEXERIL) 10 mg tablet Take 1 tablet (10 mg total) by mouth 3 (three) times a day as needed for muscle spasms 90 tablet 1    diclofenac (VOLTAREN) 75 mg EC tablet Take 1 tablet twice a day as needed for back pain   Take with food 60 tablet 2    Multiple Vitamins-Minerals (MULTIVITAMIN GUMMIES MENS PO) Take 1 each by mouth in the morning      NON FORMULARY Take 2 each by mouth in the morning Tumeric and Ginger with Broprenine      Omega-3 1400 MG CAPS Take 2 capsules by mouth in the morning      Riboflavin 400 MG TABS Take 1  tablet daily 90 tablet 3    magnesium oxide (MAG-OX) 400 mg Take 1 tablet (400 mg total) by mouth 2 (two) times a day 180 tablet 1     No current facility-administered medications for this visit         Medications Discontinued During This Encounter   Medication Reason    magnesium gluconate (MAGONATE) 500 mg tablet     diclofenac (VOLTAREN) 75 mg EC tablet Reorder       Health Maintenance     Health Maintenance   Topic Date Due    Hepatitis C Screening  Never done    Depression Follow-up Plan  Never done    HIV Screening  Never done    DTaP,Tdap,and Td Vaccines (1 - Tdap) Never done    BMI: Followup Plan  05/14/2022    COVID-19 Vaccine (3 - Booster for Pfizer series) 06/22/2022 (Originally 11/5/2021)    Influenza Vaccine (1) 06/30/2022 (Originally 9/1/2021)    Depression Screening  03/22/2023    BMI: Adult  03/22/2023    Annual Physical  03/22/2023    Pneumococcal Vaccine: Pediatrics (0 to 5 Years) and At-Risk Patients (6 to 59 Years)  Aged Out    HIB Vaccine  Aged Out    Hepatitis B Vaccine  Aged Out    IPV Vaccine  Aged Out    Hepatitis A Vaccine  Aged Out    Meningococcal ACWY Vaccine  Aged Out    HPV Vaccine  Aged [employer] History   Administered Date(s) Administered    COVID-19 PFIZER VACCINE 0 3 ML IM 05/15/2021, 06/05/2021       Ayush Tran MD

## 2022-03-23 ENCOUNTER — APPOINTMENT (OUTPATIENT)
Dept: LAB | Facility: CLINIC | Age: 42
End: 2022-03-23
Payer: COMMERCIAL

## 2022-03-23 DIAGNOSIS — E78.5 DYSLIPIDEMIA: ICD-10-CM

## 2022-03-23 LAB
ALBUMIN SERPL BCP-MCNC: 4.2 G/DL (ref 3.5–5)
ALP SERPL-CCNC: 58 U/L (ref 46–116)
ALT SERPL W P-5'-P-CCNC: 79 U/L (ref 12–78)
ANION GAP SERPL CALCULATED.3IONS-SCNC: 3 MMOL/L (ref 4–13)
AST SERPL W P-5'-P-CCNC: 39 U/L (ref 5–45)
BASOPHILS # BLD AUTO: 0.05 THOUSANDS/ΜL (ref 0–0.1)
BASOPHILS NFR BLD AUTO: 1 % (ref 0–1)
BILIRUB SERPL-MCNC: 1.22 MG/DL (ref 0.2–1)
BUN SERPL-MCNC: 21 MG/DL (ref 5–25)
CALCIUM SERPL-MCNC: 9 MG/DL (ref 8.3–10.1)
CHLORIDE SERPL-SCNC: 105 MMOL/L (ref 100–108)
CHOLEST SERPL-MCNC: 255 MG/DL
CO2 SERPL-SCNC: 30 MMOL/L (ref 21–32)
CREAT SERPL-MCNC: 1.11 MG/DL (ref 0.6–1.3)
EOSINOPHIL # BLD AUTO: 0.29 THOUSAND/ΜL (ref 0–0.61)
EOSINOPHIL NFR BLD AUTO: 6 % (ref 0–6)
ERYTHROCYTE [DISTWIDTH] IN BLOOD BY AUTOMATED COUNT: 12.6 % (ref 11.6–15.1)
GFR SERPL CREATININE-BSD FRML MDRD: 82 ML/MIN/1.73SQ M
GLUCOSE P FAST SERPL-MCNC: 99 MG/DL (ref 65–99)
HCT VFR BLD AUTO: 44.8 % (ref 36.5–49.3)
HDLC SERPL-MCNC: 35 MG/DL
HGB BLD-MCNC: 15.1 G/DL (ref 12–17)
IMM GRANULOCYTES # BLD AUTO: 0.01 THOUSAND/UL (ref 0–0.2)
IMM GRANULOCYTES NFR BLD AUTO: 0 % (ref 0–2)
LDLC SERPL CALC-MCNC: 199 MG/DL (ref 0–100)
LYMPHOCYTES # BLD AUTO: 1.9 THOUSANDS/ΜL (ref 0.6–4.47)
LYMPHOCYTES NFR BLD AUTO: 37 % (ref 14–44)
MCH RBC QN AUTO: 29 PG (ref 26.8–34.3)
MCHC RBC AUTO-ENTMCNC: 33.7 G/DL (ref 31.4–37.4)
MCV RBC AUTO: 86 FL (ref 82–98)
MONOCYTES # BLD AUTO: 0.5 THOUSAND/ΜL (ref 0.17–1.22)
MONOCYTES NFR BLD AUTO: 10 % (ref 4–12)
NEUTROPHILS # BLD AUTO: 2.45 THOUSANDS/ΜL (ref 1.85–7.62)
NEUTS SEG NFR BLD AUTO: 46 % (ref 43–75)
NRBC BLD AUTO-RTO: 0 /100 WBCS
PLATELET # BLD AUTO: 247 THOUSANDS/UL (ref 149–390)
PMV BLD AUTO: 9.5 FL (ref 8.9–12.7)
POTASSIUM SERPL-SCNC: 3.8 MMOL/L (ref 3.5–5.3)
PROT SERPL-MCNC: 8 G/DL (ref 6.4–8.2)
RBC # BLD AUTO: 5.21 MILLION/UL (ref 3.88–5.62)
SODIUM SERPL-SCNC: 138 MMOL/L (ref 136–145)
TRIGL SERPL-MCNC: 106 MG/DL
TSH SERPL DL<=0.05 MIU/L-ACNC: 1.96 UIU/ML (ref 0.36–3.74)
WBC # BLD AUTO: 5.2 THOUSAND/UL (ref 4.31–10.16)

## 2022-03-23 PROCEDURE — 85025 COMPLETE CBC W/AUTO DIFF WBC: CPT

## 2022-03-23 PROCEDURE — 36415 COLL VENOUS BLD VENIPUNCTURE: CPT

## 2022-03-23 PROCEDURE — 80053 COMPREHEN METABOLIC PANEL: CPT

## 2022-03-23 PROCEDURE — 84443 ASSAY THYROID STIM HORMONE: CPT

## 2022-03-23 PROCEDURE — 80061 LIPID PANEL: CPT

## 2022-03-26 PROBLEM — G43.109 MIGRAINE WITH AURA AND WITHOUT STATUS MIGRAINOSUS, NOT INTRACTABLE: Chronic | Status: ACTIVE | Noted: 2021-01-26

## 2022-03-26 PROBLEM — M79.18 MYOFASCIAL PAIN SYNDROME: Chronic | Status: ACTIVE | Noted: 2020-09-19

## 2022-03-26 PROBLEM — F34.1 DYSTHYMIA: Status: ACTIVE | Noted: 2022-03-26

## 2022-03-26 PROBLEM — E78.5 DYSLIPIDEMIA: Chronic | Status: ACTIVE | Noted: 2022-03-22

## 2022-03-26 PROBLEM — Z28.21 REFUSED INFLUENZA VACCINE: Status: RESOLVED | Noted: 2018-12-07 | Resolved: 2022-03-26

## 2022-03-26 NOTE — ASSESSMENT & PLAN NOTE
Patient had extensive evaluation with negative connective tissue disease workup and previous follow-up with [facility]  Trials of numerous anti-inflammatories and muscle relaxants were unsuccessful  Patient is currently on regimen of Voltaren 75 mg b i d  which is quite effective, understandably both patient and I prefer to avoid daily use of NSAID due to long-term side effects  Patient uses Flexeril which helps with sleep but does not provide significant improvement of chronic muscle spasm  Previous trials of tizanidine and Robaxin were unsuccessful  Patient reports that daily pain is significantly affecting his day by day living  He has been considering option of medical marijuana and I will be supporting this approach  We discussed possible medication choices today including possible trial of baclofen and or Cymbalta  Patient also may be a good candidate for trial of amitriptyline or nortriptyline  I will contact our clinical pharmacist for advise and will finalize our treatment plans afterwards

## 2022-03-28 DIAGNOSIS — R17 ELEVATED BILIRUBIN: Primary | ICD-10-CM

## 2022-03-30 ENCOUNTER — DOCUMENTATION (OUTPATIENT)
Dept: FAMILY MEDICINE CLINIC | Facility: CLINIC | Age: 42
End: 2022-03-30

## 2022-03-30 NOTE — PROGRESS NOTES
[facility]  Jeraldine Pallas, PharmD, BCPS, BCACP     Communication with patient: per chart review     Reason for documentation: per PCP consult    · Patient with chronic persistent back spasm   Extensive evaluation   No significant underlying disc disease, no known connective tissue disease  · He has tried numerous anti-inflammatories and muscle relaxants including methocarbamol, tizanidine and cycobenzaprine    · He has derived significant relief with PRN use of Voltaren 75 mg BID  · He does have some degree of chronic anhedonia and depression but seemingly chronic spasm and pain as well as seasonal depression seem to be his significant triggers  · Flexeril helps with sleep but not with pain   He is considering option of medical marijuana, but obviously there would be a significant financial burden  Please provide input on possible med options    Recommendations:     Interventions: Recommend provider please consider the following, if in agreeance  1  Encourage patient to pursue trigger point injections as these are first-line therapy and have provided relief in the past  Will likely need to schedule specialist office visit (i e , pain mgmt)  2  Preferred: Start low-dose duloxetine 30mg daily  May titrate to 60mg daily after 2 weeks, based on tolerance and patient response  3  Alternative: Consider amitriptyline  Non-preferred as patient reports using cyclobenzaprine primarily for sleep  Start low dose 10mg HS and titrate dose based on patient response in >/= 2 week intervals  Typical maintenance dose: 25-50 mg daily; Maximum dose: 75 mg daily    Follow-up:   Follow-up with pharmacist PRN  Next Rheumatology visit: 8/30/2022  Next PCP visit: not yet scheduled    Findings:   Patient previously seen by pain management for chronic pain syndrome, mid back pain, and myofascial pain syndrome  Has received trigger point injections with good response      Current pain meds:  Cyclobenzaprine 10 mg TID PRN  Diclofenac 75 mg BID PRN    Has been referred to Rheum by PCP  Review:    Trigger point injections are first-line therapy for patient's pain syndrome(s)  For medication therapy, NSAIDs and muscle relaxants are commonly used  No need to modify current therapy   Patient is not a candidate for PPI prophylaxis due to chronic NSAID use at this time (< 40years old, no history or risk factors for GI bleeding or ulcers)  As alternative therapy, duloxetine and amitriptyline have been shown to be beneficial     PCP: Stephen Leblanc MD    Reason For Outreach  Embedded Pharmacist    Demographics  Interaction Method: Chart Review (EMR)  Type of Intervention: New    Topic(s) Addressed  Opioids    Pain management    Intervention(s) Made    Pharmacologic:    -- Medication Initiation: duloxetine    Non-Pharmacologic:    -- Care coordination: Pain management    Tool(s) Used  Not Applicable    Time Spent:     Time Spent in Care Coordination: 50 minutes    Recommendations  Recipient: Provider  Outcome: Education Provided

## 2022-04-01 ENCOUNTER — TELEPHONE (OUTPATIENT)
Dept: FAMILY MEDICINE CLINIC | Facility: CLINIC | Age: 42
End: 2022-04-01

## 2022-04-01 DIAGNOSIS — E78.5 DYSLIPIDEMIA: ICD-10-CM

## 2022-04-01 DIAGNOSIS — M79.18 MYOFASCIAL PAIN SYNDROME: Primary | Chronic | ICD-10-CM

## 2022-04-01 RX ORDER — DULOXETIN HYDROCHLORIDE 30 MG/1
30 CAPSULE, DELAYED RELEASE ORAL DAILY
Qty: 30 CAPSULE | Refills: 1 | Status: SHIPPED | OUTPATIENT
Start: 2022-04-01 | End: 2022-04-24

## 2022-04-01 NOTE — TELEPHONE ENCOUNTER
I spoke with patient following consultation with clinical pharmacist   We reviewed results of recent blood work  Patient started with low-fat low-cholesterol diet  He is motivated to lose weight  Will repeat lipid panel and LFTs in 6 to 9 month  Patient is agreeable to try duloxetine 30 milligrams daily  He will contact me with an update in 4 to 6 weeks  We will consider increasing dose up to 60 milligrams as long as he is improving  If duloxetine is ineffective will consider alternative medications such as Elavil/Pamelor      Patient understands instructions and agrees

## 2022-04-08 ENCOUNTER — CONSULT (OUTPATIENT)
Dept: UROLOGY | Facility: CLINIC | Age: 42
End: 2022-04-08
Payer: COMMERCIAL

## 2022-04-08 VITALS
DIASTOLIC BLOOD PRESSURE: 72 MMHG | SYSTOLIC BLOOD PRESSURE: 122 MMHG | HEIGHT: 73 IN | BODY MASS INDEX: 27.96 KG/M2 | WEIGHT: 211 LBS

## 2022-04-08 DIAGNOSIS — Z30.2 ENCOUNTER FOR STERILIZATION: Primary | ICD-10-CM

## 2022-04-08 PROCEDURE — 99203 OFFICE O/P NEW LOW 30 MIN: CPT | Performed by: PHYSICIAN ASSISTANT

## 2022-04-08 RX ORDER — DIAZEPAM 5 MG/1
TABLET ORAL
Qty: 2 TABLET | Refills: 0 | Status: SHIPPED | OUTPATIENT
Start: 2022-04-08

## 2022-04-08 NOTE — PROGRESS NOTES
Referring Physician: Dasia Tineo MD  A copy of this consultation note was communicated to the referring physician  Diagnoses and all orders for this visit:    Encounter for sterilization  -     diazepam (VALIUM) 5 mg tablet; Take both tablets 1 hour prior to the procedure            Assessment and plan:       We had a long discussion regarding the options for birth control  I told the patient that vasectomy is considered to be a permanent surgical sterilization procedure  We spoke about other options including the possibility of vasectomy reversal at a later time  He understands that vasectomy confers no immunity to STDs  I also told him that according to our present knowledge, there is no causal relationship between vasectomy and subsequent development of prostate cancer  We spoke about the potential complications  The most common one in the short term is scrotal hematoma and infection, which rarely requires re-operation  Additionally, he can react to the anesthetic, develop scrotal swelling, have pain or skin bruising  We spoke about post procedure care to try to minimize this complication  I also asked him to refrain from aspirin products and alcohol prior to the procedure  The long-term complications include but are not limited to vasectomy failure by recanalization, chronic epididymal discomfort, pain, among other possibilities  I described to him how this procedure is normally performed in an office setting and he understands that if anesthesia is desired, this can be performed for him in an outpatient operative setting  Finally, he understands that following vasectomy, hell need to use other means of birth control until hes had semen analyses that demonstrate the absence of sperm  He understands it will be his responsibility to submit these semen specimens and call our office for the results   I told him again that recanalization is a small but real possibility, and if he is ever concerned about it he can submit another semen specimen for analysis  After discussing the risks, benefits, possible complications and alternatives, informed consents were obtained  Diazepam was prescribed, and review dosing, adverse effects and timing of the procedure  He will return in the near future for the procedure  Chief Complaint     Desire for vasectomy      History of Present Illness     Carlos Fishman is a 40 y o  male referred for evaluation of vasectomy  He has 3 biological children  He states that he and his partner have come to the mutual decision they do not desire any additional children  He has no prior past medical, past surgical, or past urologic history    Detailed Urologic History     - please refer to HPI    Review of Systems     Review of Systems   Constitutional: Negative for activity change and fatigue  HENT: Negative for congestion  Eyes: Negative for visual disturbance  Respiratory: Negative for shortness of breath and wheezing  Cardiovascular: Negative for chest pain and leg swelling  Gastrointestinal: Negative for abdominal pain  Endocrine: Negative for polyuria  Genitourinary: Negative for dysuria, flank pain, hematuria and urgency  Musculoskeletal: Negative for back pain  Allergic/Immunologic: Negative for immunocompromised state  Neurological: Negative for dizziness and numbness  Psychiatric/Behavioral: Negative for dysphoric mood  All other systems reviewed and are negative  Allergies     Allergies   Allergen Reactions    Latex Other (See Comments)     redness       Physical Exam     Physical Exam   Constitutional: He is oriented to person, place, and time  He appears well-developed and well-nourished  No distress  HENT:   Head: Normocephalic and atraumatic  Eyes: EOM are normal    Neck: Normal range of motion     Cardiovascular:   Negative lower extremity edema   Pulmonary/Chest: Effort normal and breath sounds normal    Abdominal: Soft  Genitourinary:   Genitourinary Comments: Vas deferens are palpable bilaterally  Musculoskeletal: Normal range of motion  Neurological: He is alert and oriented to person, place, and time  Skin: Skin is warm  Psychiatric: He has a normal mood and affect  His behavior is normal          Vital Signs  Vitals:    04/08/22 1302   BP: 122/72   Weight: 95 7 kg (211 lb)   Height: 6' 1" (1 854 m)         Current Medications       Current Outpatient Medications:     b complex vitamins capsule, Take 1 capsule by mouth daily, Disp: , Rfl:     cetirizine (ZyrTEC) 10 mg tablet, Take 1 tablet by mouth as needed  , Disp: , Rfl:     Cholecalciferol (Vitamin D3) 50 MCG (2000 UT) TABS, Take 2,000 Units by mouth daily, Disp: , Rfl:     cyclobenzaprine (FLEXERIL) 10 mg tablet, Take 1 tablet (10 mg total) by mouth 3 (three) times a day as needed for muscle spasms, Disp: 90 tablet, Rfl: 1    diclofenac (VOLTAREN) 75 mg EC tablet, Take 1 tablet twice a day as needed for back pain   Take with food, Disp: 60 tablet, Rfl: 2    DULoxetine (Cymbalta) 30 mg delayed release capsule, Take 1 capsule (30 mg total) by mouth daily, Disp: 30 capsule, Rfl: 1    magnesium oxide (MAG-OX) 400 mg, Take 1 tablet (400 mg total) by mouth 2 (two) times a day, Disp: 180 tablet, Rfl: 1    Multiple Vitamins-Minerals (MULTIVITAMIN GUMMIES MENS PO), Take 1 each by mouth in the morning, Disp: , Rfl:     NON FORMULARY, Take 2 each by mouth in the morning Tumeric and Ginger with Broprenine, Disp: , Rfl:     Omega-3 1400 MG CAPS, Take 2 capsules by mouth in the morning, Disp: , Rfl:     Riboflavin 400 MG TABS, Take 1  tablet daily, Disp: 90 tablet, Rfl: 3      Active Problems     Patient Active Problem List   Diagnosis    Impacted cerumen of left ear    Notalgia    Allergic rhinitis due to pollen    Chronic prostatitis    Gastroesophageal reflux disease without esophagitis    Mixed hyperlipidemia    Irritable bowel syndrome with diarrhea    Elevated bilirubin    Dysfunction of left eustachian tube    Lumbar spondylosis    Myofascial pain syndrome    Migraine with aura and without status migrainosus, not intractable    Dyslipidemia    Dysthymia         Past Medical History     Past Medical History:   Diagnosis Date    Anxiety     Arthritis          Surgical History     Past Surgical History:   Procedure Laterality Date    KNEE ARTHROSCOPY W/ ACL RECONSTRUCTION Right     KNEE SURGERY           Family History     Family History   Problem Relation Age of Onset    Melanoma Mother          Social History     Social History     Social History     Tobacco Use   Smoking Status Never Smoker   Smokeless Tobacco Never Used       Portions of the record may have been created with voice recognition software  Occasional wrong word or "sound a like" substitutions may have occurred due to the inherent limitations of voice recognition software  Read the chart carefully and recognize, using context, where substitutions have occurred

## 2022-04-24 DIAGNOSIS — M79.18 MYOFASCIAL PAIN SYNDROME: Chronic | ICD-10-CM

## 2022-04-24 RX ORDER — DULOXETIN HYDROCHLORIDE 30 MG/1
CAPSULE, DELAYED RELEASE ORAL
Qty: 30 CAPSULE | Refills: 1 | Status: SHIPPED | OUTPATIENT
Start: 2022-04-24 | End: 2022-04-29

## 2022-04-29 DIAGNOSIS — M79.18 MYOFASCIAL PAIN SYNDROME: Chronic | ICD-10-CM

## 2022-04-29 RX ORDER — DULOXETIN HYDROCHLORIDE 60 MG/1
60 CAPSULE, DELAYED RELEASE ORAL DAILY
Qty: 30 CAPSULE | Refills: 1 | Status: SHIPPED | OUTPATIENT
Start: 2022-04-29 | End: 2022-05-26

## 2022-05-12 ENCOUNTER — OFFICE VISIT (OUTPATIENT)
Dept: DERMATOLOGY | Facility: CLINIC | Age: 42
End: 2022-05-12
Payer: COMMERCIAL

## 2022-05-12 VITALS — WEIGHT: 198.41 LBS | BODY MASS INDEX: 26.18 KG/M2

## 2022-05-12 DIAGNOSIS — D22.5 MULTIPLE BENIGN MELANOCYTIC NEVI OF UPPER EXTREMITY, LOWER EXTREMITY, AND TRUNK: Primary | ICD-10-CM

## 2022-05-12 DIAGNOSIS — D22.60 MULTIPLE BENIGN MELANOCYTIC NEVI OF UPPER EXTREMITY, LOWER EXTREMITY, AND TRUNK: Primary | ICD-10-CM

## 2022-05-12 DIAGNOSIS — D22.70 MULTIPLE BENIGN MELANOCYTIC NEVI OF UPPER EXTREMITY, LOWER EXTREMITY, AND TRUNK: Primary | ICD-10-CM

## 2022-05-12 PROCEDURE — 99202 OFFICE O/P NEW SF 15 MIN: CPT | Performed by: STUDENT IN AN ORGANIZED HEALTH CARE EDUCATION/TRAINING PROGRAM

## 2022-05-12 NOTE — PATIENT INSTRUCTIONS
1  MELANOCYTIC NEVI ("Moles")        Assessment and Plan:  Based on a thorough discussion of this condition and the management approach to it (including a comprehensive discussion of the known risks, side effects and potential benefits of treatment), the patient (family) agrees to implement the following specific plan:  Recommend patient to continue monitoring skin for any abnormal changes like in size, shape, or color  Start using sun protective clothing like long sleeves, long pants a hat of 6 inches in diameter, sun glasses and an SPF 50+ daily when outside  Apply topically to entire body 3 times a day for sun protection may use Elta MD  Recommend patient to follow up yearly for a skin check,            Melanocytic Nevi  Melanocytic nevi ("moles") are tan or brown, raised or flat areas of the skin which have an increased number of melanocytes  Melanocytes are the cells in our body which make pigment and account for skin color  Some moles are present at birth (I e , "congenital nevi"), while others come up later in life (i e , "acquired nevi")  The sun can stimulate the body to make more moles  Sunburns are not the only thing that triggers more moles  Chronic sun exposure can do it too  Clinically distinguishing a healthy mole from melanoma may be difficult, even for experienced dermatologists  The "ABCDE's" of moles have been suggested as a means of helping to alert a person to a suspicious mole and the possible increased risk of melanoma  The suggestions for raising alert are as follows:    Asymmetry: Healthy moles tend to be symmetric, while melanomas are often asymmetric  Asymmetry means if you draw a line through the mole, the two halves do not match in color, size, shape, or surface texture  Asymmetry can be a result of rapid enlargement of a mole, the development of a raised area on a previously flat lesion, scaling, ulceration, bleeding or scabbing within the mole    Any mole that starts to demonstrate "asymmetry" should be examined promptly by a board certified dermatologist      Border: Healthy moles tend to have discrete, even borders  The border of a melanoma often blends into the normal skin and does not sharply delineate the mole from normal skin  Any mole that starts to demonstrate "uneven borders" should be examined promptly by a board certified dermatologist      Color: Healthy moles tend to be one color throughout  Melanomas tend to be made up of different colors ranging from dark black, blue, white, or red  Any mole that demonstrates a color change should be examined promptly by a board certified dermatologist      Diameter: Healthy moles tend to be smaller than 0 6 cm in size; an exception are "congenital nevi" that can be larger  Melanomas tend to grow and can often be greater than 0 6 cm (1/4 of an inch, or the size of a pencil eraser)  This is only a guideline, and many normal moles may be larger than 0 6 cm without being unhealthy  Any mole that starts to change in size (small to bigger or bigger to smaller) should be examined promptly by a board certified dermatologist      Evolving: Healthy moles tend to "stay the same "  Melanomas may often show signs of change or evolution such as a change in size, shape, color, or elevation  Any mole that starts to itch, bleed, crust, burn, hurt, or ulcerate or demonstrate a change or evolution should be examined promptly by a board certified dermatologist       Dysplastic Nevi  Dysplastic moles are moles that fit the ABCDE rules of melanoma but are not identified as melanomas when examined under the microscope  They may indicate an increased risk of melanoma in that person  If there is a family history of melanoma, most experts agree that the person may be at an increased risk for developing a melanoma  Experts still do not agree on what dysplastic moles mean in patients without a personal or family history of melanoma    Dysplastic moles are usually larger than common moles and have different colors within it with irregular borders  The appearance can be very similar to a melanoma  Biopsies of dysplastic moles may show abnormalities which are different from a regular mole  Melanoma  Malignant melanoma is a type of skin cancer that can be deadly if it spreads throughout the body  The incidence of melanoma in the [place] is growing faster than any other cancer  Melanoma usually grows near the surface of the skin for a period of time, and then begins to grow deeper into the skin  Once it grows deeper into the skin, the risk of spread to other organs greatly increases  Therefore, early detection and removal of a malignant melanoma may result in a better chance at a complete cure; removal after the tumor has spread may not be as effective, leading to worse clinical outcomes such as death  The true rate of nevus transformation into a melanoma is unknown  It has been estimated that the lifetime risk for any acquired melanocytic nevus on any 21year-old individual transforming into melanoma by age [de-identified] is 0 03% (1 in 3,164) for men and 0 009% (1 in 10,800) for women  The appearance of a "new mole" remains one of the most reliable methods for identifying a malignant melanoma  Occasionally, melanomas appear as rapidly growing, blue-black, dome-shaped bumps within a previous mole or previous area of normal skin  Other times, melanomas are suspected when a mole suddenly appears or changes  Itching, burning, or pain in a pigmented lesion should increase suspicion, but most patients with early melanoma have no skin discomfort whatsoever  Melanoma can occur anywhere on the skin, including areas that are difficult for self-examination  Many melanomas are first noticed by other family members  Suspicious-looking moles may be removed for microscopic examination         You may be able to prevent death from melanoma by doing two simple things:    Try to avoid unnecessary sun exposure and protect your skin when it is exposed to the sun  People who live near the equator, people who have intermittent exposures to large amounts of sun, and people who have had sunburns in childhood or adolescence have an increased risk for melanoma  Sun sense and vigilant sun protection may be keys to helping to prevent melanoma  We recommend wearing UPF-rated sun protective clothing and sunglasses whenever possible and applying a moisturizer-sunscreen combination product (SPF 50+) such as Neutrogena Daily Defense to sun exposed areas of skin at least three times a day  Have your moles regularly examined by a board certified dermatologist AND by yourself or a family member/friend at home  We recommend that you have your moles examined at least once a year by a board certified dermatologist   Use your birthday as an annual reminder to have your "Birthday Suit" (I e , your skin) examined; it is a nice birthday gift to yourself to know that your skin is healthy appearing! Additionally, at-home self examinations may be helpful for detecting a possible melanoma  Use the ABCDEs we discussed and check your moles once a month at home  Skin Cancer Prevention   WHAT YOU NEED TO KNOW:   Skin cancer is the most common type of cancer  Anyone can get skin cancer  Your risk is increased if you have light colored hair, skin, or eyes  Tanning, a sunburn, or a lot of sun exposure can also increase your risk  DISCHARGE INSTRUCTIONS:   General skin cancer prevention guidelines:   Avoid sun exposure between 10am and 4pm   The sun is most intense during the middle of the day  Sit in the shade if you are outside  Sit under an umbrella or sun shelter  Do not use tanning beds  Tanning beds are not safer than a tan directly from the sun  Get your skin checked at least once a year for cancer  Ask your healthcare provider if you have any questions      Use sunscreen correctly:   Use a broad spectrum sunscreen with at least SPF 30  Apply it 20 minutes before you go outside  Use sunscreen on cloudy days as well  Apply sunscreen at least every 2 hours and after you swim or sweat  Apply to your ears, scalp, back of your hands, and the tops of your feet  These areas are easily forgotten  Use a lip balm that contains at least SPF 30  Check the expiration date on your sunscreen  Sunscreens are not as effective after 3 years  Store sunscreen in a cool place  Sunscreen can  sooner if kept in a hot environment  Wear protective clothing outside:   Wear long-sleeved shirts and long pants or skirts  Choose dark colors and fabric with a tight weave  Some clothes have an ultraviolet protection factor (UPF) built in  Wear clothes with a UPF of 40 or higher  Wear a hat with a wide brim all the way around  The wide brim shades your face, ears, and the back of your neck  If possible, choose a dark-colored hat  Wear large-framed sunglasses  Sunglasses protect your eyes  Sunglasses decrease your risk for cataracts and other eye damage caused by the sun  Sunglasses that wrap around the sides of your face work best to keep out the sun  © Copyright [address]  Information is for End User's use only and may not be sold, redistributed or otherwise used for commercial purposes  All illustrations and images included in CareNotes® are the copyrighted property of [employer]  or [employer]   The above information is an  only  It is not intended as medical advice for individual conditions or treatments  Talk to your doctor, nurse or pharmacist before following any medical regimen to see if it is safe and effective for you

## 2022-05-12 NOTE — PROGRESS NOTES
[facility] Note     Patient Name: Manuel Esposito  Encounter Date: 5/12/2022     Have you been cared for by a [facility] Dermatologist in the last 3 years and, if so, which one? No    · Have you traveled outside of the [address] [place] [address] in the past 3 months or outside of the [facility] area in the last 2 weeks? No     May we call your Preferred Phone number to discuss your specific medical information? Yes     May we leave a detailed message that includes your specific medical information? Yes      Today's Chief Concerns:   Concern #1:  Full body skin check       Past Medical History:  Have you personally ever had or currently have any of the following? · Skin cancer (such as Melanoma, Basal Cell Carcinoma, Squamous Cell Carcinoma? (If Yes, please provide more detail)- No  · Eczema: No  · Psoriasis: No  · HIV/AIDS: No  · Hepatitis B or C: No  · Tuberculosis: No  · Systemic Immunosuppression such as Diabetes, Biologic or Immunotherapy, Chemotherapy, Organ Transplantation, Bone Marrow Transplantation (If YES, please provide more detail): No  · Radiation Treatment (If YES, please provide more detail): No  · Any other major medical conditions/concerns? (If Yes, which types)- No   · History of atypical nevus  Social History:     What is/was your primary occupation? Employed      What are your hobbies/past-times? n/a    Family History:  Have any of your "first degree relatives" (parent, brother, sister, or child) had any of the following       · Skin cancer such as Melanoma or Merkel Cell Carcinoma or Pancreatic Cancer? YES, mom has history of occular melanoma   · Eczema, Asthma, Hay Fever or Seasonal Allergies: YES, asthma for dad and seasonal allergies   · Psoriasis or Psoriatic Arthritis: No  · Do any other medical conditions seem to run in your family? If Yes, what condition and which relatives?   YES, father has history of squamous and basal cell carcinoma Current Medications:   (please update all dermatological medications before printing patient's AVS!)      Current Outpatient Medications:     b complex vitamins capsule, Take 1 capsule by mouth daily, Disp: , Rfl:     cetirizine (ZyrTEC) 10 mg tablet, Take 1 tablet by mouth as needed  , Disp: , Rfl:     Cholecalciferol (Vitamin D3) 50 MCG (2000 UT) TABS, Take 2,000 Units by mouth daily, Disp: , Rfl:     cyclobenzaprine (FLEXERIL) 10 mg tablet, Take 1 tablet (10 mg total) by mouth 3 (three) times a day as needed for muscle spasms, Disp: 90 tablet, Rfl: 1    diclofenac (VOLTAREN) 75 mg EC tablet, Take 1 tablet twice a day as needed for back pain  Take with food, Disp: 60 tablet, Rfl: 2    DULoxetine (CYMBALTA) 60 mg delayed release capsule, Take 1 capsule (60 mg total) by mouth daily, Disp: 30 capsule, Rfl: 1    magnesium oxide (MAG-OX) 400 mg, Take 1 tablet (400 mg total) by mouth 2 (two) times a day, Disp: 180 tablet, Rfl: 1    Multiple Vitamins-Minerals (MULTIVITAMIN GUMMIES MENS PO), Take 1 each by mouth in the morning, Disp: , Rfl:     NON FORMULARY, Take 2 each by mouth in the morning Tumeric and Ginger with Broprenine, Disp: , Rfl:     Omega-3 1400 MG CAPS, Take 2 capsules by mouth in the morning, Disp: , Rfl:     Riboflavin 400 MG TABS, Take 1  tablet daily, Disp: 90 tablet, Rfl: 3    diazepam (VALIUM) 5 mg tablet, Take both tablets 1 hour prior to the procedure (Patient not taking: Reported on 5/12/2022), Disp: 2 tablet, Rfl: 0      Review of Systems:  Have you recently had or currently have any of the following? If YES, what are you doing for the problem?     · Fever, chills or unintended weight loss: No  · Sudden loss or change in your vision: YES, eye floaters   · Nausea, vomiting or blood in your stool: No  · Painful or swollen joints: No  · Wheezing or cough: No  · Changing mole or non-healing wound: YES, New ones appearance   · Nosebleeds: YES, during the winter   · Excessive sweating: No  · Easy or prolonged bleeding? No  · Over the last 2 weeks, how often have you been bothered by the following problems? · Taking little interest or pleasure in doing things: 1 - Not at All  · Feeling down, depressed, or hopeless: 1 - Not at All  · Rapid heartbeat with epinephrine:  No    · FEMALES ONLY:    · Are you pregnant or planning to become pregnant? No  · Are you currently or planning to be nursing or breast feeding? No    · Any known allergies? Allergies   Allergen Reactions    Latex Other (See Comments)     redness         Physical Exam:     Was a chaperone (Derm Clinical Assistant) present throughout the entire Physical Exam? Yes     Did the Dermatology Team specifically  the patient on the importance of a Full Skin Exam to be sure that nothing is missed clinically?  Yes}  o Did the patient ultimately request or accept a Full Skin Exam?  Yes  o Did the patient specifically refuse to have the areas "under-the-bra" examined by the Dermatologist? No  o Did the patient specifically refuse to have the areas "under-the-underwear" examined by the Dermatologist? No    CONSTITUTIONAL:   Vitals:    05/12/22 0850   Weight: 90 kg (198 lb 6 6 oz)         PSYCH: Normal mood and affect  EYES: Normal conjunctiva  ENT: Normal lips and oral mucosa  CARDIOVASCULAR: No edema  RESPIRATORY: Normal respirations  HEME/LYMPH/IMMUNO:  No regional lymphadenopathy except as noted below in "ASSESSMENT AND PLAN BY DIAGNOSIS"    SKIN:  FULL ORGAN SYSTEM EXAM   Hair, Scalp, Ears, Face Normal except as noted below in Assessment   Neck, Cervical Chain Nodes Normal except as noted below in Assessment   Right Arm/Hand/Fingers Normal except as noted below in Assessment   Left Arm/Hand/Fingers Normal except as noted below in Assessment   Chest/Breasts/Axillae Viewed areas Normal except as noted below in Assessment   Abdomen, Umbilicus Normal except as noted below in Assessment   Back/Spine Normal except as noted below in Assessment   Groin/Genitalia/Buttocks NOT EXAMINED--PT DEFERRED   Right Leg, Foot, Toes Normal except as noted below in Assessment   Left Leg, Foot, Toes Normal except as noted below in Assessment        Assessment and Plan by Diagnosis:    History of Present Condition:     Duration:  How long has this been an issue for you?    o  years    Location Affected:  Where on the body is this affecting you? o  trunk and extremities    Quality:  Is there any bleeding, pain, itch, burning/irritation, or redness associated with the skin lesion? o  asymptomatic    Severity:  Describe any bleeding, pain, itch, burning/irritation, or redness on a scale of 1 to 10 (with 10 being the worst)  o  0   Timing:  Does this condition seem to be there pretty constantly or do you notice it more at specific times throughout the day?    o  not applicable    Context:  Have you ever noticed that this condition seems to be associated with specific activities you do?    o  no   Modifying Factors:    o Anything that seems to make the condition worse?    -  no  o What have you tried to do to make the condition better?    -  no   Associated Signs and Symptoms:  Does this skin lesion seem to be associated with any of the following:  o  None       1  MELANOCYTIC NEVI ("Moles")    Physical Exam:   Anatomic Location Affected:   Head, trunk and extremities    Morphological Description:  Scattered, 1-4mm round to ovoid, symmetrical-appearing, even bordered, skin colored to dark brown macules/papules, mostly in sun-exposed areas   Pertinent Positives:   Pertinent Negatives: Additional History of Present Condition:  Present for a while      Assessment and Plan:  Based on a thorough discussion of this condition and the management approach to it (including a comprehensive discussion of the known risks, side effects and potential benefits of treatment), the patient (family) agrees to implement the following specific plan:   Recommend patient to continue monitoring skin for any abnormal changes like in size, shape, or color   Start using sun protective clothing like long sleeves, long pants a hat of 6 inches in diameter, sun glasses and an SPF 50+ daily when outside  Apply topically to entire body 3 times a day for sun protection may use Elta MD  Recommend patient to follow up yearly for a skin check,            Melanocytic Nevi  Melanocytic nevi ("moles") are tan or brown, raised or flat areas of the skin which have an increased number of melanocytes  Melanocytes are the cells in our body which make pigment and account for skin color  Some moles are present at birth (I e , "congenital nevi"), while others come up later in life (i e , "acquired nevi")  The sun can stimulate the body to make more moles  Sunburns are not the only thing that triggers more moles  Chronic sun exposure can do it too  Clinically distinguishing a healthy mole from melanoma may be difficult, even for experienced dermatologists  The "ABCDE's" of moles have been suggested as a means of helping to alert a person to a suspicious mole and the possible increased risk of melanoma  The suggestions for raising alert are as follows:    Asymmetry: Healthy moles tend to be symmetric, while melanomas are often asymmetric  Asymmetry means if you draw a line through the mole, the two halves do not match in color, size, shape, or surface texture  Asymmetry can be a result of rapid enlargement of a mole, the development of a raised area on a previously flat lesion, scaling, ulceration, bleeding or scabbing within the mole  Any mole that starts to demonstrate "asymmetry" should be examined promptly by a board certified dermatologist      Border: Healthy moles tend to have discrete, even borders  The border of a melanoma often blends into the normal skin and does not sharply delineate the mole from normal skin    Any mole that starts to demonstrate "uneven borders" should be examined promptly by a board certified dermatologist      Color: Healthy moles tend to be one color throughout  Melanomas tend to be made up of different colors ranging from dark black, blue, white, or red  Any mole that demonstrates a color change should be examined promptly by a board certified dermatologist      Diameter: Healthy moles tend to be smaller than 0 6 cm in size; an exception are "congenital nevi" that can be larger  Melanomas tend to grow and can often be greater than 0 6 cm (1/4 of an inch, or the size of a pencil eraser)  This is only a guideline, and many normal moles may be larger than 0 6 cm without being unhealthy  Any mole that starts to change in size (small to bigger or bigger to smaller) should be examined promptly by a board certified dermatologist      Evolving: Healthy moles tend to "stay the same "  Melanomas may often show signs of change or evolution such as a change in size, shape, color, or elevation  Any mole that starts to itch, bleed, crust, burn, hurt, or ulcerate or demonstrate a change or evolution should be examined promptly by a board certified dermatologist       Dysplastic Nevi  Dysplastic moles are moles that fit the ABCDE rules of melanoma but are not identified as melanomas when examined under the microscope  They may indicate an increased risk of melanoma in that person  If there is a family history of melanoma, most experts agree that the person may be at an increased risk for developing a melanoma  Experts still do not agree on what dysplastic moles mean in patients without a personal or family history of melanoma  Dysplastic moles are usually larger than common moles and have different colors within it with irregular borders  The appearance can be very similar to a melanoma  Biopsies of dysplastic moles may show abnormalities which are different from a regular mole        Melanoma  Malignant melanoma is a type of skin cancer that can be deadly if it spreads throughout the body  The incidence of melanoma in the [place] is growing faster than any other cancer  Melanoma usually grows near the surface of the skin for a period of time, and then begins to grow deeper into the skin  Once it grows deeper into the skin, the risk of spread to other organs greatly increases  Therefore, early detection and removal of a malignant melanoma may result in a better chance at a complete cure; removal after the tumor has spread may not be as effective, leading to worse clinical outcomes such as death  The true rate of nevus transformation into a melanoma is unknown  It has been estimated that the lifetime risk for any acquired melanocytic nevus on any 21year-old individual transforming into melanoma by age [de-identified] is 0 03% (1 in 3,164) for men and 0 009% (1 in 10,800) for women  The appearance of a "new mole" remains one of the most reliable methods for identifying a malignant melanoma  Occasionally, melanomas appear as rapidly growing, blue-black, dome-shaped bumps within a previous mole or previous area of normal skin  Other times, melanomas are suspected when a mole suddenly appears or changes  Itching, burning, or pain in a pigmented lesion should increase suspicion, but most patients with early melanoma have no skin discomfort whatsoever  Melanoma can occur anywhere on the skin, including areas that are difficult for self-examination  Many melanomas are first noticed by other family members  Suspicious-looking moles may be removed for microscopic examination  You may be able to prevent death from melanoma by doing two simple things:    1  Try to avoid unnecessary sun exposure and protect your skin when it is exposed to the sun  People who live near the equator, people who have intermittent exposures to large amounts of sun, and people who have had sunburns in childhood or adolescence have an increased risk for melanoma   Sun sense and vigilant sun protection may be keys to helping to prevent melanoma  We recommend wearing UPF-rated sun protective clothing and sunglasses whenever possible and applying a moisturizer-sunscreen combination product (SPF 50+) such as Neutrogena Daily Defense to sun exposed areas of skin at least three times a day  2  Have your moles regularly examined by a board certified dermatologist AND by yourself or a family member/friend at home  We recommend that you have your moles examined at least once a year by a board certified dermatologist   Use your birthday as an annual reminder to have your "Birthday Suit" (I e , your skin) examined; it is a nice birthday gift to yourself to know that your skin is healthy appearing! Additionally, at-home self examinations may be helpful for detecting a possible melanoma  Use the ABCDEs we discussed and check your moles once a month at home          Scribe Attestation    I,:  Porsha Mclaughlin MA am acting as a scribe while in the presence of the attending physician :       I,:  Tiago Demarco MD personally performed the services described in this documentation    as scribed in my presence :

## 2022-05-26 DIAGNOSIS — M79.18 MYOFASCIAL PAIN SYNDROME: Chronic | ICD-10-CM

## 2022-05-26 RX ORDER — DULOXETIN HYDROCHLORIDE 60 MG/1
CAPSULE, DELAYED RELEASE ORAL
Qty: 30 CAPSULE | Refills: 1 | Status: SHIPPED | OUTPATIENT
Start: 2022-05-26 | End: 2022-06-16 | Stop reason: SDUPTHER

## 2022-06-16 ENCOUNTER — OFFICE VISIT (OUTPATIENT)
Dept: FAMILY MEDICINE CLINIC | Facility: CLINIC | Age: 42
End: 2022-06-16
Payer: COMMERCIAL

## 2022-06-16 VITALS
BODY MASS INDEX: 26.11 KG/M2 | DIASTOLIC BLOOD PRESSURE: 60 MMHG | HEIGHT: 73 IN | WEIGHT: 197 LBS | HEART RATE: 59 BPM | TEMPERATURE: 97.8 F | OXYGEN SATURATION: 97 % | SYSTOLIC BLOOD PRESSURE: 100 MMHG | RESPIRATION RATE: 16 BRPM

## 2022-06-16 DIAGNOSIS — M79.18 MYOFASCIAL PAIN SYNDROME: ICD-10-CM

## 2022-06-16 DIAGNOSIS — E78.2 MIXED HYPERLIPIDEMIA: Primary | ICD-10-CM

## 2022-06-16 PROCEDURE — 99213 OFFICE O/P EST LOW 20 MIN: CPT | Performed by: FAMILY MEDICINE

## 2022-06-16 RX ORDER — DULOXETIN HYDROCHLORIDE 60 MG/1
60 CAPSULE, DELAYED RELEASE ORAL DAILY
Qty: 90 CAPSULE | Refills: 3 | Status: SHIPPED | OUTPATIENT
Start: 2022-06-16

## 2022-06-16 RX ORDER — CYCLOBENZAPRINE HCL 10 MG
10 TABLET ORAL
Qty: 90 TABLET | Refills: 3 | Status: SHIPPED | OUTPATIENT
Start: 2022-06-16

## 2022-06-16 RX ORDER — CYCLOBENZAPRINE HCL 10 MG
10 TABLET ORAL
Qty: 90 TABLET | Refills: 3 | Status: SHIPPED | OUTPATIENT
Start: 2022-06-16 | End: 2022-06-16 | Stop reason: SDUPTHER

## 2022-06-16 NOTE — PROGRESS NOTES
FAMILY PRACTICE OFFICE VISIT       NAME: Vaishnavi Kunz  AGE: 40 y o  SEX: male       : 1980        MRN: [id number]        Assessment and Plan     1  Mixed hyperlipidemia  Assessment & Plan:  Most recent blood work indicated high cholesterol  Patient started with regular exercise and low-fat low-cholesterol diet  He has lost 23 lb within past few months and I commended him on this significant achievement  He is motivated to continue with healthy lifestyle  Pending blood work to reassess lipid panel      2  Myofascial pain syndrome  Assessment & Plan:  Significant improvement on Cymbalta 60 mg daily  Patient uses diclofenac 75 mg daily or b i d  on an as-needed basis for low back pain flares  He takes Flexeril as needed at night  Continue active lifestyle, stretching exercises      Orders:  -     cyclobenzaprine (FLEXERIL) 10 mg tablet; Take 1 tablet (10 mg total) by mouth daily at bedtime as needed for muscle spasms  -     DULoxetine (CYMBALTA) 60 mg delayed release capsule; Take 1 capsule (60 mg total) by mouth daily         There are no Patient Instructions on file for this visit  No follow-ups on file  Discussed with the patient and all questioned fully answered  He will call me if any problems arise  M*Modal software was used to dictate this note  It may contain errors with dictating incorrect words/spelling  Please contact provider directly with any questions  Chief Complaint     Chief Complaint   Patient presents with    Follow-up     Myofascial pain syndrome  Feeling better with medication        History of Present Illness     Presents for follow-up of chronic myofascial pain syndrome and hyperlipidemia  He has been following healthy low-fat low-cholesterol and low-carbohydrate diet  So far he has lost 23 lb and is very proud of his achievement  Patient states that Cymbalta has made a huge difference in his chronic pain/low back pain symptoms      He tolerates current medication well at 60 mg daily  He is still experiencing intermittent muscle spasms but they are much more tolerable  Patient is using p r n  diclofenac strictly p r n , rarely  He has Flexeril on hand but found that anti-inflammatory works better  No adverse side effects on Cymbalta  Review of Systems   Review of Systems   Constitutional: Negative  HENT: Negative  Respiratory: Negative  Cardiovascular: Negative  Gastrointestinal: Negative  Musculoskeletal: Positive for back pain and myalgias  Neurological: Negative  Psychiatric/Behavioral: Negative          Active Problem List     Patient Active Problem List   Diagnosis    Impacted cerumen of left ear    Notalgia    Allergic rhinitis due to pollen    Chronic prostatitis    Gastroesophageal reflux disease without esophagitis    Mixed hyperlipidemia    Irritable bowel syndrome with diarrhea    Elevated bilirubin    Dysfunction of left eustachian tube    Lumbar spondylosis    Myofascial pain syndrome    Migraine with aura and without status migrainosus, not intractable    Dyslipidemia    Dysthymia       Past Medical History:  Past Medical History:   Diagnosis Date    Anxiety     Arthritis        Past Surgical History:  Past Surgical History:   Procedure Laterality Date    KNEE ARTHROSCOPY W/ ACL RECONSTRUCTION Right     KNEE SURGERY         Family History:  Family History   Problem Relation Age of Onset    Melanoma Mother        Social History:  Social History     Socioeconomic History    Marital status: /Civil Union     Spouse name: Not on file    Number of children: Not on file    Years of education: Not on file    Highest education level: Not on file   Occupational History    Not on file   Tobacco Use    Smoking status: Never Smoker    Smokeless tobacco: Never Used   Vaping Use    Vaping Use: Never used   Substance and Sexual Activity    Alcohol use: No     Comment: Occasional use per [employer]     Drug use: No    Sexual activity: Yes     Partners: Female   Other Topics Concern    Not on file   Social History Narrative    Not on file     Social Determinants of Health     Financial Resource Strain: Not on file   Food Insecurity: Not on file   Transportation Needs: Not on file   Physical Activity: Not on file   Stress: Not on file   Social Connections: Not on file   Intimate Partner Violence: Not on file   Housing Stability: Not on file         Objective     Vitals:    06/16/22 1059   BP: 100/60   BP Location: Left arm   Patient Position: Sitting   Cuff Size: Standard   Pulse: 59   Resp: 16   Temp: 97 8 °F (36 6 °C)   TempSrc: Temporal   SpO2: 97%   Weight: 89 4 kg (197 lb)   Height: 6' 1" (1 854 m)       Wt Readings from Last 3 Encounters:   06/16/22 89 4 kg (197 lb)   05/12/22 90 kg (198 lb 6 6 oz)   04/08/22 95 7 kg (211 lb)       Physical Exam  Vitals and nursing note reviewed  Constitutional:       General: He is not in acute distress  Appearance: Normal appearance  Neurological:      General: No focal deficit present  Mental Status: He is alert and oriented to person, place, and time  Psychiatric:         Mood and Affect: Mood normal          Behavior: Behavior normal          Thought Content:  Thought content normal           Pertinent Laboratory/Diagnostic Studies:    Lab Results   Component Value Date    WBC 5 20 03/23/2022    HGB 15 1 03/23/2022    HCT 44 8 03/23/2022    MCV 86 03/23/2022     03/23/2022       No results found for: TSH    No results found for: CHOL  Lab Results   Component Value Date    TRIG 106 03/23/2022     Lab Results   Component Value Date    HDL 35 (L) 03/23/2022     Lab Results   Component Value Date    LDLCALC 199 (H) 03/23/2022     Lab Results   Component Value Date    HGBA1C 5 6 03/17/2022     Lab Results   Component Value Date    SODIUM 138 03/23/2022    K 3 8 03/23/2022     03/23/2022    CO2 30 03/23/2022    AGAP 3 (L) 03/23/2022 BUN 21 03/23/2022    CREATININE 1 11 03/23/2022    GLUC 87 04/25/2016    GLUF 99 03/23/2022    CALCIUM 9 0 03/23/2022    AST 39 03/23/2022    ALT 79 (H) 03/23/2022    ALKPHOS 58 03/23/2022    TP 8 0 03/23/2022    TBILI 1 22 (H) 03/23/2022    EGFR 82 03/23/2022       No orders of the defined types were placed in this encounter  ALLERGIES:  Allergies   Allergen Reactions    Latex Other (See Comments)     redness       Current Medications     Current Outpatient Medications   Medication Sig Dispense Refill    b complex vitamins capsule Take 1 capsule by mouth daily      cetirizine (ZyrTEC) 10 mg tablet Take 1 tablet by mouth as needed        Cholecalciferol (Vitamin D3) 50 MCG (2000 UT) TABS Take 2,000 Units by mouth daily      cyclobenzaprine (FLEXERIL) 10 mg tablet Take 1 tablet (10 mg total) by mouth daily at bedtime as needed for muscle spasms 90 tablet 3    diclofenac (VOLTAREN) 75 mg EC tablet Take 1 tablet twice a day as needed for back pain  Take with food 60 tablet 2    DULoxetine (CYMBALTA) 60 mg delayed release capsule Take 1 capsule (60 mg total) by mouth daily 90 capsule 3    magnesium oxide (MAG-OX) 400 mg Take 1 tablet (400 mg total) by mouth 2 (two) times a day 180 tablet 1    Multiple Vitamins-Minerals (MULTIVITAMIN GUMMIES MENS PO) Take 1 each by mouth in the morning      NON FORMULARY Take 2 each by mouth in the morning Tumeric and Ginger with Broprenine      Omega-3 1400 MG CAPS Take 2 capsules by mouth in the morning      Riboflavin 400 MG TABS Take 1  tablet daily 90 tablet 3    diazepam (VALIUM) 5 mg tablet Take both tablets 1 hour prior to the procedure (Patient not taking: No sig reported) 2 tablet 0     No current facility-administered medications for this visit         Medications Discontinued During This Encounter   Medication Reason    cyclobenzaprine (FLEXERIL) 10 mg tablet     cyclobenzaprine (FLEXERIL) 10 mg tablet Reorder    DULoxetine (CYMBALTA) 60 mg delayed release capsule Reorder       Health Maintenance     Health Maintenance   Topic Date Due    Hepatitis C Screening  Never done    HIV Screening  Never done    DTaP,Tdap,and Td Vaccines (1 - Tdap) Never done    Influenza Vaccine (Season Ended) 09/01/2022    COVID-19 Vaccine (3 - Booster for [place] series) 06/22/2022 (Originally 11/5/2021)    Annual Physical  03/22/2023    Depression Remission PHQ  03/22/2023    BMI: Followup Plan  03/26/2023    BMI: Adult  06/16/2023    Pneumococcal Vaccine: Pediatrics (0 to 5 Years) and At-Risk Patients (6 to 59 Years)  Aged Out    HIB Vaccine  Aged Out    Hepatitis B Vaccine  Aged Out    IPV Vaccine  Aged Out    Hepatitis A Vaccine  Aged Out    Meningococcal ACWY Vaccine  Aged Out    HPV Vaccine  Aged [employer] History   Administered Date(s) Administered    COVID-19 PFIZER VACCINE 0 3 ML IM 05/15/2021, 06/05/2021       Stephen Leblanc MD

## 2022-06-20 NOTE — ASSESSMENT & PLAN NOTE
Most recent blood work indicated high cholesterol  Patient started with regular exercise and low-fat low-cholesterol diet  He has lost 23 lb within past few months and I commended him on this significant achievement  He is motivated to continue with healthy lifestyle    Pending blood work to reassess lipid panel

## 2022-06-20 NOTE — ASSESSMENT & PLAN NOTE
Significant improvement on Cymbalta 60 mg daily  Patient uses diclofenac 75 mg daily or b i d  on an as-needed basis for low back pain flares  He takes Flexeril as needed at night    Continue active lifestyle, stretching exercises

## 2022-06-23 DIAGNOSIS — G89.29 OTHER CHRONIC BACK PAIN: ICD-10-CM

## 2022-06-23 DIAGNOSIS — M54.9 OTHER CHRONIC BACK PAIN: ICD-10-CM

## 2022-06-23 RX ORDER — DICLOFENAC SODIUM 75 MG/1
TABLET, DELAYED RELEASE ORAL
Qty: 60 TABLET | Refills: 2 | Status: SHIPPED | OUTPATIENT
Start: 2022-06-23

## 2022-06-29 ENCOUNTER — APPOINTMENT (OUTPATIENT)
Dept: LAB | Facility: CLINIC | Age: 42
End: 2022-06-29
Payer: COMMERCIAL

## 2022-06-29 DIAGNOSIS — E78.5 DYSLIPIDEMIA: ICD-10-CM

## 2022-06-29 LAB
ALBUMIN SERPL BCP-MCNC: 4.1 G/DL (ref 3.5–5)
ALP SERPL-CCNC: 58 U/L (ref 46–116)
ALT SERPL W P-5'-P-CCNC: 31 U/L (ref 12–78)
AST SERPL W P-5'-P-CCNC: 20 U/L (ref 5–45)
BILIRUB DIRECT SERPL-MCNC: 0.36 MG/DL (ref 0–0.2)
BILIRUB SERPL-MCNC: 1.42 MG/DL (ref 0.2–1)
CHOLEST SERPL-MCNC: 199 MG/DL
HDLC SERPL-MCNC: 39 MG/DL
LDLC SERPL CALC-MCNC: 149 MG/DL (ref 0–100)
PROT SERPL-MCNC: 8.3 G/DL (ref 6.4–8.2)
TRIGL SERPL-MCNC: 56 MG/DL

## 2022-06-29 PROCEDURE — 80061 LIPID PANEL: CPT

## 2022-06-29 PROCEDURE — 36415 COLL VENOUS BLD VENIPUNCTURE: CPT

## 2022-06-29 PROCEDURE — 80076 HEPATIC FUNCTION PANEL: CPT

## 2022-07-02 DIAGNOSIS — E78.5 DYSLIPIDEMIA: Primary | ICD-10-CM

## 2022-07-20 NOTE — PROGRESS NOTES
Procedures      No Scalpel Vasectomy Procedure Note    Indications: 40 y o   y o  male desiring permanent sterilization    Pre-operative Diagnosis: Undesired fertility    Post-operative Diagnosis: Undesired fertility    Anesthesia: Lidocaine 2% without epinephrine      Procedure Details       Risks and benefits of vasectomy were previously discussed  Informed consent was obtained at his prior office visit  The patient had taken Alprazolam as prescribed for anxiolysis and he had showered the morning of the procedure and clipped excess pubic hair  Music of the patient's choosing was also played for additional anxiolysis  The patient was placed in the supine position  His genitalia were prepped and draped in the usual sterile fashion  The left vas deferens was grasped and presented to the area of interest on the left hemiscrotum  Next, 2% lidocaine was used to anesthetize the skin, subcutaneous tissue, and left vas deferens  The left vas deferens was grasped and presented into the surgical field with a vas clamp  A #15 blade was used to make a longitudinal incision in the sheath of the vas deferens  The vas itself was then dissected free from the surrounding tissue  Clamps were placed proximally and distally and a 1 cm segment of the vas deferens was excised  Silk ties were applied and the exposed ends were fulgurated as was the lumen of the vas  Hemostasis was excellent  The vas was returned to its natural anatomic position after ensuring meticulous hemostasis  A single stitch of 3-0 chromic was placed through the skin and dartos to reapproximate the defect in a horizontal mattress fashion  The right vas deferens was then grasped and presented to the area of interest on the right hemiscrotum  Next, 2% lidocaine was used to anesthetize the skin, subcutaneous tissue, and right vas deferens  The right vas deferens was grasped and presented into the surgical field with a vas clamp     A #15 blade was used to make a longitudinal incision in the sheath of the vas deferens  The vas itself was then dissected free from the surrounding tissue  Clamps were placed proximally and distally and a 1 cm segment of the vas deferens was excised  Silk ties were applied and the exposed ends were fulgurated as was the lumen of the vas  Hemostasis was excellent  The vas was returned to its natural anatomic position after ensuring meticulous hemostasis  A single stitch of 3-0 chromic was placed through the skin and dartos to reapproximate the defect in a horizontal mattress fashion  Specimen:   1  Right vas deferens  2  Left vas deferens    Condition: Stable    Complications: none    Plan:      He did very well with the procedure today  Postprocedural instructions were provided  He will use a 2nd form of birth control until postprocedure sterility is confirmed, he will follow-up p r n  Vasectomy     Date/Time 7/22/2022 2:16 PM     Performed by  Ambrose Benavides MD     Authorized by Ambrose Benavides MD      Universal Protocol   Consent: Verbal consent obtained  Written consent obtained  Risks and benefits: risks, benefits and alternatives were discussed  Consent given by: patient  Patient understanding: patient states understanding of the procedure being performed  Patient consent: the patient's understanding of the procedure matches consent given  Procedure consent: procedure consent matches procedure scheduled  Relevant documents: relevant documents present and verified  Test results: test results available and properly labeled  Site marked: the operative site was not marked  Radiology Images displayed and confirmed   If images not available, report reviewed: imaging studies available  Required items: required blood products, implants, devices, and special equipment available  Patient identity confirmed: verbally with patient and provided demographic data        Local anesthesia used: yes     Anesthesia   Local anesthesia used: yes  Local Anesthetic: lidocaine 2% without epinephrine     Sedation   Patient sedated: yes  Sedation type: anxiolysis(And benzodiazepine)        Specimen: yes    Culture: no   Procedure Details   Procedure Notes: Procedures      No Scalpel Vasectomy Procedure Note    Indications: 40 y o   y o  male desiring permanent sterilization    Pre-operative Diagnosis: Undesired fertility    Post-operative Diagnosis: Undesired fertility    Anesthesia: Lidocaine 2% without epinephrine      Procedure Details       Risks and benefits of vasectomy were previously discussed  Informed consent was obtained at his prior office visit  The patient had taken Alprazolam as prescribed for anxiolysis and he had showered the morning of the procedure and clipped excess pubic hair  Music of the patient's choosing was also played for additional anxiolysis  The patient was placed in the supine position  His genitalia were prepped and draped in the usual sterile fashion  The left vas deferens was grasped and presented to the area of interest on the left hemiscrotum  Next, 2% lidocaine was used to anesthetize the skin, subcutaneous tissue, and left vas deferens  The left vas deferens was grasped and presented into the surgical field with a vas clamp  A #15 blade was used to make a longitudinal incision in the sheath of the vas deferens  The vas itself was then dissected free from the surrounding tissue  Clamps were placed proximally and distally and a 1 cm segment of the vas deferens was excised  Silk ties were applied and the exposed ends were fulgurated as was the lumen of the vas  Hemostasis was excellent  The vas was returned to its natural anatomic position after ensuring meticulous hemostasis  A single stitch of 3-0 chromic was placed through the skin and dartos to reapproximate the defect in a horizontal mattress fashion      The right vas deferens was then grasped and presented to the area of interest on the right hemiscrotum  Next, 2% lidocaine was used to anesthetize the skin, subcutaneous tissue, and right vas deferens  The right vas deferens was grasped and presented into the surgical field with a vas clamp  A #15 blade was used to make a longitudinal incision in the sheath of the vas deferens  The vas itself was then dissected free from the surrounding tissue  Clamps were placed proximally and distally and a 1 cm segment of the vas deferens was excised  Silk ties were applied and the exposed ends were fulgurated as was the lumen of the vas  Hemostasis was excellent  The vas was returned to its natural anatomic position after ensuring meticulous hemostasis  A single stitch of 3-0 chromic was placed through the skin and dartos to reapproximate the defect in a horizontal mattress fashion  Specimen:   1  Right vas deferens  2  Left vas deferens    Condition: Stable    Complications: none    Plan:      He did very well with the procedure today  Postprocedural instructions were provided  He will use a 2nd form of birth control until postprocedure sterility is confirmed, he will follow-up p r n    Patient Transportation: confirmed  Patient tolerance: patient tolerated the procedure well with no immediate complications

## 2022-07-22 ENCOUNTER — PROCEDURE VISIT (OUTPATIENT)
Dept: UROLOGY | Facility: CLINIC | Age: 42
End: 2022-07-22
Payer: COMMERCIAL

## 2022-07-22 VITALS
BODY MASS INDEX: 26.21 KG/M2 | SYSTOLIC BLOOD PRESSURE: 132 MMHG | HEART RATE: 77 BPM | WEIGHT: 197.8 LBS | OXYGEN SATURATION: 97 % | RESPIRATION RATE: 16 BRPM | DIASTOLIC BLOOD PRESSURE: 80 MMHG | HEIGHT: 73 IN

## 2022-07-22 DIAGNOSIS — Z30.2 ENCOUNTER FOR VASECTOMY: Primary | ICD-10-CM

## 2022-07-22 PROCEDURE — 55250 REMOVAL OF SPERM DUCT(S): CPT | Performed by: UROLOGY

## 2022-07-22 PROCEDURE — 88302 TISSUE EXAM BY PATHOLOGIST: CPT | Performed by: PATHOLOGY

## 2022-07-23 NOTE — TELEPHONE ENCOUNTER
Patient was called and scheduled with Nigel Wheatley The patient is a 52y Female complaining of swelling of legs.

## 2022-08-30 ENCOUNTER — CONSULT (OUTPATIENT)
Dept: RHEUMATOLOGY | Facility: CLINIC | Age: 42
End: 2022-08-30
Payer: COMMERCIAL

## 2022-08-30 VITALS
BODY MASS INDEX: 26.11 KG/M2 | HEIGHT: 73 IN | WEIGHT: 197 LBS | SYSTOLIC BLOOD PRESSURE: 110 MMHG | DIASTOLIC BLOOD PRESSURE: 80 MMHG

## 2022-08-30 DIAGNOSIS — M79.18 MYOFASCIAL PAIN SYNDROME: ICD-10-CM

## 2022-08-30 PROCEDURE — 99203 OFFICE O/P NEW LOW 30 MIN: CPT | Performed by: INTERNAL MEDICINE

## 2022-08-30 NOTE — PROGRESS NOTES
Rheumatology Consult   Susen Lombard 40 y o  male 1980    DATE: 8/30/2022    Reason for Consult: myalgias    Assessment and Plan:  Upper back muscle pain, chronic, much improved with duloxetine  He has had trigger point injections in the past without improvement in his symptoms  Increase cardio/aerobic activity  Topical NSAIDs/analgesics PRN joint/muscle pain  Oral NSAIDs PRN joint pain  Check CRP, CK  F/u in 6 mos  or sooner if necessary  History of Present Illness:  Susen Lombard is a 40 y o  male Here for initial eval   He has had chronic upper back pain s/p trigger point injections by pain mgmt  without improvement in his symptoms  He takes diclofenac and Flexeril PRN muscle/joint pain  He has had significant improvement in his symptoms after beginning duloxetine  Review of Systems  Review of Systems   Constitutional: Negative for fatigue, fever and unexpected weight change  HENT: Negative for mouth sores  Respiratory: Negative for cough and shortness of breath  Cardiovascular: Negative for chest pain and leg swelling  Gastrointestinal: Negative for abdominal pain, constipation and diarrhea  Musculoskeletal: Positive for back pain and myalgias  Negative for arthralgias and joint swelling  Skin: Negative for color change and rash  Neurological: Negative for weakness  Hematological: Negative for adenopathy  Psychiatric/Behavioral: Negative for sleep disturbance  Allergies  Allergies   Allergen Reactions    Latex Other (See Comments)     redness       Current Medications      Past Medical History  Past Medical History:   Diagnosis Date    Anxiety     Arthritis        Past Surgical History  Past Surgical History:   Procedure Laterality Date    KNEE ARTHROSCOPY W/ ACL RECONSTRUCTION Right     KNEE SURGERY         Family History  No known autoimmune or inflammatory diseases in the family     Family History   Problem Relation Age of Onset   Esperanza Boone Melanoma Mother Social History  Occupation:   Social History     Substance and Sexual Activity   Alcohol Use No    Comment: Occasional use per [employer]      Social History     Substance and Sexual Activity   Drug Use No     Social History     Tobacco Use   Smoking Status Never Smoker   Smokeless Tobacco Never Used        Objective:  /80   Ht 6' 1" (1 854 m)   Wt 89 4 kg (197 lb)   BMI 25 99 kg/m²     Physical Exam  Constitutional:       General: He is not in acute distress  HENT:      Head: Normocephalic and atraumatic  Eyes:      Conjunctiva/sclera: Conjunctivae normal    Cardiovascular:      Rate and Rhythm: Normal rate and regular rhythm  Heart sounds: S1 normal and S2 normal      No friction rub  Pulmonary:      Effort: Pulmonary effort is normal  No respiratory distress  Breath sounds: Normal breath sounds  No wheezing, rhonchi or rales  Musculoskeletal:      Cervical back: Neck supple  Skin:     General: Skin is warm and dry  Coloration: Skin is not pale  Findings: No rash  Neurological:      Mental Status: He is alert  Mental status is at baseline  Psychiatric:         Mood and Affect: Mood normal          Behavior: Behavior normal             Lab Results: I have personally reviewed pertinent reports        CBC:   , Chemistry Profile:       Invalid input(s): ALBUMIN, Coagulation Studies:   , Cardiac Studies:   , Additional Labs:   , iSTAT CHEM 8:       Invalid input(s): POTASSIUMIS, ABG:   , Toxicology:   , Last A1C/Lipid Panel/Thyroid Panel:   Lab Results   Component Value Date    HGBA1C 5 6 03/17/2022    HGBA1C 5 5 01/23/2021    TRIG 56 06/29/2022    TRIG 106 03/23/2022    HDL 39 (L) 06/29/2022    HDL 35 (L) 03/23/2022    LDLCALC 149 (H) 06/29/2022    LDLCALC 199 (H) 03/23/2022    [id number] 1 960 03/23/2022     Lab Results   Component Value Date    CRP <3 0 03/09/2019    EILEEN Negative 09/29/2018    RF Negative 09/29/2018           Invalid input(s): [facility]: I have personally reviewed pertinent films in PACS

## 2022-08-30 NOTE — PATIENT INSTRUCTIONS
Continue to eat healthy and exercise  Continue the duloxetine for your muscle pain  Try to increase your cardio/aerobic activity as this will help with your muscle pain as well  I ordered some non-fasting blood work for you to have done at your convenience  You can also try over the counter Aspercreme with lidocaine and apply it to your painful muscles and joints 2-3 times per day

## 2023-01-28 ENCOUNTER — APPOINTMENT (OUTPATIENT)
Dept: LAB | Facility: CLINIC | Age: 43
End: 2023-01-28

## 2023-01-28 DIAGNOSIS — M79.18 MYOFASCIAL PAIN SYNDROME: ICD-10-CM

## 2023-01-28 DIAGNOSIS — E78.5 DYSLIPIDEMIA: ICD-10-CM

## 2023-01-28 LAB
ALBUMIN SERPL BCP-MCNC: 4 G/DL (ref 3.5–5)
ALP SERPL-CCNC: 60 U/L (ref 46–116)
ALT SERPL W P-5'-P-CCNC: 27 U/L (ref 12–78)
AST SERPL W P-5'-P-CCNC: 21 U/L (ref 5–45)
BILIRUB DIRECT SERPL-MCNC: 0.13 MG/DL (ref 0–0.2)
BILIRUB SERPL-MCNC: 0.88 MG/DL (ref 0.2–1)
CHOLEST SERPL-MCNC: 196 MG/DL
CK SERPL-CCNC: 137 U/L (ref 39–308)
CRP SERPL QL: <3 MG/L
HDLC SERPL-MCNC: 35 MG/DL
LDLC SERPL CALC-MCNC: 150 MG/DL (ref 0–100)
PROT SERPL-MCNC: 7.8 G/DL (ref 6.4–8.4)
TRIGL SERPL-MCNC: 57 MG/DL
TSH SERPL DL<=0.05 MIU/L-ACNC: 1.09 UIU/ML (ref 0.45–4.5)

## 2023-02-01 ENCOUNTER — TELEPHONE (OUTPATIENT)
Dept: FAMILY MEDICINE CLINIC | Facility: CLINIC | Age: 43
End: 2023-02-01

## 2023-02-01 NOTE — TELEPHONE ENCOUNTER
Please contact patient  I reviewed results of recent blood work  I sent him a [employer] message regarding cholesterol  Please kindly ask him to review and reply    Thank you

## 2023-02-14 DIAGNOSIS — E78.5 DYSLIPIDEMIA: Primary | Chronic | ICD-10-CM

## 2023-08-01 ENCOUNTER — OFFICE VISIT (OUTPATIENT)
Dept: FAMILY MEDICINE CLINIC | Facility: CLINIC | Age: 43
End: 2023-08-01
Payer: COMMERCIAL

## 2023-08-01 VITALS
SYSTOLIC BLOOD PRESSURE: 110 MMHG | HEART RATE: 71 BPM | RESPIRATION RATE: 16 BRPM | OXYGEN SATURATION: 98 % | TEMPERATURE: 97.6 F | WEIGHT: 206 LBS | HEIGHT: 73 IN | BODY MASS INDEX: 27.3 KG/M2 | DIASTOLIC BLOOD PRESSURE: 80 MMHG

## 2023-08-01 DIAGNOSIS — M79.18 MYOFASCIAL PAIN SYNDROME: Chronic | ICD-10-CM

## 2023-08-01 DIAGNOSIS — R17 ELEVATED BILIRUBIN: ICD-10-CM

## 2023-08-01 DIAGNOSIS — Z13.1 ENCOUNTER FOR SCREENING FOR DIABETES MELLITUS: ICD-10-CM

## 2023-08-01 DIAGNOSIS — Z00.00 ENCOUNTER FOR WELLNESS EXAMINATION IN ADULT: Primary | ICD-10-CM

## 2023-08-01 DIAGNOSIS — E78.2 MIXED HYPERLIPIDEMIA: ICD-10-CM

## 2023-08-01 PROBLEM — E78.5 DYSLIPIDEMIA: Chronic | Status: RESOLVED | Noted: 2022-03-22 | Resolved: 2023-08-01

## 2023-08-01 PROCEDURE — 99396 PREV VISIT EST AGE 40-64: CPT | Performed by: FAMILY MEDICINE

## 2023-08-01 NOTE — PROGRESS NOTES
Name: Can Harry      : 1980      MRN: [id number]  Encounter Provider: Serene Fairchild MD  Encounter Date: 2023   Encounter department: [address] [place] [address]     1. Encounter for wellness examination in adult    2. Mixed hyperlipidemia  Assessment & Plan:  Red yeast rice, fish oil capsules. Low-fat low-cholesterol diet. Strong family history of hyperlipidemia-both parents and CAD-maternal grandfather    Orders:  -     CBC; Future  -     Comprehensive metabolic panel; Future  -     Lipid Panel with Direct LDL reflex; Future  -     TSH, 3rd generation; Future    3. Encounter for screening for diabetes mellitus  -     Hemoglobin A1C; Future    4. Myofascial pain syndrome  Assessment & Plan:  Symptoms are well controlled on duloxetine 60 mg daily. Patient takes Flexeril and diclofenac as needed. He was evaluated by by [facility], inflammatory markers were normal.  PRN follow-up      5. Elevated bilirubin  Assessment & Plan:  History of asymptomatic bilirubin elevation with normal LFTs. We will reassess bilirubin level. If still elevated-consider right upper quadrant ultrasound (rule out Gilbert's syndrome). Annual well exam. Proceed with blood work. Discussed importance of healthy lifestyle, low-cholesterol diet, worklife balance. Follow-up pending labs, updates, annually and as needed. Subjective     Patient presents for annual well exam.  He feels well and stably. We reviewed consultation by rheumatology. PRN follow-up was advised. Patient has been managing chronic musculoskeletal pain on regimen of duloxetine and as needed Flexeril and as needed diclofenac. He has been taking red yeast rice and fatty 3 omega acids and will be proceeding with blood work shortly. Patient is concerned that his cholesterol could be elevated as he just returned from a couple week vacation.     She reports strong family history of hyperlipidemia-both parents. History of CAD-maternal grandfather  but not in any other family members. History of elevated bilirubin with normal LFTs. Patient did not proceed with right upper quadrant ultrasound as bilirubin was normal during most recent lab testing. Review of Systems   Constitutional: Negative. HENT: Negative. Eyes: Negative. Respiratory: Negative. Cardiovascular: Negative. Gastrointestinal: Negative. Endocrine: Negative. Genitourinary: Negative. Musculoskeletal: Positive for back pain and myalgias. Allergic/Immunologic: Negative. Neurological: Negative. Hematological: Negative. Psychiatric/Behavioral: Negative.         Past Medical History:   Diagnosis Date   • Anxiety    • Arthritis      Past Surgical History:   Procedure Laterality Date   • KNEE ARTHROSCOPY W/ ACL RECONSTRUCTION Right    • KNEE SURGERY       Family History   Problem Relation Age of Onset   • Hyperlipidemia Mother    • Melanoma Mother    • Hyperlipidemia Father    • Coronary artery disease Maternal Grandfather      Social History     Socioeconomic History   • Marital status: /Civil Union     Spouse name: None   • Number of children: None   • Years of education: None   • Highest education level: None   Occupational History   • None   Tobacco Use   • Smoking status: Never   • Smokeless tobacco: Never   Vaping Use   • Vaping Use: Never used   Substance and Sexual Activity   • Alcohol use: No     Comment: Occasional use per [employer]    • Drug use: No   • Sexual activity: Yes     Partners: Female   Other Topics Concern   • None   Social History Narrative   • None     Social Determinants of Health     Financial Resource Strain: Not on file   Food Insecurity: Not on file   Transportation Needs: Not on file   Physical Activity: Not on file   Stress: Not on file   Social Connections: Not on file   Intimate Partner Violence: Not on file   Housing Stability: Not on file     Current Outpatient Medications on File Prior to Visit   Medication Sig   • b complex vitamins capsule Take 1 capsule by mouth daily   • cetirizine (ZyrTEC) 10 mg tablet Take 1 tablet by mouth as needed     • cyclobenzaprine (FLEXERIL) 10 mg tablet Take 1 tablet (10 mg total) by mouth daily at bedtime as needed for muscle spasms   • diclofenac (VOLTAREN) 75 mg EC tablet TAKE 1 TABLET TWICE A DAY AS NEEDED FOR BACK PAIN. TAKE WITH FOOD   • DULoxetine (CYMBALTA) 60 mg delayed release capsule Take 1 capsule (60 mg total) by mouth daily   • magnesium oxide (MAG-OX) 400 mg Take 1 tablet (400 mg total) by mouth 2 (two) times a day   • Multiple Vitamins-Minerals (MULTIVITAMIN GUMMIES MENS PO) Take 1 each by mouth in the morning   • NON FORMULARY Take 2 each by mouth in the morning Tumeric and Ginger with Broprenine   • Omega-3 1400 MG CAPS Take 2 capsules by mouth in the morning   • Red Yeast Rice Extract (RED YEAST RICE PO) Take by mouth daily 2 caps daily   • Riboflavin 400 MG TABS Take 1  tablet daily   • Cholecalciferol (Vitamin D3) 50 MCG (2000 UT) TABS Take 2,000 Units by mouth daily (Patient not taking: Reported on 8/30/2022)     Allergies   Allergen Reactions   • Latex Other (See Comments)     redness     Immunization History   Administered Date(s) Administered   • COVID-19 PFIZER VACCINE 0.3 ML IM 05/15/2021, 06/05/2021       Objective     /80   Pulse 71   Temp 97.6 °F (36.4 °C) (Temporal)   Resp 16   Ht 6' 1" (1.854 m)   Wt 93.4 kg (206 lb)   SpO2 98%   BMI 27.18 kg/m²     Physical Exam  Vitals and nursing note reviewed. Constitutional:       General: He is not in acute distress. Appearance: Normal appearance. He is well-developed. He is not ill-appearing. HENT:      Head: Normocephalic and atraumatic. Eyes:      Conjunctiva/sclera: Conjunctivae normal.   Neck:      Vascular: No carotid bruit. Cardiovascular:      Rate and Rhythm: Normal rate and regular rhythm.       Heart sounds: Normal heart sounds. No murmur heard. Pulmonary:      Effort: Pulmonary effort is normal. No respiratory distress. Breath sounds: Normal breath sounds. No wheezing or rales. Abdominal:      General: Bowel sounds are normal. There is no distension or abdominal bruit. Musculoskeletal:         General: Normal range of motion. Cervical back: Neck supple. No rigidity. Right lower leg: No edema. Left lower leg: No edema. Skin:     General: Skin is warm. Neurological:      General: No focal deficit present. Mental Status: He is alert and oriented to person, place, and time. Cranial Nerves: No cranial nerve deficit. Psychiatric:         Mood and Affect: Mood normal.         Behavior: Behavior normal.         Thought Content:  Thought content normal.       Tarun Burton MD

## 2023-08-02 ENCOUNTER — APPOINTMENT (OUTPATIENT)
Dept: LAB | Facility: CLINIC | Age: 43
End: 2023-08-02
Payer: COMMERCIAL

## 2023-08-02 DIAGNOSIS — E78.5 DYSLIPIDEMIA: ICD-10-CM

## 2023-08-02 DIAGNOSIS — Z13.1 ENCOUNTER FOR SCREENING FOR DIABETES MELLITUS: ICD-10-CM

## 2023-08-02 DIAGNOSIS — E78.2 MIXED HYPERLIPIDEMIA: ICD-10-CM

## 2023-08-02 LAB
ALBUMIN SERPL BCP-MCNC: 4.1 G/DL (ref 3.5–5)
ALP SERPL-CCNC: 57 U/L (ref 46–116)
ALT SERPL W P-5'-P-CCNC: 34 U/L (ref 12–78)
ANION GAP SERPL CALCULATED.3IONS-SCNC: 2 MMOL/L
AST SERPL W P-5'-P-CCNC: 28 U/L (ref 5–45)
BILIRUB DIRECT SERPL-MCNC: 0.22 MG/DL (ref 0–0.2)
BILIRUB SERPL-MCNC: 1.32 MG/DL (ref 0.2–1)
BUN SERPL-MCNC: 16 MG/DL (ref 5–25)
CALCIUM SERPL-MCNC: 9.2 MG/DL (ref 8.3–10.1)
CHLORIDE SERPL-SCNC: 107 MMOL/L (ref 96–108)
CHOLEST SERPL-MCNC: 241 MG/DL
CO2 SERPL-SCNC: 28 MMOL/L (ref 21–32)
CREAT SERPL-MCNC: 1.07 MG/DL (ref 0.6–1.3)
ERYTHROCYTE [DISTWIDTH] IN BLOOD BY AUTOMATED COUNT: 12.5 % (ref 11.6–15.1)
EST. AVERAGE GLUCOSE BLD GHB EST-MCNC: 123 MG/DL
GFR SERPL CREATININE-BSD FRML MDRD: 85 ML/MIN/1.73SQ M
GLUCOSE P FAST SERPL-MCNC: 84 MG/DL (ref 65–99)
HBA1C MFR BLD: 5.9 %
HCT VFR BLD AUTO: 47.1 % (ref 36.5–49.3)
HDLC SERPL-MCNC: 46 MG/DL
HGB BLD-MCNC: 15.4 G/DL (ref 12–17)
LDLC SERPL CALC-MCNC: 182 MG/DL (ref 0–100)
MCH RBC QN AUTO: 28.8 PG (ref 26.8–34.3)
MCHC RBC AUTO-ENTMCNC: 32.7 G/DL (ref 31.4–37.4)
MCV RBC AUTO: 88 FL (ref 82–98)
PLATELET # BLD AUTO: 246 THOUSANDS/UL (ref 149–390)
PMV BLD AUTO: 9.7 FL (ref 8.9–12.7)
POTASSIUM SERPL-SCNC: 3.8 MMOL/L (ref 3.5–5.3)
PROT SERPL-MCNC: 7.9 G/DL (ref 6.4–8.4)
RBC # BLD AUTO: 5.35 MILLION/UL (ref 3.88–5.62)
SODIUM SERPL-SCNC: 137 MMOL/L (ref 135–147)
TRIGL SERPL-MCNC: 67 MG/DL
TSH SERPL DL<=0.05 MIU/L-ACNC: 1.21 UIU/ML (ref 0.45–4.5)
WBC # BLD AUTO: 4.83 THOUSAND/UL (ref 4.31–10.16)

## 2023-08-02 PROCEDURE — 80061 LIPID PANEL: CPT

## 2023-08-02 PROCEDURE — 85027 COMPLETE CBC AUTOMATED: CPT

## 2023-08-02 PROCEDURE — 36415 COLL VENOUS BLD VENIPUNCTURE: CPT

## 2023-08-02 PROCEDURE — 83036 HEMOGLOBIN GLYCOSYLATED A1C: CPT

## 2023-08-02 PROCEDURE — 82248 BILIRUBIN DIRECT: CPT

## 2023-08-02 PROCEDURE — 80053 COMPREHEN METABOLIC PANEL: CPT

## 2023-08-02 PROCEDURE — 84443 ASSAY THYROID STIM HORMONE: CPT

## 2023-08-05 PROBLEM — H69.82 DYSFUNCTION OF LEFT EUSTACHIAN TUBE: Status: RESOLVED | Noted: 2018-02-27 | Resolved: 2023-08-05

## 2023-08-05 PROBLEM — H69.92 DYSFUNCTION OF LEFT EUSTACHIAN TUBE: Status: RESOLVED | Noted: 2018-02-27 | Resolved: 2023-08-05

## 2023-08-05 PROBLEM — H61.22 IMPACTED CERUMEN OF LEFT EAR: Status: RESOLVED | Noted: 2018-02-17 | Resolved: 2023-08-05

## 2023-08-06 NOTE — ASSESSMENT & PLAN NOTE
RTC 3 mo for IOP check. Red yeast rice, fish oil capsules. Low-fat low-cholesterol diet.     Strong family history of hyperlipidemia-both parents and CAD-maternal grandfather

## 2023-08-06 NOTE — ASSESSMENT & PLAN NOTE
History of asymptomatic bilirubin elevation with normal LFTs. We will reassess bilirubin level. If still elevated-consider right upper quadrant ultrasound (rule out Gilbert's syndrome).

## 2023-08-06 NOTE — ASSESSMENT & PLAN NOTE
Symptoms are well controlled on duloxetine 60 mg daily. Patient takes Flexeril and diclofenac as needed.   He was evaluated by by [facility]. [facility], inflammatory markers were normal.  PRN follow-up

## 2023-08-07 DIAGNOSIS — R73.02 IGT (IMPAIRED GLUCOSE TOLERANCE): ICD-10-CM

## 2023-08-07 DIAGNOSIS — E78.2 MIXED HYPERLIPIDEMIA: Primary | ICD-10-CM

## 2023-10-30 ENCOUNTER — OFFICE VISIT (OUTPATIENT)
Dept: FAMILY MEDICINE CLINIC | Facility: CLINIC | Age: 43
End: 2023-10-30
Payer: COMMERCIAL

## 2023-10-30 VITALS
HEART RATE: 73 BPM | SYSTOLIC BLOOD PRESSURE: 110 MMHG | RESPIRATION RATE: 16 BRPM | TEMPERATURE: 97.3 F | DIASTOLIC BLOOD PRESSURE: 70 MMHG | HEIGHT: 73 IN | OXYGEN SATURATION: 96 % | BODY MASS INDEX: 28.36 KG/M2 | WEIGHT: 214 LBS

## 2023-10-30 DIAGNOSIS — H69.91 EUSTACHIAN TUBE DYSFUNCTION, RIGHT: Primary | ICD-10-CM

## 2023-10-30 PROCEDURE — 99213 OFFICE O/P EST LOW 20 MIN: CPT | Performed by: NURSE PRACTITIONER

## 2023-10-30 NOTE — PROGRESS NOTES
FAMILY PRACTICE OFFICE VISIT       NAME: Le Ferreira  AGE: 44 y.o. SEX: male       : 1980        MRN: [id number]    Assessment and Plan   1. Eustachian tube dysfunction, right     Continue Nasacort 2 sprays each nostril daily. Call if symptoms are worsening, or not resolving over the next 2 weeks. Chief Complaint     Chief Complaint   Patient presents with    Ear Fullness     Patient is here for rt ear blockage 1 + days       History of Present Illness     Le Ferreira is a 44year old male presenting today for right ear hearing reduction and ear feels clogged. Used Nasacort nasal spray yesterday and today. Has yellow nasal drainage. Working with wood and cutting grass this weekend. He does have environmental allergies. Has history cerumen impaction. Uses ear plugs at work for hearing protection in a [employer] [place]. Review of Systems   Review of Systems   Constitutional: Negative. HENT:          Right ear as noted in HPI       I have reviewed the patient's medical history in detail; there are no changes to the history as noted in the electronic medical record. Objective     Vitals:    10/30/23 1415   BP: 110/70   Pulse: 73   Resp: 16   Temp: (!) 97.3 °F (36.3 °C)   TempSrc: Temporal   SpO2: 96%   Weight: 97.1 kg (214 lb)   Height: 6' 1" (1.854 m)     Wt Readings from Last 3 Encounters:   10/30/23 97.1 kg (214 lb)   23 93.4 kg (206 lb)   22 89.4 kg (197 lb)     Physical Exam  Vitals and nursing note reviewed. Constitutional:       General: He is not in acute distress. Appearance: Normal appearance. He is not ill-appearing. HENT:      Right Ear: Tympanic membrane normal.      Left Ear: Tympanic membrane normal.      Nose: No rhinorrhea. Comments: Pale nasal turbinates. Mouth/Throat:      Mouth: Mucous membranes are moist.      Comments: Clear post nasal drip  Cardiovascular:      Rate and Rhythm: Normal rate and regular rhythm. Heart sounds: No murmur heard. Pulmonary:      Effort: Pulmonary effort is normal.      Breath sounds: Normal breath sounds. Musculoskeletal:      Cervical back: Normal range of motion and neck supple. Lymphadenopathy:      Cervical: No cervical adenopathy. Neurological:      Mental Status: He is alert. ALLERGIES:  Allergies   Allergen Reactions    Latex Other (See Comments)     redness       Current Medications     Current Outpatient Medications   Medication Sig Dispense Refill    b complex vitamins capsule Take 1 capsule by mouth daily      cetirizine (ZyrTEC) 10 mg tablet Take 1 tablet by mouth as needed        cyclobenzaprine (FLEXERIL) 10 mg tablet Take 1 tablet (10 mg total) by mouth daily at bedtime as needed for muscle spasms 90 tablet 1    diclofenac (VOLTAREN) 75 mg EC tablet TAKE 1 TABLET TWICE A DAY AS NEEDED FOR BACK PAIN. TAKE WITH FOOD 60 tablet 2    DULoxetine (CYMBALTA) 60 mg delayed release capsule Take 1 capsule (60 mg total) by mouth daily 90 capsule 1    magnesium oxide (MAG-OX) 400 mg Take 1 tablet (400 mg total) by mouth 2 (two) times a day 180 tablet 1    Multiple Vitamins-Minerals (MULTIVITAMIN GUMMIES MENS PO) Take 1 each by mouth in the morning      NON FORMULARY Take 2 each by mouth in the morning Tumeric and Ginger with Broprenine      Omega-3 1400 MG CAPS Take 2 capsules by mouth in the morning      Red Yeast Rice Extract (RED YEAST RICE PO) Take by mouth daily 2 caps daily      Riboflavin 400 MG TABS Take 1  tablet daily 90 tablet 3    Cholecalciferol (Vitamin D3) 50 MCG (2000 UT) TABS Take 2,000 Units by mouth daily (Patient not taking: Reported on 8/30/2022)       No current facility-administered medications for this visit.          Health Maintenance     Health Maintenance   Topic Date Due    Hepatitis C Screening  Never done    HIV Screening  Never done    DTaP,Tdap,and Td Vaccines (1 - Tdap) Never done    COVID-19 Vaccine (3 - Pfizer series) 07/31/2021    BMI: Followup Plan  03/26/2023    Influenza Vaccine (1) Never done    Depression Remission PHQ  02/01/2024    Annual Physical  08/01/2024    BMI: Adult  10/30/2024    Pneumococcal Vaccine: Pediatrics (0 to 5 Years) and At-Risk Patients (6 to 59 Years)  Aged Out    HIB Vaccine  Aged Out    IPV Vaccine  Aged Out    Hepatitis A Vaccine  Aged Out    Meningococcal ACWY Vaccine  Aged Out    HPV Vaccine  Aged [employer] History   Administered Date(s) Administered    COVID-19 PFIZER VACCINE 0.3 ML IM 05/15/2021, 06/05/2021       [facility]

## 2023-12-18 ENCOUNTER — TELEPHONE (OUTPATIENT)
Dept: FAMILY MEDICINE CLINIC | Facility: CLINIC | Age: 43
End: 2023-12-18

## 2023-12-18 DIAGNOSIS — G89.29 CHRONIC PAIN OF RIGHT KNEE: Primary | ICD-10-CM

## 2023-12-18 DIAGNOSIS — M25.561 CHRONIC PAIN OF RIGHT KNEE: Primary | ICD-10-CM

## 2023-12-18 NOTE — TELEPHONE ENCOUNTER
Pt called and wanted to know if he could have a referral to orthopedics. He has had ACL and meniscus surgery on his Rt knee when he was a child. He is now experiencing frequent popping, aches, and pains. Please advise.

## 2023-12-26 DIAGNOSIS — M79.18 MYOFASCIAL PAIN SYNDROME: ICD-10-CM

## 2023-12-26 RX ORDER — DULOXETIN HYDROCHLORIDE 60 MG/1
60 CAPSULE, DELAYED RELEASE ORAL DAILY
Qty: 90 CAPSULE | Refills: 0 | Status: SHIPPED | OUTPATIENT
Start: 2023-12-26

## 2024-03-23 DIAGNOSIS — M79.18 MYOFASCIAL PAIN SYNDROME: ICD-10-CM

## 2024-03-25 RX ORDER — DULOXETIN HYDROCHLORIDE 60 MG/1
60 CAPSULE, DELAYED RELEASE ORAL DAILY
Qty: 90 CAPSULE | Refills: 0 | Status: SHIPPED | OUTPATIENT
Start: 2024-03-25

## 2024-06-21 DIAGNOSIS — M79.18 MYOFASCIAL PAIN SYNDROME: ICD-10-CM

## 2024-06-21 RX ORDER — DULOXETIN HYDROCHLORIDE 60 MG/1
60 CAPSULE, DELAYED RELEASE ORAL DAILY
Qty: 90 CAPSULE | Refills: 0 | Status: SHIPPED | OUTPATIENT
Start: 2024-06-21

## 2024-06-22 ENCOUNTER — HOSPITAL ENCOUNTER (EMERGENCY)
Facility: HOSPITAL | Age: 44
Discharge: HOME | End: 2024-06-22
Attending: EMERGENCY MEDICINE
Payer: COMMERCIAL

## 2024-06-22 VITALS
RESPIRATION RATE: 17 BRPM | DIASTOLIC BLOOD PRESSURE: 104 MMHG | HEART RATE: 73 BPM | TEMPERATURE: 97.5 F | OXYGEN SATURATION: 99 % | SYSTOLIC BLOOD PRESSURE: 142 MMHG

## 2024-06-22 DIAGNOSIS — K08.89 PAIN, DENTAL: Primary | ICD-10-CM

## 2024-06-22 PROCEDURE — 96372 THER/PROPH/DIAG INJ SC/IM: CPT

## 2024-06-22 PROCEDURE — 99284 EMERGENCY DEPT VISIT MOD MDM: CPT | Performed by: EMERGENCY MEDICINE

## 2024-06-22 PROCEDURE — 99282 EMERGENCY DEPT VISIT SF MDM: CPT

## 2024-06-22 RX ORDER — KETOROLAC TROMETHAMINE 30 MG/ML
15 INJECTION, SOLUTION INTRAMUSCULAR; INTRAVENOUS ONCE
Status: COMPLETED | OUTPATIENT
Start: 2024-06-22 | End: 2024-06-22

## 2024-06-22 RX ORDER — CHLORHEXIDINE GLUCONATE ORAL RINSE 1.2 MG/ML
15 SOLUTION DENTAL 2 TIMES DAILY
Qty: 120 ML | Refills: 0 | Status: SHIPPED | OUTPATIENT
Start: 2024-06-22

## 2024-06-22 RX ORDER — NAPROXEN 500 MG/1
500 TABLET ORAL 2 TIMES DAILY WITH MEALS
Qty: 30 TABLET | Refills: 0 | Status: SHIPPED | OUTPATIENT
Start: 2024-06-22

## 2024-06-22 RX ADMIN — KETOROLAC TROMETHAMINE 15 MG: 30 INJECTION, SOLUTION INTRAMUSCULAR at 08:00

## 2024-06-22 NOTE — ED ATTENDING ATTESTATION
6/22/2024  I, Martin Chadwick DO, saw and evaluated the patient. I have discussed the patient with the resident/non-physician practitioner and agree with the resident's/non-physician practitioner's findings, Plan of Care, and MDM as documented in the resident's/non-physician practitioner's note, except where noted. All available labs and Radiology studies were reviewed.  I was present for key portions of any procedure(s) performed by the resident/non-physician practitioner and I was immediately available to provide assistance.       At this point I agree with the current assessment done in the Emergency Department.  I have conducted an independent evaluation of this patient a history and physical is as follows:      44 yom with left lower dental pain, no trismus, some swelling. No fevers. Was taking amoxil. On exam, poor dentition, no definite periapical abscess.  Multiple metallic crowns, gingival inflammation without any evidence of periosteal or periapical abscess.  No trismus.  Plan chlorhexidine continue amoxicillin follow-up with dentistry differential includes periapical abscess, inflamed gingiva, pericoronitis      ED Course         Critical Care Time  Procedures

## 2024-06-22 NOTE — DISCHARGE INSTRUCTIONS
You were seen in the emergency department today for dental pain.    Follow up with your dentist or the dental clinic.     Take naproxen as prescribed for pain.  Continue taking antibiotics as previously prescribed by your dentist.  Use chlorhexidine mouth rinse as prescribed.     Return to the emergency department for any new or concerning symptoms including difficulty swallowing, worsening swelling, fevers.     Thank you for choosing [facility] for your care today.

## 2024-06-22 NOTE — ED PROVIDER NOTES
History  Chief Complaint   Patient presents with    Dental Pain     Pt recently had right lower crown placed on wednesday and now having lower left side facial swelling/pain      HPI  Patient is a 44 y.o. male with history of poor dentition presenting to the emergency department for dental pain. Patient states that he left lower pre-molar began hurting on Thursday of last week. He had amoxicillin leftover from previous dental infections.  States that he had 3 pills left which she began taking, and then refilled his prescription for amoxicillin and has been taking it as previously prescribed by his dentist.  Has also been taking medication for pain.  Patient called his dentist but was not contacted back and came to the ED for further evaluation.  Complains of having swelling to the side of his face.  Denies having any difficulty swallowing, fevers, chills.     Prior to Admission Medications   Prescriptions Last Dose Informant Patient Reported? Taking?   Cholecalciferol (Vitamin D3) 50 MCG (2000 UT) TABS  Self Yes No   Sig: Take 2,000 Units by mouth daily   Patient not taking: Reported on 8/30/2022   DULoxetine (CYMBALTA) 60 mg delayed release capsule   No No   Sig: TAKE ONE CAPSULE BY MOUTH DAILY   Multiple Vitamins-Minerals (MULTIVITAMIN GUMMIES MENS PO)  Self Yes No   Sig: Take 1 each by mouth in the morning   NON FORMULARY  Self Yes No   Sig: Take 2 each by mouth in the morning Tumeric and Ginger with Broprenine   Omega-3 1400 MG CAPS  Self Yes No   Sig: Take 2 capsules by mouth in the morning   Red Yeast Rice Extract (RED YEAST RICE PO)  Self Yes No   Sig: Take by mouth daily 2 caps daily   Riboflavin 400 MG TABS  Self No No   Sig: Take 1  tablet daily   b complex vitamins capsule  Self Yes No   Sig: Take 1 capsule by mouth daily   cetirizine (ZyrTEC) 10 mg tablet  Self Yes No   Sig: Take 1 tablet by mouth as needed     cyclobenzaprine (FLEXERIL) 10 mg tablet   No No   Sig: Take 1 tablet (10 mg total) by mouth  daily at bedtime as needed for muscle spasms   diclofenac (VOLTAREN) 75 mg EC tablet   No No   Sig: TAKE 1 TABLET TWICE A DAY AS NEEDED FOR BACK PAIN. TAKE WITH FOOD   magnesium oxide (MAG-OX) 400 mg  Self No No   Sig: Take 1 tablet (400 mg total) by mouth 2 (two) times a day      Facility-Administered Medications: None       Past Medical History:   Diagnosis Date    Anxiety     Arthritis        Past Surgical History:   Procedure Laterality Date    KNEE ARTHROSCOPY W/ ACL RECONSTRUCTION Right     KNEE SURGERY         Family History   Problem Relation Age of Onset    Hyperlipidemia Mother     Melanoma Mother     Hyperlipidemia Father     Coronary artery disease Maternal Grandfather      I have reviewed and agree with the history as documented.    E-Cigarette/Vaping    E-Cigarette Use Never User      E-Cigarette/Vaping Substances    Nicotine No     THC No     CBD No     Flavoring No     Other No     Unknown No      Social History     Tobacco Use    Smoking status: Never    Smokeless tobacco: Never   Vaping Use    Vaping status: Never Used   Substance Use Topics    Alcohol use: No     Comment: Occasional use per [employer]     Drug use: No        Review of Systems   Constitutional:  Negative for fever.   HENT:  Positive for dental problem. Negative for congestion.    Eyes:  Negative for pain.   Respiratory:  Negative for cough.    Cardiovascular:  Negative for chest pain.   Gastrointestinal:  Negative for diarrhea and vomiting.   Genitourinary:  Negative for dysuria.   Musculoskeletal:  Negative for back pain.   Skin:  Negative for rash.   Neurological:  Negative for dizziness.   All other systems reviewed and are negative.      Physical Exam  ED Triage Vitals [06/22/24 0720]   Temperature Pulse Respirations Blood Pressure SpO2   97.5 °F (36.4 °C) 73 17 (!) 142/104 99 %      Temp Source Heart Rate Source Patient Position - Orthostatic VS BP Location FiO2 (%)   Temporal Monitor -- Left arm --      Pain Score       2              Orthostatic Vital Signs  Vitals:    06/22/24 0720   BP: (!) 142/104   Pulse: 73       Physical Exam  Vitals and nursing note reviewed.   Constitutional:       General: He is not in acute distress.     Appearance: He is not ill-appearing.   HENT:      Head: Normocephalic and atraumatic.      Mouth/Throat:      Mouth: Mucous membranes are moist.        Comments: Pain in area drawn above.  Poor dentition diffusely.  Multiple crowns.  No evidence of intraoral abscess.  Eyes:      Conjunctiva/sclera: Conjunctivae normal.   Cardiovascular:      Rate and Rhythm: Normal rate.   Pulmonary:      Effort: Pulmonary effort is normal. No respiratory distress.   Abdominal:      General: There is no distension.   Musculoskeletal:         General: Normal range of motion.      Cervical back: Neck supple.   Skin:     General: Skin is warm.   Neurological:      General: No focal deficit present.      Mental Status: He is alert.   Psychiatric:         Mood and Affect: Mood normal.         ED Medications  Medications   ketorolac (TORADOL) injection 15 mg (has no administration in time range)       Diagnostic Studies  Results Reviewed       None                   No orders to display         Procedures  Procedures      ED Course                             SBIRT 20yo+      Flowsheet Row Most Recent Value   Initial Alcohol Screen: US AUDIT-C     1. How often do you have a drink containing alcohol? 0 Filed at: 06/22/2024 0722   2. How many drinks containing alcohol do you have on a typical day you are drinking?  0 Filed at: 06/22/2024 0722   3a. Male UNDER 65: How often do you have five or more drinks on one occasion? 0 Filed at: 06/22/2024 0722   3b. FEMALE Any Age, or MALE 65+: How often do you have 4 or more drinks on one occassion? 0 Filed at: 06/22/2024 0722   Audit-C Score 0 Filed at: 06/22/2024 0722   LARS: How many times in the past year have you...    Used an illegal drug or used a prescription medication for non-medical  "reasons? Never Filed at: 06/22/2024 0722                  Medical Decision Making  Patient is a 44 y.o. male with PMH of poor dentition who presents to the ED with dental pain.    Vital signs stable, afebrile, not tachycardic. On exam no evidence of intra-oral abscess.     Plan: will give toradol for pain control. Advised patient to continue taking amoxicillin as prescribed by his dentist    View ED course above for further discussion on patient workup.     Upon re-evaluation pain improved.    Disposition: I have reviewed the patient's vital signs, nursing notes, and other relevant tests/information. I had a detailed discussion with the patient regarding the history, exam findings, and any diagnostic results.   Plan to discharge home in stable condition with naproxen, chlorhexidine, follow up with dentist.  Discussed with patient who is agreeable to plan.  I discussed discharge instructions, need for follow-up, and oral return precautions for what to return for in addition to the written return precautions and discharge instructions, specifically highlighting areas of special concern.  The patient verbalized understanding of the discharge instructions and warnings that would necessitate return to the Emergency Department including difficulty swallowing, fever.  All questions the patient had were answered prior to discharge to the best of my ability.       Portions of the record may have been created with voice recognition software. Occasional wrong word or \"sound a like\" substitutions may have occurred due to the inherent limitations of voice recognition software. Read the chart carefully and recognize, using context, where substitutions have occurred.        Disposition  Final diagnoses:   Pain, dental     Time reflects when diagnosis was documented in both MDM as applicable and the Disposition within this note       Time User Action Codes Description Comment    6/22/2024  7:54 AM Brisa Walker Add [K08.89] " Pain, dental           ED Disposition       ED Disposition   Discharge    Condition   Stable    Date/Time   Sat Jun 22, 2024 0751    Comment   Erik Farooq discharge to home/self care.                   Follow-up Information       Follow up With Specialties Details Why Contact Info    Dental clinic                Patient's Medications   Discharge Prescriptions    CHLORHEXIDINE (PERIDEX) 0.12 % SOLUTION    Apply 15 mL to the mouth or throat 2 (two) times a day       Start Date: 6/22/2024 End Date: --       Order Dose: 15 mL       Quantity: 120 mL    Refills: 0    NAPROXEN (NAPROSYN) 500 MG TABLET    Take 1 tablet (500 mg total) by mouth 2 (two) times a day with meals       Start Date: 6/22/2024 End Date: --       Order Dose: 500 mg       Quantity: 30 tablet    Refills: 0     No discharge procedures on file.    PDMP Review         Value Time User    PDMP Reviewed  Yes 7/7/2022  9:09 AM Blayne Reddy MD             ED Provider  Attending physically available and evaluated Erik Farooq. I managed the patient along with the ED Attending.    Electronically Signed by           Brisa Walker DO  06/22/24 0751

## 2024-07-01 ENCOUNTER — APPOINTMENT (OUTPATIENT)
Dept: LAB | Facility: CLINIC | Age: 44
End: 2024-07-01
Payer: COMMERCIAL

## 2024-07-01 DIAGNOSIS — E78.2 MIXED HYPERLIPIDEMIA: ICD-10-CM

## 2024-07-01 DIAGNOSIS — R73.02 IGT (IMPAIRED GLUCOSE TOLERANCE): ICD-10-CM

## 2024-07-01 LAB
CHOLEST SERPL-MCNC: 202 MG/DL
EST. AVERAGE GLUCOSE BLD GHB EST-MCNC: 117 MG/DL
HBA1C MFR BLD: 5.7 %
HDLC SERPL-MCNC: 33 MG/DL
LDLC SERPL CALC-MCNC: 144 MG/DL (ref 0–100)
TRIGL SERPL-MCNC: 124 MG/DL

## 2024-07-01 PROCEDURE — 80061 LIPID PANEL: CPT

## 2024-07-01 PROCEDURE — 83036 HEMOGLOBIN GLYCOSYLATED A1C: CPT

## 2024-07-01 PROCEDURE — 36415 COLL VENOUS BLD VENIPUNCTURE: CPT

## 2024-09-12 ENCOUNTER — OFFICE VISIT (OUTPATIENT)
Dept: DERMATOLOGY | Facility: CLINIC | Age: 44
End: 2024-09-12
Payer: COMMERCIAL

## 2024-09-12 VITALS — WEIGHT: 214.6 LBS | BODY MASS INDEX: 28.31 KG/M2 | TEMPERATURE: 96.9 F

## 2024-09-12 DIAGNOSIS — L70.0 ACNE VULGARIS: ICD-10-CM

## 2024-09-12 DIAGNOSIS — D22.5 MULTIPLE BENIGN MELANOCYTIC NEVI OF UPPER EXTREMITY, LOWER EXTREMITY, AND TRUNK: Primary | ICD-10-CM

## 2024-09-12 DIAGNOSIS — D22.60 MULTIPLE BENIGN MELANOCYTIC NEVI OF UPPER EXTREMITY, LOWER EXTREMITY, AND TRUNK: Primary | ICD-10-CM

## 2024-09-12 DIAGNOSIS — L91.8 SKIN TAG: ICD-10-CM

## 2024-09-12 DIAGNOSIS — D22.70 MULTIPLE BENIGN MELANOCYTIC NEVI OF UPPER EXTREMITY, LOWER EXTREMITY, AND TRUNK: Primary | ICD-10-CM

## 2024-09-12 PROCEDURE — 99213 OFFICE O/P EST LOW 20 MIN: CPT

## 2024-09-12 NOTE — PATIENT INSTRUCTIONS
"MULTIPLE MELANOCYTIC NEVI (\"Moles\")      Assessment and Plan:  Based on a thorough discussion of this condition and the management approach to it (including a comprehensive discussion of the known risks, side effects and potential benefits of treatment), the patient (family) agrees to implement the following specific plan:  Reassure benign  Monitor for changes  Use sun protection.  Apply SPF 30 or higher at least three times a day.  Wear sun protecting clothing and hats.  Follow up in 1 year       Worrisome signs of skin malignancy discussed, questions answered. Regular self-skin check discussed. Advised to call or return to office if patient notices any spots of concern, rapidly growing/changing lesions, bleeding lesions, non-healing lesions. Advised regular SPF use.        ACROCHORDON (\"SKIN TAG\")    Assessment and Plan:  Based on a thorough discussion of this condition and the management approach to it (including a comprehensive discussion of the known risks, side effects and potential benefits of treatment), the patient (family) agrees to implement the following specific plan:  Option of removal, discussed the cost of procedure: $150 for up to 10 spots.    Skin tags are common, soft, harmless skin lesions that are also called, in the appropriate settings, papillomas, fibroepithelial polyps, and soft fibromas.  They are made up of loosely arranged collagen fibers and blood vessels surrounded by a thickened or thinned-out epidermis.    Skin tags tend to develop in both men and women as we grow older.  They are usually found on the skin folds (neck, armpits, groin).  It is not known what specifically causes skin tags.  Certain factors, though, do appear to play a role:  Chaffing and irritation from skin rubbing together  High levels of growth factors (as seen, for example, in pregnancy or in acromegaly/gigantism)  Insulin resistance  Human papillomavirus (wart virus)    We discussed that most skin tags do not need " "to be treated unless they are specifically causing the patient physical distress or limitation or pose a risk for a larger problem such as an infection that forms secondary to excoriation or chronic irritation.    We had a thorough discussion of treatment options and specific risks (including that any procedural treatment may not be covered by insurance and would then be the patient's responsibility) and benefits/alternatives including but not limited to the following:  Cryotherapy (freezing)  Shave removal  Surgical excision (snip excision with scissors)  Electrosurgery  Ligation (we do not do this procedure and counseled against it due to risk of tissue necrosis and infection)      ACNE VULGARIS (\"COMMON ACNE\")    Assessment and Plan:  Recommend using an over-the-counter 10% benzoyl peroxide or salicylic acid body wash in the shower    WHEN AND WHERE TO CALL WITH CONCERNS  We are here to help!  If you experience any unusual symptoms, then stop taking or using the medication and call our office at [phone] (SKIN).  It is better to be safe than to be sorry!     "

## 2024-09-12 NOTE — PROGRESS NOTES
"[facility] D[facility]atology Clinic Note     Patient Name: Erik Farooq  Encounter Date: 9/12/24     Have you been cared for by a [facility] Dermatologist in the last 3 years and, if so, which description applies to you?    Yes.  I have been here within the last 3 years, and my medical history has NOT changed since that time.  I am MALE/not capable of bearing children.    REVIEW OF SYSTEMS:  Have you recently had or currently have any of the following? No changes in my recent health.   PAST MEDICAL HISTORY:  Have you personally ever had or currently have any of the following?  If \"YES,\" then please provide more detail. No changes in my medical history.   HISTORY OF IMMUNOSUPPRESSION: Do you have a history of any of the following:  Systemic Immunosuppression such as Diabetes, Biologic or Immunotherapy, Chemotherapy, Organ Transplantation, Bone Marrow Transplantation or Prednisone?  No     Answering \"YES\" requires the addition of the dotphrase \"IMMUNOSUPPRESSED\" as the first diagnosis of the patient's visit.   FAMILY HISTORY:  Any \"first degree relatives\" (parent, brother, sister, or child) with the following?    No changes in my family's known health.   PATIENT EXPERIENCE:    Do you want the Dermatologist to perform a COMPLETE skin exam today including a clinical examination under the \"bra and underwear\" areas?  Yes  If necessary, do we have your permission to call and leave a detailed message on your Preferred Phone number that includes your specific medical information?  Yes      Allergies   Allergen Reactions    Latex Other (See Comments)     redness      Current Outpatient Medications:     b complex vitamins capsule, Take 1 capsule by mouth daily, Disp: , Rfl:     cetirizine (ZyrTEC) 10 mg tablet, Take 1 tablet by mouth as needed  , Disp: , Rfl:     chlorhexidine (PERIDEX) 0.12 % solution, Apply 15 mL to the mouth or throat 2 (two) times a day, Disp: 120 mL, Rfl: 0    Cholecalciferol (Vitamin D3) 50 MCG (2000 UT) " "TABS, Take 2,000 Units by mouth daily (Patient not taking: Reported on 8/30/2022), Disp: , Rfl:     cyclobenzaprine (FLEXERIL) 10 mg tablet, Take 1 tablet (10 mg total) by mouth daily at bedtime as needed for muscle spasms, Disp: 90 tablet, Rfl: 1    diclofenac (VOLTAREN) 75 mg EC tablet, TAKE 1 TABLET TWICE A DAY AS NEEDED FOR BACK PAIN. TAKE WITH FOOD, Disp: 60 tablet, Rfl: 2    DULoxetine (CYMBALTA) 60 mg delayed release capsule, TAKE ONE CAPSULE BY MOUTH DAILY, Disp: 90 capsule, Rfl: 0    magnesium oxide (MAG-OX) 400 mg, Take 1 tablet (400 mg total) by mouth 2 (two) times a day, Disp: 180 tablet, Rfl: 1    Multiple Vitamins-Minerals (MULTIVITAMIN GUMMIES MENS PO), Take 1 each by mouth in the morning, Disp: , Rfl:     naproxen (Naprosyn) 500 mg tablet, Take 1 tablet (500 mg total) by mouth 2 (two) times a day with meals, Disp: 30 tablet, Rfl: 0    NON FORMULARY, Take 2 each by mouth in the morning Tumeric and Ginger with Broprenine, Disp: , Rfl:     Omega-3 1400 MG CAPS, Take 2 capsules by mouth in the morning, Disp: , Rfl:     Red Yeast Rice Extract (RED YEAST RICE PO), Take by mouth daily 2 caps daily, Disp: , Rfl:     Riboflavin 400 MG TABS, Take 1  tablet daily, Disp: 90 tablet, Rfl: 3          Whom besides the patient is providing clinical information about today's encounter?   NO ADDITIONAL HISTORIAN (patient alone provided history)    Physical Exam and Assessment/Plan by Diagnosis:    Patient presents today for a full-body skin check. Family hx of skin cancer: father - BCC; mother - ocular melanoma.     MULTIPLE MELANOCYTIC NEVI (\"Moles\")     Physical Exam:  Anatomic Location Affected: Trunk and extremities  Morphological Description:  Scattered, round to ovoid, symmetrical-appearing, even bordered, skin colored to dark brown macules/papules  Denies pain, itch, bleeding. No treatments tried. Present for years. Present constantly; no modifying factors which make it worse or better. Denies actively changing " "or growing moles.      Assessment and Plan:  Based on a thorough discussion of this condition and the management approach to it (including a comprehensive discussion of the known risks, side effects and potential benefits of treatment), the patient (family) agrees to implement the following specific plan:  Reassure benign  Monitor for changes  Use sun protection.  Apply SPF 30 or higher at least three times a day.  Wear sun protecting clothing and hats.       Worrisome signs of skin malignancy discussed, questions answered. Regular self-skin check discussed. Advised to call or return to office if patient notices any spots of concern, rapidly growing/changing lesions, bleeding lesions, non-healing lesions. Advised regular SPF use.        ACROCHORDON (\"SKIN TAG\")    Physical Exam:  Anatomic Location Affected:  axillae  Morphological Description:  skin-colored pink pedunculated papules  Pertinent Positives:  Pertinent Negatives:    Additional History of Present Condition:  Patient reports that these spots are mildly irritating.    Assessment and Plan:  Based on a thorough discussion of this condition and the management approach to it (including a comprehensive discussion of the known risks, side effects and potential benefits of treatment), the patient (family) agrees to implement the following specific plan:  Option of removal, discussed the cosmetic fee of $150 for removal of up to 10 lesions    Skin tags are common, soft, harmless skin lesions that are also called, in the appropriate settings, papillomas, fibroepithelial polyps, and soft fibromas.  They are made up of loosely arranged collagen fibers and blood vessels surrounded by a thickened or thinned-out epidermis.    Skin tags tend to develop in both men and women as we grow older.  They are usually found on the skin folds (neck, armpits, groin).  It is not known what specifically causes skin tags.  Certain factors, though, do appear to play a role:  Chaffing " "and irritation from skin rubbing together  High levels of growth factors (as seen, for example, in pregnancy or in acromegaly/gigantism)  Insulin resistance  Human papillomavirus (wart virus)    We discussed that most skin tags do not need to be treated unless they are specifically causing the patient physical distress or limitation or pose a risk for a larger problem such as an infection that forms secondary to excoriation or chronic irritation.    We had a thorough discussion of treatment options and specific risks (including that any procedural treatment may not be covered by insurance and would then be the patient's responsibility) and benefits/alternatives including but not limited to the following:  Cryotherapy (freezing)  Shave removal  Surgical excision (snip excision with scissors)  Electrosurgery  Ligation (we do not do this procedure and counseled against it due to risk of tissue necrosis and infection)      ACNE VULGARIS (\"COMMON ACNE\")    Physical Exam:  Psychiatric/Mood: normal  Anatomic Location Affected:  chest  Morphological Description: excoriated papules and residual erythema from previous acne      Additional History of Present Condition:  Patient reports that his acne becomes worse in the summer.    Assessment and Plan:  Recommend using an over-the-counter 10% benzoyl peroxide or salicylic acid body wash in the shower to dry out acne on chest  Discussed option for topical antibiotic to be added in addition to wash. Patient would like to start with just a wash    WHEN AND WHERE TO CALL WITH CONCERNS  We are here to help!  If you experience any unusual symptoms, then stop taking or using the medication and call our office at [phone] (SKIN).  It is better to be safe than to be sorry!     Scribe Attestation      I,:  Hazel Schwartz MA am acting as a scribe while in the presence of the attending physician.:       I,:  Kiya Song PA-C personally performed the services described in this " documentation    as scribed in my presence.:

## 2024-09-20 DIAGNOSIS — M79.18 MYOFASCIAL PAIN SYNDROME: ICD-10-CM

## 2024-09-20 RX ORDER — DULOXETIN HYDROCHLORIDE 60 MG/1
60 CAPSULE, DELAYED RELEASE ORAL DAILY
Qty: 60 CAPSULE | Refills: 0 | Status: SHIPPED | OUTPATIENT
Start: 2024-09-20

## 2024-10-25 ENCOUNTER — OFFICE VISIT (OUTPATIENT)
Dept: URGENT CARE | Age: 44
End: 2024-10-25
Payer: COMMERCIAL

## 2024-10-25 VITALS
DIASTOLIC BLOOD PRESSURE: 87 MMHG | RESPIRATION RATE: 18 BRPM | SYSTOLIC BLOOD PRESSURE: 124 MMHG | OXYGEN SATURATION: 100 % | TEMPERATURE: 98.1 F | HEART RATE: 68 BPM

## 2024-10-25 DIAGNOSIS — Z23 ENCOUNTER FOR IMMUNIZATION: ICD-10-CM

## 2024-10-25 DIAGNOSIS — T14.8XXA DEEP WOUND OF SKIN DUE TO AVULSION: Primary | ICD-10-CM

## 2024-10-25 PROCEDURE — 90715 TDAP VACCINE 7 YRS/> IM: CPT

## 2024-10-25 PROCEDURE — 99213 OFFICE O/P EST LOW 20 MIN: CPT

## 2024-10-25 PROCEDURE — 90471 IMMUNIZATION ADMIN: CPT

## 2024-10-25 NOTE — PATIENT INSTRUCTIONS
Wound care as discussed.  Change dressing 2x day.  Xeroform to wound bed.  Band aid to cover. Give wound time open to air in clean environment.   Monitor for signs of infection: redness, warmth, increased pain, red streaks going up the hand, difficulty making a fist.  If you have any of these signs, please return to .    We updated your Tetanus shot today.    A referral has been placed for the hand specialist.

## 2024-10-25 NOTE — PROGRESS NOTES
[facility]        NAME: Erik Farooq is a 44 y.o. male  : 1980    MRN: [id number]  DATE: 2024  TIME: 4:55 PM    Assessment and Plan   Deep wound of skin due to avulsion [T14.8XXA]  1. Deep wound of skin due to avulsion        2. Encounter for immunization  Tdap Vaccine greater than or equal to 8yo        Wound care as discussed.  Change dressing 2x day.  Xeroform to wound bed.  Band aid to cover. Give wound time open to air in clean environment.   Monitor for signs of infection: redness, warmth, increased pain, red streaks going up the hand, difficulty making a fist.  If you have any of these signs, please return to .    We updated your Tetanus shot today.    A referral has been placed for the hand specialist.     Patient Instructions       Follow up with PCP in 3-5 days.  Proceed to  ER if symptoms worsen.    If tests have been performed at [facility], our office will contact you with results if changes need to be made to the care plan discussed with you at the visit.  You can review your full results on [facility] MyChart.    Chief Complaint     Chief Complaint   Patient presents with    Finger Laceration         History of Present Illness       Pt is a 44 year old male presenting with wound to volar aspect of right pinky finger after cutting self on wound plainer 1 day ago..  Unknown last tetanus shot. He is right handed. He denies numbness, tingling, or weakness to right hand.     Finger Laceration        Review of Systems   Review of Systems   Skin:  Positive for wound.         Current Medications       Current Outpatient Medications:     b complex vitamins capsule, Take 1 capsule by mouth daily, Disp: , Rfl:     cetirizine (ZyrTEC) 10 mg tablet, Take 1 tablet by mouth as needed  , Disp: , Rfl:     chlorhexidine (PERIDEX) 0.12 % solution, Apply 15 mL to the mouth or throat 2 (two) times a day, Disp: 120 mL, Rfl: 0    Cholecalciferol (Vitamin D3) 50 MCG (2000) TABS, Take  2,000 Units by mouth daily (Patient not taking: Reported on 8/30/2022), Disp: , Rfl:     cyclobenzaprine (FLEXERIL) 10 mg tablet, Take 1 tablet (10 mg total) by mouth daily at bedtime as needed for muscle spasms, Disp: 90 tablet, Rfl: 1    diclofenac (VOLTAREN) 75 mg EC tablet, TAKE 1 TABLET TWICE A DAY AS NEEDED FOR BACK PAIN. TAKE WITH FOOD, Disp: 60 tablet, Rfl: 2    DULoxetine (CYMBALTA) 60 mg delayed release capsule, TAKE ONE CAPSULE BY MOUTH DAILY, Disp: 60 capsule, Rfl: 0    magnesium oxide (MAG-OX) 400 mg, Take 1 tablet (400 mg total) by mouth 2 (two) times a day, Disp: 180 tablet, Rfl: 1    Multiple Vitamins-Minerals (MULTIVITAMIN GUMMIES MENS PO), Take 1 each by mouth in the morning, Disp: , Rfl:     naproxen (Naprosyn) 500 mg tablet, Take 1 tablet (500 mg total) by mouth 2 (two) times a day with meals, Disp: 30 tablet, Rfl: 0    NON FORMULARY, Take 2 each by mouth in the morning Tumeric and Ginger with Broprenine, Disp: , Rfl:     Omega-3 1400 MG CAPS, Take 2 capsules by mouth in the morning, Disp: , Rfl:     Red Yeast Rice Extract (RED YEAST RICE PO), Take by mouth daily 2 caps daily, Disp: , Rfl:     Riboflavin 400 MG TABS, Take 1  tablet daily, Disp: 90 tablet, Rfl: 3    Current Allergies     Allergies as of 10/25/2024 - Reviewed 09/12/2024   Allergen Reaction Noted    Latex Other (See Comments) 09/17/2019            The following portions of the patient's history were reviewed and updated as appropriate: allergies, current medications, past family history, past medical history, past social history, past surgical history and problem list.     Past Medical History:   Diagnosis Date    Anxiety     Arthritis        Past Surgical History:   Procedure Laterality Date    KNEE ARTHROSCOPY W/ ACL RECONSTRUCTION Right     KNEE SURGERY         Family History   Problem Relation Age of Onset    Hyperlipidemia Mother     Melanoma Mother     Hyperlipidemia Father     Coronary artery disease Maternal Grandfather           Medications have been verified.        Objective   /87   Pulse 68   Temp 98.1 °F (36.7 °C)   Resp 18   SpO2 100%   No LMP for male patient.       Physical Exam     Physical Exam  Vitals and nursing note reviewed.   Constitutional:       General: He is not in acute distress.     Appearance: Normal appearance. He is normal weight.   Pulmonary:      Effort: Pulmonary effort is normal. No respiratory distress.   Musculoskeletal:      Right hand: No swelling or tenderness. Normal range of motion. Normal sensation. There is no disruption of two-point discrimination.        Hands:       Comments: Right hand: Volar aspect distal phalanx of pinky finger: approximately 1.5cmx.5cm avulsion of finger pulp.  No active bleeding, edges macerated. Good  strength and rom.  Neurovascular grossly intact. 2 point discrimitnact. No nail or nailbed involvement.    Skin:     General: Skin is warm.   Neurological:      Mental Status: He is alert.

## 2024-12-03 ENCOUNTER — OFFICE VISIT (OUTPATIENT)
Dept: FAMILY MEDICINE CLINIC | Facility: CLINIC | Age: 44
End: 2024-12-03
Payer: COMMERCIAL

## 2024-12-03 VITALS
HEIGHT: 73 IN | HEART RATE: 59 BPM | BODY MASS INDEX: 27.83 KG/M2 | TEMPERATURE: 97.6 F | SYSTOLIC BLOOD PRESSURE: 120 MMHG | OXYGEN SATURATION: 98 % | WEIGHT: 210 LBS | RESPIRATION RATE: 18 BRPM | DIASTOLIC BLOOD PRESSURE: 80 MMHG

## 2024-12-03 DIAGNOSIS — Z13.1 ENCOUNTER FOR SCREENING FOR DIABETES MELLITUS: ICD-10-CM

## 2024-12-03 DIAGNOSIS — M79.18 MYOFASCIAL PAIN SYNDROME: ICD-10-CM

## 2024-12-03 DIAGNOSIS — Z00.00 ENCOUNTER FOR WELLNESS EXAMINATION IN ADULT: Primary | ICD-10-CM

## 2024-12-03 DIAGNOSIS — E78.2 MIXED HYPERLIPIDEMIA: ICD-10-CM

## 2024-12-03 PROCEDURE — 99396 PREV VISIT EST AGE 40-64: CPT | Performed by: FAMILY MEDICINE

## 2024-12-03 RX ORDER — OXYCODONE AND ACETAMINOPHEN 5; 325 MG/1; MG/1
TABLET ORAL
COMMUNITY
Start: 2024-10-25 | End: 2024-12-03

## 2024-12-03 RX ORDER — DULOXETIN HYDROCHLORIDE 60 MG/1
60 CAPSULE, DELAYED RELEASE ORAL DAILY
Qty: 100 CAPSULE | Refills: 3 | Status: SHIPPED | OUTPATIENT
Start: 2024-12-03

## 2024-12-03 RX ORDER — IBUPROFEN 600 MG/1
TABLET, FILM COATED ORAL
COMMUNITY
Start: 2024-10-25

## 2024-12-03 NOTE — ASSESSMENT & PLAN NOTE
Symptoms well-controlled on duloxetine 60 mg daily  Patient uses anti-inflammatories and muscle relaxants very infrequently  Orders:    DULoxetine (CYMBALTA) 60 mg delayed release capsule; Take 1 capsule (60 mg total) by mouth daily

## 2024-12-03 NOTE — ASSESSMENT & PLAN NOTE
Lipid panel in July 2022 revealed total cholesterol of 202 and LDL of 144  Patient's cholesterol has significantly improved from 2023 (total 241 with LDL of 182)  Continue low-fat low-cholesterol diet  Patient remains on red yeast rice 10 fatty 3 omega supplements  Repeat blood work in summer 2025  Orders:    Comprehensive metabolic panel; Future    Lipid Panel with Direct LDL reflex; Future    CBC; Future    TSH, 3rd generation; Future

## 2024-12-03 NOTE — PROGRESS NOTES
Name: Erik Farooq      : 1980      MRN: [id number]  Encounter Provider: Tracey Perez MD  Encounter Date: 12/3/2024   Encounter department: [facility]    Assessment & Plan  Encounter for wellness examination in adult         Myofascial pain syndrome  Symptoms well-controlled on duloxetine 60 mg daily  Patient uses anti-inflammatories and muscle relaxants very infrequently  Orders:    DULoxetine (CYMBALTA) 60 mg delayed release capsule; Take 1 capsule (60 mg total) by mouth daily    Mixed hyperlipidemia  Lipid panel in 2022 revealed total cholesterol of 202 and LDL of 144  Patient's cholesterol has significantly improved from  (total 241 with LDL of 182)  Continue low-fat low-cholesterol diet  Patient remains on red yeast rice 10 fatty 3 omega supplements  Repeat blood work in summer 2025  Orders:    Comprehensive metabolic panel; Future    Lipid Panel with Direct LDL reflex; Future    CBC; Future    TSH, 3rd generation; Future    Encounter for screening for diabetes mellitus    Orders:    Hemoglobin A1C; Future       Patient Instructions   Your cholesterol and blood sugars have significantly improved  Please repeat fasting 12-hour blood work sometime in May//July    History of Present Illness     Annual well exam  Patient feels well.  Offers no complaints.   Daily Rx include duloxetine 60 mg daily, fatty 3 omega acids and red yeast rice  He uses anti-inflammatory and muscle relaxant very infrequently  Wellness blood work in 2024, results reviewed.  Cholesterol has improved    Follows healthy diet, regular physical activity.    Father of 3 children          Review of Systems   Constitutional: Negative.    HENT: Negative.     Eyes: Negative.    Respiratory: Negative.     Cardiovascular: Negative.    Gastrointestinal: Negative.    Endocrine: Negative.    Genitourinary: Negative.    Musculoskeletal: Negative.    Allergic/Immunologic: Negative.    Neurological:  "Negative.    Hematological: Negative.    Psychiatric/Behavioral: Negative.       Past Medical History:   Diagnosis Date    Anxiety     Arthritis      Past Surgical History:   Procedure Laterality Date    KNEE ARTHROSCOPY W/ ACL RECONSTRUCTION Right     KNEE SURGERY       Family History   Problem Relation Age of Onset    Hyperlipidemia Mother     Melanoma Mother     Hyperlipidemia Father     Coronary artery disease Maternal Grandfather      Social History     Tobacco Use    Smoking status: Never    Smokeless tobacco: Never   Vaping Use    Vaping status: Never Used   Substance and Sexual Activity    Alcohol use: No     Comment: Occasional use per [employer]     Drug use: No    Sexual activity: Yes     Partners: Female     Current Outpatient Medications on File Prior to Visit   Medication Sig    cetirizine (ZyrTEC) 10 mg tablet Take 1 tablet by mouth as needed      cyclobenzaprine (FLEXERIL) 10 mg tablet Take 1 tablet (10 mg total) by mouth daily at bedtime as needed for muscle spasms    diclofenac (VOLTAREN) 75 mg EC tablet TAKE 1 TABLET TWICE A DAY AS NEEDED FOR BACK PAIN. TAKE WITH FOOD    ibuprofen (MOTRIN) 600 mg tablet     Omega-3 1400 MG CAPS Take 2 capsules by mouth in the morning    Red Yeast Rice Extract (RED YEAST RICE PO) Take by mouth daily 2 caps daily     Allergies   Allergen Reactions    Latex Other (See Comments)     redness     Immunization History   Administered Date(s) Administered    COVID-19 PFIZER VACCINE 0.3 ML IM 05/15/2021, 06/05/2021    Tdap 10/25/2024     Objective   /80 (BP Location: Left arm, Patient Position: Sitting, Cuff Size: Large)   Pulse 59   Temp 97.6 °F (36.4 °C) (Temporal)   Resp 18   Ht 6' 1\" (1.854 m)   Wt 95.3 kg (210 lb)   SpO2 98%   BMI 27.71 kg/m²     Physical Exam  Vitals and nursing note reviewed.   Constitutional:       General: He is not in acute distress.     Appearance: Normal appearance. He is well-developed. He is not ill-appearing.   HENT:      Head: " Normocephalic and atraumatic.   Eyes:      General: No scleral icterus.     Conjunctiva/sclera: Conjunctivae normal.   Neck:      Vascular: No carotid bruit.   Cardiovascular:      Rate and Rhythm: Normal rate and regular rhythm.      Heart sounds: Normal heart sounds. No murmur heard.  Pulmonary:      Effort: Pulmonary effort is normal. No respiratory distress.      Breath sounds: Normal breath sounds. No wheezing or rales.   Abdominal:      General: Bowel sounds are normal. There is no distension or abdominal bruit.      Palpations: Abdomen is soft.      Tenderness: There is no abdominal tenderness.      Hernia: No hernia is present.   Musculoskeletal:         General: Normal range of motion.      Cervical back: Neck supple. No rigidity.      Right lower leg: No edema.      Left lower leg: No edema.   Skin:     General: Skin is warm.   Neurological:      General: No focal deficit present.      Mental Status: He is alert and oriented to person, place, and time.      Cranial Nerves: No cranial nerve deficit.   Psychiatric:         Mood and Affect: Mood normal.         Behavior: Behavior normal.         Thought Content: Thought content normal.

## 2024-12-03 NOTE — PATIENT INSTRUCTIONS
Your cholesterol and blood sugars have significantly improved  Please repeat fasting 12-hour blood work sometime in May/June/July

## 2025-01-29 ENCOUNTER — OFFICE VISIT (OUTPATIENT)
Dept: FAMILY MEDICINE CLINIC | Facility: CLINIC | Age: 45
End: 2025-01-29
Payer: COMMERCIAL

## 2025-01-29 VITALS
TEMPERATURE: 97.6 F | DIASTOLIC BLOOD PRESSURE: 60 MMHG | HEIGHT: 73 IN | BODY MASS INDEX: 27.83 KG/M2 | SYSTOLIC BLOOD PRESSURE: 122 MMHG | OXYGEN SATURATION: 97 % | HEART RATE: 69 BPM | WEIGHT: 210 LBS | RESPIRATION RATE: 16 BRPM

## 2025-01-29 DIAGNOSIS — M79.621 PAIN IN RIGHT UPPER ARM: Primary | ICD-10-CM

## 2025-01-29 PROCEDURE — 99213 OFFICE O/P EST LOW 20 MIN: CPT | Performed by: NURSE PRACTITIONER

## 2025-01-29 RX ORDER — METHYLPREDNISOLONE 4 MG/1
TABLET ORAL
Qty: 21 EACH | Refills: 0 | Status: SHIPPED | OUTPATIENT
Start: 2025-01-29 | End: 2025-02-03 | Stop reason: SDUPTHER

## 2025-01-29 NOTE — PROGRESS NOTES
"Name: Erik Farooq      : 1980      MRN: [id number]  Encounter Provider: FAB Lubin  Encounter Date: 2025   Encounter department: [facility]  :  Assessment & Plan  Pain in right upper arm  Suspect cervical radiculopathy.   Trial medrol dose pack.   Will be going to the chiropractor tomorrow.   He will call for any worsening or persisting pain.     Orders:  •  methylPREDNISolone 4 MG tablet therapy pack; Use as directed on package           History of Present Illness   Erik Farooq is a 44 year old male presenting today for right trapezius muscle and upper arm pain.     No neck pain.    Has chronic neck tightness trapezius tightness.   MRI cervical spine in  showed no abnormalities.     Current pain started 2 weeks ago.   Seemed to be gettting better, then got worse again.   Taking ibuprofen.     Hurts when trying to sleep.   No injuries.   Weakness--not sure has been avoiding lifting with this arm.     Right handed.     Can lift arm and reach behind his back.     Numbness or tingling--none.     Lifting arm--pain radiates down arm.    Going to chiropractor tomorrow.               Review of Systems   Constitutional: Negative.    Musculoskeletal:  Positive for arthralgias and neck pain.       Objective   /60 (BP Location: Left arm, Patient Position: Sitting, Cuff Size: Standard)   Pulse 69   Temp 97.6 °F (36.4 °C) (Temporal)   Resp 16   Ht 6' 1\" (1.854 m)   Wt 95.3 kg (210 lb)   SpO2 97%   BMI 27.71 kg/m²      Physical Exam  Vitals and nursing note reviewed.   Constitutional:       General: He is not in acute distress.     Appearance: Normal appearance. He is not ill-appearing.   Musculoskeletal:      Cervical back: Normal range of motion and neck supple.      Comments: Full ROM of Right upper extremity and neck.   Right trapezius tenderness.   Upper extremity strength intact. .   Upper extremity sensation intact.      Neurological:      Mental Status: " He is alert.   Psychiatric:         Mood and Affect: Mood normal.         Current Outpatient Medications:   •  cetirizine (ZyrTEC) 10 mg tablet, Take 1 tablet by mouth as needed  , Disp: , Rfl:   •  cyclobenzaprine (FLEXERIL) 10 mg tablet, Take 1 tablet (10 mg total) by mouth daily at bedtime as needed for muscle spasms, Disp: 90 tablet, Rfl: 1  •  diclofenac (VOLTAREN) 75 mg EC tablet, TAKE 1 TABLET TWICE A DAY AS NEEDED FOR BACK PAIN. TAKE WITH FOOD, Disp: 60 tablet, Rfl: 2  •  DULoxetine (CYMBALTA) 60 mg delayed release capsule, Take 1 capsule (60 mg total) by mouth daily, Disp: 100 capsule, Rfl: 3  •  ibuprofen (MOTRIN) 600 mg tablet, , Disp: , Rfl:   •  methylPREDNISolone 4 MG tablet therapy pack, Use as directed on package, Disp: 21 each, Rfl: 0  •  Omega-3 1400 MG CAPS, Take 2 capsules by mouth in the morning, Disp: , Rfl:   •  Red Yeast Rice Extract (RED YEAST RICE PO), Take by mouth daily 2 caps daily, Disp: , Rfl:

## 2025-02-03 ENCOUNTER — OFFICE VISIT (OUTPATIENT)
Dept: FAMILY MEDICINE CLINIC | Facility: CLINIC | Age: 45
End: 2025-02-03
Payer: COMMERCIAL

## 2025-02-03 VITALS
RESPIRATION RATE: 18 BRPM | DIASTOLIC BLOOD PRESSURE: 80 MMHG | TEMPERATURE: 97.3 F | HEIGHT: 73 IN | OXYGEN SATURATION: 97 % | SYSTOLIC BLOOD PRESSURE: 120 MMHG | BODY MASS INDEX: 27.73 KG/M2 | HEART RATE: 72 BPM | WEIGHT: 209.19 LBS

## 2025-02-03 DIAGNOSIS — M79.621 PAIN IN RIGHT UPPER ARM: ICD-10-CM

## 2025-02-03 DIAGNOSIS — M79.601 RIGHT ARM PAIN: Primary | ICD-10-CM

## 2025-02-03 DIAGNOSIS — M54.12 CERVICAL RADICULOPATHY: ICD-10-CM

## 2025-02-03 PROCEDURE — 99213 OFFICE O/P EST LOW 20 MIN: CPT | Performed by: FAMILY MEDICINE

## 2025-02-03 RX ORDER — METHYLPREDNISOLONE 4 MG/1
TABLET ORAL
Qty: 21 EACH | Refills: 0 | Status: SHIPPED | OUTPATIENT
Start: 2025-02-03

## 2025-02-03 NOTE — PROGRESS NOTES
"Name: Erki Farooq      : 1980      MRN: [id number]  Encounter Provider: Beata Lee DO  Encounter Date: 2/3/2025   Encounter department: [facility]  :  Assessment & Plan  Right arm pain  Likely due to cervical radiculopathy given distribution and character of his pain. Recommend XR c-spine and PT. Steroid taper will be repeated. Follow up for MRI and/or pain management referral if not improved with PT.  Orders:  •  XR spine cervical complete 4 or 5 vw non injury; Future  •  Ambulatory Referral to Physical Therapy; Future    Cervical radiculopathy    Orders:  •  XR spine cervical complete 4 or 5 vw non injury; Future  •  Ambulatory Referral to Physical Therapy; Future    Pain in right upper arm  Repeat steroid taper given initial improvement with steroids and exacerbation after chiropractic adjustment.   Orders:  •  methylPREDNISolone 4 MG tablet therapy pack; Use as directed on package           History of Present Illness   HPI    Here today for R upper arm pain. Was last seen 5 days ago for the same, suspected to be cervical radiculopathy and was prescribed steroid taper. After first day of steroid was feeling great, then went to chiropractor the following day and after neck was cracked pain recurred. Entire right shoulder blade, shoulder, and arm are painful. Over the weekend was applying heat and ice, continued steroid taper, nothing seems to be helping. Not having true muscle spasms anywhere. Pain feels like a deep soreness between the muscle and bone. Also has a dull ache in the shoulder. Was previously taking Naproxen without relief.       Review of Systems    Objective   /80 (Patient Position: Sitting, Cuff Size: Large)   Pulse 72   Temp (!) 97.3 °F (36.3 °C) (Temporal)   Resp 18   Ht 6' 1\" (1.854 m)   Wt 94.9 kg (209 lb 3 oz)   SpO2 97%   BMI 27.60 kg/m²      Physical Exam  Vitals reviewed.   Constitutional:       Appearance: Normal appearance. "   Cardiovascular:      Rate and Rhythm: Normal rate.   Pulmonary:      Effort: Pulmonary effort is normal.   Musculoskeletal:         General: No swelling or deformity. Normal range of motion.      Right shoulder: Normal. No tenderness. Normal range of motion.      Left shoulder: No tenderness. Normal range of motion.      Right upper arm: Normal.      Left upper arm: Normal.      Right elbow: Normal.      Left elbow: Normal.      Right forearm: Normal.      Left forearm: Normal.   Skin:     General: Skin is warm and dry.   Neurological:      Mental Status: He is alert.   Psychiatric:         Mood and Affect: Mood normal.         Behavior: Behavior normal.

## 2025-08-18 ENCOUNTER — APPOINTMENT (OUTPATIENT)
Dept: LAB | Facility: CLINIC | Age: 45
End: 2025-08-18
Attending: PREVENTIVE MEDICINE

## 2025-08-18 DIAGNOSIS — Z00.00 ROUTINE GENERAL MEDICAL EXAMINATION AT A HEALTH CARE FACILITY: ICD-10-CM

## 2025-08-18 LAB
CHOLEST SERPL-MCNC: 223 MG/DL (ref ?–200)
HDLC SERPL-MCNC: 38 MG/DL
LDLC SERPL CALC-MCNC: 159 MG/DL (ref 0–100)
NONHDLC SERPL-MCNC: 185 MG/DL
TRIGL SERPL-MCNC: 129 MG/DL (ref ?–150)

## 2025-08-18 PROCEDURE — 80061 LIPID PANEL: CPT

## 2025-08-18 PROCEDURE — 36415 COLL VENOUS BLD VENIPUNCTURE: CPT

## 2025-08-18 PROCEDURE — 83036 HEMOGLOBIN GLYCOSYLATED A1C: CPT

## 2025-08-19 LAB
EST. AVERAGE GLUCOSE BLD GHB EST-MCNC: 123 MG/DL
HBA1C MFR BLD: 5.9 %